# Patient Record
Sex: FEMALE | Race: OTHER | NOT HISPANIC OR LATINO
[De-identification: names, ages, dates, MRNs, and addresses within clinical notes are randomized per-mention and may not be internally consistent; named-entity substitution may affect disease eponyms.]

---

## 2018-10-16 ENCOUNTER — TRANSCRIPTION ENCOUNTER (OUTPATIENT)
Age: 33
End: 2018-10-16

## 2018-11-26 ENCOUNTER — EMERGENCY (EMERGENCY)
Facility: HOSPITAL | Age: 33
LOS: 0 days | Discharge: HOME | End: 2018-11-26

## 2018-11-26 VITALS
TEMPERATURE: 97 F | RESPIRATION RATE: 18 BRPM | HEART RATE: 98 BPM | DIASTOLIC BLOOD PRESSURE: 85 MMHG | OXYGEN SATURATION: 99 % | SYSTOLIC BLOOD PRESSURE: 160 MMHG

## 2018-11-26 DIAGNOSIS — G43.909 MIGRAINE, UNSPECIFIED, NOT INTRACTABLE, WITHOUT STATUS MIGRAINOSUS: ICD-10-CM

## 2018-11-26 DIAGNOSIS — R11.0 NAUSEA: ICD-10-CM

## 2020-08-05 ENCOUNTER — APPOINTMENT (OUTPATIENT)
Dept: BARIATRICS | Facility: CLINIC | Age: 35
End: 2020-08-05
Payer: MEDICARE

## 2020-08-05 VITALS
DIASTOLIC BLOOD PRESSURE: 84 MMHG | BODY MASS INDEX: 45.99 KG/M2 | TEMPERATURE: 97.2 F | HEART RATE: 81 BPM | OXYGEN SATURATION: 100 % | SYSTOLIC BLOOD PRESSURE: 132 MMHG | HEIGHT: 67 IN | WEIGHT: 293 LBS

## 2020-08-05 DIAGNOSIS — M54.5 LOW BACK PAIN: ICD-10-CM

## 2020-08-05 PROCEDURE — 99203 OFFICE O/P NEW LOW 30 MIN: CPT

## 2020-08-05 NOTE — HISTORY OF PRESENT ILLNESS
[de-identified] : 36 y/o F with super morbid obesity, HTN, insulin dependent type II diabetes with A1C running approximately 7. She is on permanent disability following surgeries of right lower extremity, but she is able to ambulate and ride stationary bike for 30 minutes. She works part time as  and has a bachelor's degree. She is generally upbeat, and states that she has the resources to purchase vitamins, which I explained she will require for the rest of her life. She is s/p laparoscopic cholecystectomy as well as foot surgery. She is interested in gastric bypass or MDS, and I explained the difference. With her super morbid obesity, I think this is a reasonable choice if she conforms to postoperative vitamins and diet. My one concern is her chronic anemia, but she does not know her levels. I ordered preoperative labs.

## 2020-08-05 NOTE — END OF VISIT
[FreeTextEntry3] : All medical record entries made by the Scribe were at my, Dr. Jay Woods, direction and personally dictated by me on 08/05/2020. I have reviewed the chart and agree that the record accurately reflects my personal performance of the history, physical exam, assessment and plan. I have also personally directed, reviewed, and agreed with the chart.

## 2020-08-05 NOTE — PHYSICAL EXAM
[Obese] : obese [Normal] : affect appropriate [de-identified] : central obesity, affect appropriate

## 2020-08-05 NOTE — ASSESSMENT
[FreeTextEntry1] : super morbidly obese\par probable MDS\par suggest hematology consult, because of anemia\par needs UGI series and lab work\par

## 2020-08-13 VITALS — BODY MASS INDEX: 45.99 KG/M2 | WEIGHT: 293 LBS | HEIGHT: 67 IN

## 2020-08-13 DIAGNOSIS — M79.89 OTHER SPECIFIED SOFT TISSUE DISORDERS: ICD-10-CM

## 2020-08-13 DIAGNOSIS — M25.473 EFFUSION, UNSPECIFIED ANKLE: ICD-10-CM

## 2020-09-03 ENCOUNTER — APPOINTMENT (OUTPATIENT)
Dept: BARIATRICS | Facility: CLINIC | Age: 35
End: 2020-09-03
Payer: MEDICARE

## 2020-09-03 VITALS — BODY MASS INDEX: 45.99 KG/M2 | WEIGHT: 293 LBS | HEIGHT: 67 IN

## 2020-09-03 PROCEDURE — 97802 MEDICAL NUTRITION INDIV IN: CPT

## 2020-09-25 ENCOUNTER — APPOINTMENT (OUTPATIENT)
Dept: RADIOLOGY | Facility: HOSPITAL | Age: 35
End: 2020-09-25
Payer: MEDICARE

## 2020-09-25 ENCOUNTER — RESULT REVIEW (OUTPATIENT)
Age: 35
End: 2020-09-25

## 2020-09-25 ENCOUNTER — OUTPATIENT (OUTPATIENT)
Dept: OUTPATIENT SERVICES | Facility: HOSPITAL | Age: 35
LOS: 1 days | End: 2020-09-25
Payer: MEDICARE

## 2020-09-25 PROCEDURE — 74240 X-RAY XM UPR GI TRC 1CNTRST: CPT | Mod: 26

## 2020-09-25 PROCEDURE — 74240 X-RAY XM UPR GI TRC 1CNTRST: CPT

## 2020-09-26 LAB
25(OH)D3 SERPL-MCNC: 24.3 NG/ML
ALBUMIN SERPL ELPH-MCNC: 4.2 G/DL
ALP BLD-CCNC: 90 U/L
ALT SERPL-CCNC: 22 U/L
ANION GAP SERPL CALC-SCNC: 16 MMOL/L
AST SERPL-CCNC: 15 U/L
BASOPHILS # BLD AUTO: 0.03 K/UL
BASOPHILS NFR BLD AUTO: 0.3 %
BILIRUB SERPL-MCNC: 0.2 MG/DL
BUN SERPL-MCNC: 18 MG/DL
CALCIUM SERPL-MCNC: 9.4 MG/DL
CHLORIDE SERPL-SCNC: 102 MMOL/L
CO2 SERPL-SCNC: 23 MMOL/L
CREAT SERPL-MCNC: 0.92 MG/DL
CRP SERPL HS-MCNC: 6.35 MG/L
EOSINOPHIL # BLD AUTO: 0.13 K/UL
EOSINOPHIL NFR BLD AUTO: 1.3 %
ESTIMATED AVERAGE GLUCOSE: 192 MG/DL
FOLATE SERPL-MCNC: 13.9 NG/ML
GLUCOSE BS SERPL-MCNC: 152 MG/DL
GLUCOSE SERPL-MCNC: 154 MG/DL
HBA1C MFR BLD HPLC: 8.3 %
HCT VFR BLD CALC: 33.8 %
HGB BLD-MCNC: 10.1 G/DL
IMM GRANULOCYTES NFR BLD AUTO: 0.2 %
INSULIN SERPL-MCNC: 5.3 UU/ML
IRON SERPL-MCNC: 32 UG/DL
LYMPHOCYTES # BLD AUTO: 2.34 K/UL
LYMPHOCYTES NFR BLD AUTO: 23.6 %
MAN DIFF?: NORMAL
MCHC RBC-ENTMCNC: 28.5 PG
MCHC RBC-ENTMCNC: 29.9 GM/DL
MCV RBC AUTO: 95.5 FL
MONOCYTES # BLD AUTO: 0.54 K/UL
MONOCYTES NFR BLD AUTO: 5.5 %
NEUTROPHILS # BLD AUTO: 6.84 K/UL
NEUTROPHILS NFR BLD AUTO: 69.1 %
PLATELET # BLD AUTO: 243 K/UL
POTASSIUM SERPL-SCNC: 4.6 MMOL/L
PROT SERPL-MCNC: 6.8 G/DL
RBC # BLD: 3.54 M/UL
RBC # FLD: 14.9 %
SODIUM SERPL-SCNC: 141 MMOL/L
TSH SERPL-ACNC: 1.31 UIU/ML
VIT B12 SERPL-MCNC: 635 PG/ML
WBC # FLD AUTO: 9.9 K/UL

## 2020-09-30 LAB
VIT A SERPL-MCNC: 42.3 UG/DL
VIT B1 SERPL-MCNC: 128.4 NMOL/L

## 2020-10-02 ENCOUNTER — APPOINTMENT (OUTPATIENT)
Dept: BARIATRICS | Facility: CLINIC | Age: 35
End: 2020-10-02
Payer: MEDICARE

## 2020-10-02 VITALS — HEIGHT: 67 IN

## 2020-10-02 DIAGNOSIS — J45.909 UNSPECIFIED ASTHMA, UNCOMPLICATED: ICD-10-CM

## 2020-10-02 DIAGNOSIS — M19.90 UNSPECIFIED OSTEOARTHRITIS, UNSPECIFIED SITE: ICD-10-CM

## 2020-10-02 PROCEDURE — 97803 MED NUTRITION INDIV SUBSEQ: CPT

## 2020-10-22 ENCOUNTER — APPOINTMENT (OUTPATIENT)
Dept: BARIATRICS | Facility: CLINIC | Age: 35
End: 2020-10-22

## 2020-10-26 ENCOUNTER — APPOINTMENT (OUTPATIENT)
Dept: HEMATOLOGY ONCOLOGY | Facility: CLINIC | Age: 35
End: 2020-10-26
Payer: MEDICARE

## 2020-10-26 VITALS
BODY MASS INDEX: 45.99 KG/M2 | WEIGHT: 293 LBS | HEIGHT: 67 IN | DIASTOLIC BLOOD PRESSURE: 95 MMHG | SYSTOLIC BLOOD PRESSURE: 163 MMHG | OXYGEN SATURATION: 99 % | HEART RATE: 85 BPM | TEMPERATURE: 97.3 F

## 2020-10-26 VITALS — SYSTOLIC BLOOD PRESSURE: 164 MMHG | DIASTOLIC BLOOD PRESSURE: 98 MMHG

## 2020-10-26 PROCEDURE — 99204 OFFICE O/P NEW MOD 45 MIN: CPT | Mod: 25

## 2020-10-26 PROCEDURE — 36415 COLL VENOUS BLD VENIPUNCTURE: CPT

## 2020-10-26 RX ORDER — ALBUTEROL 90 MCG
90 AEROSOL (GRAM) INHALATION
Refills: 0 | Status: ACTIVE | COMMUNITY

## 2020-10-26 RX ORDER — GLUC/MSM/COLGN2/HYAL/ANTIARTH3 375-375-20
TABLET ORAL
Refills: 0 | Status: ACTIVE | COMMUNITY

## 2020-10-26 RX ORDER — IBUPROFEN 800 MG
TABLET ORAL
Refills: 0 | Status: ACTIVE | COMMUNITY

## 2020-10-26 RX ORDER — INSULIN GLARGINE 100 [IU]/ML
INJECTION, SOLUTION SUBCUTANEOUS
Refills: 0 | Status: ACTIVE | COMMUNITY

## 2020-10-26 RX ORDER — INSULIN LISPRO 100 [IU]/ML
INJECTION, SOLUTION INTRAVENOUS; SUBCUTANEOUS
Refills: 0 | Status: ACTIVE | COMMUNITY

## 2020-10-27 LAB
BASOPHILS # BLD AUTO: 0.04 K/UL
BASOPHILS NFR BLD AUTO: 0.3 %
EOSINOPHIL # BLD AUTO: 0.02 K/UL
EOSINOPHIL NFR BLD AUTO: 0.2 %
ERYTHROCYTE [SEDIMENTATION RATE] IN BLOOD BY WESTERGREN METHOD: 46 MM/HR
FERRITIN SERPL-MCNC: 19 NG/ML
HAPTOGLOB SERPL-MCNC: 141 MG/DL
HCT VFR BLD CALC: 34.7 %
HGB BLD-MCNC: 10.8 G/DL
IMM GRANULOCYTES NFR BLD AUTO: 0.4 %
IRON SATN MFR SERPL: 12 %
IRON SERPL-MCNC: 30 UG/DL
LDH SERPL-CCNC: 287 U/L
LYMPHOCYTES # BLD AUTO: 2.64 K/UL
LYMPHOCYTES NFR BLD AUTO: 21.9 %
MAN DIFF?: NORMAL
MCHC RBC-ENTMCNC: 29.6 PG
MCHC RBC-ENTMCNC: 31.1 GM/DL
MCV RBC AUTO: 95.1 FL
MONOCYTES # BLD AUTO: 0.68 K/UL
MONOCYTES NFR BLD AUTO: 5.6 %
NEUTROPHILS # BLD AUTO: 8.62 K/UL
NEUTROPHILS NFR BLD AUTO: 71.6 %
PLATELET # BLD AUTO: 284 K/UL
RBC # BLD: 3.65 M/UL
RBC # FLD: 15.1 %
TIBC SERPL-MCNC: 254 UG/DL
UIBC SERPL-MCNC: 224 UG/DL
WBC # FLD AUTO: 12.05 K/UL

## 2020-10-30 ENCOUNTER — APPOINTMENT (OUTPATIENT)
Age: 35
End: 2020-10-30

## 2020-10-30 ENCOUNTER — OUTPATIENT (OUTPATIENT)
Dept: OUTPATIENT SERVICES | Facility: HOSPITAL | Age: 35
LOS: 1 days | End: 2020-10-30
Payer: MEDICARE

## 2020-10-30 VITALS
OXYGEN SATURATION: 98 % | SYSTOLIC BLOOD PRESSURE: 146 MMHG | DIASTOLIC BLOOD PRESSURE: 84 MMHG | HEART RATE: 84 BPM | TEMPERATURE: 98 F | RESPIRATION RATE: 17 BRPM

## 2020-10-30 VITALS
WEIGHT: 293 LBS | SYSTOLIC BLOOD PRESSURE: 144 MMHG | HEART RATE: 88 BPM | DIASTOLIC BLOOD PRESSURE: 86 MMHG | TEMPERATURE: 97 F | OXYGEN SATURATION: 99 % | RESPIRATION RATE: 18 BRPM | HEIGHT: 68 IN

## 2020-10-30 DIAGNOSIS — D50.9 IRON DEFICIENCY ANEMIA, UNSPECIFIED: ICD-10-CM

## 2020-10-30 PROCEDURE — 96365 THER/PROPH/DIAG IV INF INIT: CPT

## 2020-10-30 RX ORDER — FERUMOXYTOL 510 MG/17ML
510 INJECTION INTRAVENOUS ONCE
Refills: 0 | Status: COMPLETED | OUTPATIENT
Start: 2020-10-30 | End: 2020-10-30

## 2020-10-30 RX ADMIN — FERUMOXYTOL 117 MILLIGRAM(S): 510 INJECTION INTRAVENOUS at 11:55

## 2020-10-30 RX ADMIN — FERUMOXYTOL 510 MILLIGRAM(S): 510 INJECTION INTRAVENOUS at 12:55

## 2020-11-04 ENCOUNTER — OUTPATIENT (OUTPATIENT)
Dept: OUTPATIENT SERVICES | Facility: HOSPITAL | Age: 35
LOS: 1 days | End: 2020-11-04
Payer: MEDICARE

## 2020-11-04 ENCOUNTER — APPOINTMENT (OUTPATIENT)
Age: 35
End: 2020-11-04

## 2020-11-04 VITALS
OXYGEN SATURATION: 99 % | SYSTOLIC BLOOD PRESSURE: 141 MMHG | RESPIRATION RATE: 18 BRPM | HEART RATE: 76 BPM | DIASTOLIC BLOOD PRESSURE: 88 MMHG | TEMPERATURE: 99 F

## 2020-11-04 VITALS
OXYGEN SATURATION: 99 % | RESPIRATION RATE: 18 BRPM | TEMPERATURE: 99 F | SYSTOLIC BLOOD PRESSURE: 149 MMHG | DIASTOLIC BLOOD PRESSURE: 83 MMHG | HEART RATE: 81 BPM | HEIGHT: 68 IN | WEIGHT: 293 LBS

## 2020-11-04 DIAGNOSIS — D50.9 IRON DEFICIENCY ANEMIA, UNSPECIFIED: ICD-10-CM

## 2020-11-04 PROCEDURE — 96365 THER/PROPH/DIAG IV INF INIT: CPT

## 2020-11-04 RX ORDER — FERUMOXYTOL 510 MG/17ML
510 INJECTION INTRAVENOUS ONCE
Refills: 0 | Status: COMPLETED | OUTPATIENT
Start: 2020-11-04 | End: 2020-11-04

## 2020-11-04 RX ADMIN — FERUMOXYTOL 510 MILLIGRAM(S): 510 INJECTION INTRAVENOUS at 12:29

## 2020-11-04 RX ADMIN — FERUMOXYTOL 117 MILLIGRAM(S): 510 INJECTION INTRAVENOUS at 11:47

## 2020-12-23 ENCOUNTER — APPOINTMENT (OUTPATIENT)
Dept: BARIATRICS | Facility: CLINIC | Age: 35
End: 2020-12-23
Payer: MEDICARE

## 2020-12-23 PROCEDURE — 97803 MED NUTRITION INDIV SUBSEQ: CPT

## 2021-01-12 ENCOUNTER — APPOINTMENT (OUTPATIENT)
Dept: BARIATRICS | Facility: CLINIC | Age: 36
End: 2021-01-12
Payer: MEDICARE

## 2021-01-12 PROCEDURE — 97803 MED NUTRITION INDIV SUBSEQ: CPT

## 2021-01-14 ENCOUNTER — APPOINTMENT (OUTPATIENT)
Dept: HEMATOLOGY ONCOLOGY | Facility: CLINIC | Age: 36
End: 2021-01-14
Payer: MEDICARE

## 2021-01-14 VITALS
OXYGEN SATURATION: 95 % | HEIGHT: 68 IN | TEMPERATURE: 97.2 F | BODY MASS INDEX: 44.41 KG/M2 | DIASTOLIC BLOOD PRESSURE: 92 MMHG | WEIGHT: 293 LBS | HEART RATE: 85 BPM | SYSTOLIC BLOOD PRESSURE: 160 MMHG

## 2021-01-14 PROCEDURE — 36415 COLL VENOUS BLD VENIPUNCTURE: CPT

## 2021-01-14 PROCEDURE — 99214 OFFICE O/P EST MOD 30 MIN: CPT | Mod: 25

## 2021-01-14 NOTE — CONSULT LETTER
[Consult Letter:] : I had the pleasure of evaluating your patient, [unfilled]. [Please see my note below.] : Please see my note below. [Consult Closing:] : Thank you very much for allowing me to participate in the care of this patient.  If you have any questions, please do not hesitate to contact me. [Sincerely,] : Sincerely, [Dear  ___] : Dear  [unfilled], [DrJami  ___] : Dr. ABRAHAM [FreeTextEntry3] : Chaya Alanis MD\par

## 2021-01-14 NOTE — HISTORY OF PRESENT ILLNESS
[de-identified] : 35 years old, going for weight reduction surgery was found to have anemia and vitamin D deficiency...

## 2021-01-15 LAB
25(OH)D3 SERPL-MCNC: 26.8 NG/ML
APTT BLD: 35.2 SEC
BASOPHILS # BLD AUTO: 0.04 K/UL
BASOPHILS NFR BLD AUTO: 0.4 %
EOSINOPHIL # BLD AUTO: 0.16 K/UL
EOSINOPHIL NFR BLD AUTO: 1.5 %
ERYTHROCYTE [SEDIMENTATION RATE] IN BLOOD BY WESTERGREN METHOD: 25 MM/HR
ESTIMATED AVERAGE GLUCOSE: 157 MG/DL
FERRITIN SERPL-MCNC: 80 NG/ML
HAPTOGLOB SERPL-MCNC: 131 MG/DL
HBA1C MFR BLD HPLC: 7.1 %
HCT VFR BLD CALC: 36.1 %
HGB BLD-MCNC: 11.2 G/DL
IMM GRANULOCYTES NFR BLD AUTO: 0.3 %
INR PPP: 0.96 RATIO
IRON SATN MFR SERPL: 19 %
IRON SERPL-MCNC: 38 UG/DL
LDH SERPL-CCNC: 214 U/L
LYMPHOCYTES # BLD AUTO: 3.39 K/UL
LYMPHOCYTES NFR BLD AUTO: 32.5 %
MAN DIFF?: NORMAL
MCHC RBC-ENTMCNC: 30.4 PG
MCHC RBC-ENTMCNC: 31 GM/DL
MCV RBC AUTO: 98.1 FL
MONOCYTES # BLD AUTO: 0.61 K/UL
MONOCYTES NFR BLD AUTO: 5.8 %
NEUTROPHILS # BLD AUTO: 6.2 K/UL
NEUTROPHILS NFR BLD AUTO: 59.5 %
PLATELET # BLD AUTO: 253 K/UL
PT BLD: 11.4 SEC
RBC # BLD: 3.68 M/UL
RBC # FLD: 15.1 %
TIBC SERPL-MCNC: 198 UG/DL
TSH SERPL-ACNC: 1.31 UIU/ML
UIBC SERPL-MCNC: 161 UG/DL
VIT B12 SERPL-MCNC: 622 PG/ML
WBC # FLD AUTO: 10.43 K/UL

## 2021-01-15 NOTE — ASSESSMENT
[FreeTextEntry1] : Repeat blood work patient's hemoglobin improved, her diabetes is better controlled and she is optimized for her surgery.\par \par Patient is hematologically cleared for procedure indicated.

## 2021-01-15 NOTE — CONSULT LETTER
[Dear  ___] : Dear  [unfilled], [Consult Letter:] : I had the pleasure of evaluating your patient, [unfilled]. [Please see my note below.] : Please see my note below. [Consult Closing:] : Thank you very much for allowing me to participate in the care of this patient.  If you have any questions, please do not hesitate to contact me. [Sincerely,] : Sincerely, [DrJami  ___] : Dr. ABRAHAM [FreeTextEntry3] : Chaya Alanis MD\par

## 2021-01-15 NOTE — HISTORY OF PRESENT ILLNESS
[de-identified] : 35 years old, going for weight reduction surgery was found to have anemia and vitamin D deficiency... [de-identified] : January 14, 2021 patient is here for follow-up had IV iron x2

## 2021-01-21 ENCOUNTER — TRANSCRIPTION ENCOUNTER (OUTPATIENT)
Age: 36
End: 2021-01-21

## 2021-01-22 ENCOUNTER — OUTPATIENT (OUTPATIENT)
Dept: OUTPATIENT SERVICES | Facility: HOSPITAL | Age: 36
LOS: 1 days | End: 2021-01-22
Payer: MEDICARE

## 2021-01-22 DIAGNOSIS — Z01.818 ENCOUNTER FOR OTHER PREPROCEDURAL EXAMINATION: ICD-10-CM

## 2021-01-22 LAB
ALBUMIN SERPL ELPH-MCNC: 4.2 G/DL — SIGNIFICANT CHANGE UP (ref 3.3–5)
ALP SERPL-CCNC: 98 U/L — SIGNIFICANT CHANGE UP (ref 40–120)
ALT FLD-CCNC: 17 U/L — SIGNIFICANT CHANGE UP (ref 10–45)
ANION GAP SERPL CALC-SCNC: 9 MMOL/L — SIGNIFICANT CHANGE UP (ref 5–17)
APPEARANCE UR: CLEAR — SIGNIFICANT CHANGE UP
AST SERPL-CCNC: 14 U/L — SIGNIFICANT CHANGE UP (ref 10–40)
BACTERIA # UR AUTO: PRESENT /HPF
BILIRUB SERPL-MCNC: 0.2 MG/DL — SIGNIFICANT CHANGE UP (ref 0.2–1.2)
BILIRUB UR-MCNC: NEGATIVE — SIGNIFICANT CHANGE UP
BUN SERPL-MCNC: 15 MG/DL — SIGNIFICANT CHANGE UP (ref 7–23)
CALCIUM SERPL-MCNC: 9.9 MG/DL — SIGNIFICANT CHANGE UP (ref 8.4–10.5)
CHLORIDE SERPL-SCNC: 98 MMOL/L — SIGNIFICANT CHANGE UP (ref 96–108)
CO2 SERPL-SCNC: 30 MMOL/L — SIGNIFICANT CHANGE UP (ref 22–31)
COLOR SPEC: YELLOW — SIGNIFICANT CHANGE UP
CREAT SERPL-MCNC: 0.85 MG/DL — SIGNIFICANT CHANGE UP (ref 0.5–1.3)
DIFF PNL FLD: NEGATIVE — SIGNIFICANT CHANGE UP
EPI CELLS # UR: SIGNIFICANT CHANGE UP /HPF (ref 0–5)
GLUCOSE SERPL-MCNC: 310 MG/DL — HIGH (ref 70–99)
GLUCOSE UR QL: >=1000
KETONES UR-MCNC: NEGATIVE — SIGNIFICANT CHANGE UP
LEUKOCYTE ESTERASE UR-ACNC: NEGATIVE — SIGNIFICANT CHANGE UP
NITRITE UR-MCNC: NEGATIVE — SIGNIFICANT CHANGE UP
PH UR: 7 — SIGNIFICANT CHANGE UP (ref 5–8)
POTASSIUM SERPL-MCNC: 4.9 MMOL/L — SIGNIFICANT CHANGE UP (ref 3.5–5.3)
POTASSIUM SERPL-SCNC: 4.9 MMOL/L — SIGNIFICANT CHANGE UP (ref 3.5–5.3)
PROT SERPL-MCNC: 7 G/DL — SIGNIFICANT CHANGE UP (ref 6–8.3)
PROT UR-MCNC: ABNORMAL MG/DL
RBC CASTS # UR COMP ASSIST: < 5 /HPF — SIGNIFICANT CHANGE UP
SODIUM SERPL-SCNC: 137 MMOL/L — SIGNIFICANT CHANGE UP (ref 135–145)
SP GR SPEC: 1.01 — SIGNIFICANT CHANGE UP (ref 1–1.03)
UROBILINOGEN FLD QL: 0.2 E.U./DL — SIGNIFICANT CHANGE UP
WBC UR QL: < 5 /HPF — SIGNIFICANT CHANGE UP

## 2021-01-22 PROCEDURE — 81001 URINALYSIS AUTO W/SCOPE: CPT

## 2021-01-22 PROCEDURE — 87086 URINE CULTURE/COLONY COUNT: CPT

## 2021-01-22 PROCEDURE — 93005 ELECTROCARDIOGRAM TRACING: CPT

## 2021-01-22 PROCEDURE — 93010 ELECTROCARDIOGRAM REPORT: CPT

## 2021-01-22 PROCEDURE — 80053 COMPREHEN METABOLIC PANEL: CPT

## 2021-01-23 LAB
CULTURE RESULTS: NO GROWTH — SIGNIFICANT CHANGE UP
SPECIMEN SOURCE: SIGNIFICANT CHANGE UP

## 2021-02-01 ENCOUNTER — OUTPATIENT (OUTPATIENT)
Dept: OUTPATIENT SERVICES | Facility: HOSPITAL | Age: 36
LOS: 1 days | End: 2021-02-01
Payer: MEDICARE

## 2021-02-03 ENCOUNTER — APPOINTMENT (OUTPATIENT)
Dept: BARIATRICS | Facility: CLINIC | Age: 36
End: 2021-02-03
Payer: MEDICARE

## 2021-02-03 VITALS — WEIGHT: 293 LBS | HEIGHT: 68 IN | BODY MASS INDEX: 44.41 KG/M2

## 2021-02-03 PROCEDURE — 99215 OFFICE O/P EST HI 40 MIN: CPT

## 2021-02-03 NOTE — END OF VISIT
[FreeTextEntry3] : All medical record entries made by the Scribe were at my, Dr. Woods's, discretion and personally dictated by me on 02/03/2021. I have reviewed the chart and agree that the record accurately reflects my personal performance of the history, physical exam, assessment and plan. I have also personally directed, reviewed and agreed to the chart.

## 2021-02-03 NOTE — ASSESSMENT
[FreeTextEntry1] : Discussed that patient will be scheduled for next Monday as modified DS but first will run bowel. If has considerable adhesions in this area or gross endometriosis which complicates bowel mobility, would only do sleeve gastrectomy and then reassess, as certainly endometriosis is related to female sex hormones, which is altered by weight loss. Pt understands this. Gave all instructions for surgery. Answered all questions. Between review of her previous history and results, believe that this is complex decision making and thus, she has been scheduled for surgery on Monday.

## 2021-02-03 NOTE — ADDENDUM
[FreeTextEntry1] : This note was written by Bianca Almanzar on 02/03/2021 acting as scribe for Dr. Woods.

## 2021-02-03 NOTE — HISTORY OF PRESENT ILLNESS
[Home] : at home, [unfilled] , at the time of the visit. [Medical Office: ([facility])___] : at the medical office located in  [Verbal consent obtained from patient] : the patient, [unfilled] [de-identified] : Jerri Arredondo returns to see us for combination of morbid obesity and DM. We reviewed all of her blood work and history. Hemoglobin A1C has come down from 8.3 to 7.1. Her fasting insulin is very low, which makes me believe that she has components of Type III or Type IV DM, meaning that her production has gone down and she is insulin resistant to both endogenous and exogenous insulin. She is scheduled for modified DS. Discussed all instructions. Of note, in history is that pt had an appendectomy 3 months ago and was also found to have endometriosis.

## 2021-02-05 ENCOUNTER — LABORATORY RESULT (OUTPATIENT)
Age: 36
End: 2021-02-05

## 2021-02-05 VITALS
HEIGHT: 68 IN | RESPIRATION RATE: 18 BRPM | DIASTOLIC BLOOD PRESSURE: 89 MMHG | TEMPERATURE: 98 F | SYSTOLIC BLOOD PRESSURE: 152 MMHG | HEART RATE: 86 BPM | WEIGHT: 293 LBS | OXYGEN SATURATION: 97 %

## 2021-02-05 RX ORDER — ENOXAPARIN SODIUM 100 MG/ML
35 INJECTION SUBCUTANEOUS
Qty: 0 | Refills: 0 | DISCHARGE

## 2021-02-05 NOTE — PRE-OP CHECKLIST - SELECT TESTS ORDERED
ugi, heme clearance/CBC/CMP/PT/PTT/INR/Urinalysis/EKG/CXR ugi, heme clearance/CBC/CMP/PT/PTT/INR/Type and Screen/Urinalysis/EKG/CXR/POCT Blood Glucose/COVID

## 2021-02-07 ENCOUNTER — TRANSCRIPTION ENCOUNTER (OUTPATIENT)
Age: 36
End: 2021-02-07

## 2021-02-08 ENCOUNTER — RESULT REVIEW (OUTPATIENT)
Age: 36
End: 2021-02-08

## 2021-02-08 ENCOUNTER — INPATIENT (INPATIENT)
Facility: HOSPITAL | Age: 36
LOS: 3 days | Discharge: HOME | DRG: 621 | End: 2021-02-12
Attending: SURGERY | Admitting: SURGERY
Payer: MEDICARE

## 2021-02-08 ENCOUNTER — APPOINTMENT (OUTPATIENT)
Dept: BARIATRICS | Facility: HOSPITAL | Age: 36
End: 2021-02-08

## 2021-02-08 DIAGNOSIS — Z98.890 OTHER SPECIFIED POSTPROCEDURAL STATES: Chronic | ICD-10-CM

## 2021-02-08 DIAGNOSIS — Z90.49 ACQUIRED ABSENCE OF OTHER SPECIFIED PARTS OF DIGESTIVE TRACT: Chronic | ICD-10-CM

## 2021-02-08 LAB
BLD GP AB SCN SERPL QL: NEGATIVE — SIGNIFICANT CHANGE UP
GLUCOSE BLDC GLUCOMTR-MCNC: 194 MG/DL — HIGH (ref 70–99)
GLUCOSE BLDC GLUCOMTR-MCNC: 230 MG/DL — HIGH (ref 70–99)
GLUCOSE BLDC GLUCOMTR-MCNC: 253 MG/DL — HIGH (ref 70–99)
GLUCOSE BLDC GLUCOMTR-MCNC: 94 MG/DL — SIGNIFICANT CHANGE UP (ref 70–99)
HCT VFR BLD CALC: 35.1 % — SIGNIFICANT CHANGE UP (ref 34.5–45)
HGB BLD-MCNC: 11.3 G/DL — LOW (ref 11.5–15.5)
MCHC RBC-ENTMCNC: 29.7 PG — SIGNIFICANT CHANGE UP (ref 27–34)
MCHC RBC-ENTMCNC: 32.2 GM/DL — SIGNIFICANT CHANGE UP (ref 32–36)
MCV RBC AUTO: 92.1 FL — SIGNIFICANT CHANGE UP (ref 80–100)
NRBC # BLD: 0 /100 WBCS — SIGNIFICANT CHANGE UP (ref 0–0)
PLATELET # BLD AUTO: 245 K/UL — SIGNIFICANT CHANGE UP (ref 150–400)
RBC # BLD: 3.81 M/UL — SIGNIFICANT CHANGE UP (ref 3.8–5.2)
RBC # FLD: 14.4 % — SIGNIFICANT CHANGE UP (ref 10.3–14.5)
RH IG SCN BLD-IMP: POSITIVE — SIGNIFICANT CHANGE UP
WBC # BLD: 14.36 K/UL — HIGH (ref 3.8–10.5)
WBC # FLD AUTO: 14.36 K/UL — HIGH (ref 3.8–10.5)

## 2021-02-08 PROCEDURE — 43775 LAP SLEEVE GASTRECTOMY: CPT

## 2021-02-08 PROCEDURE — 88307 TISSUE EXAM BY PATHOLOGIST: CPT | Mod: 26

## 2021-02-08 PROCEDURE — 44238 UNLISTED LAPS PX INTESTINE: CPT

## 2021-02-08 PROCEDURE — 43644 LAP GASTRIC BYPASS/ROUX-EN-Y: CPT

## 2021-02-08 PROCEDURE — 71045 X-RAY EXAM CHEST 1 VIEW: CPT | Mod: 26

## 2021-02-08 RX ORDER — HYDRALAZINE HCL 50 MG
10 TABLET ORAL ONCE
Refills: 0 | Status: COMPLETED | OUTPATIENT
Start: 2021-02-08 | End: 2021-02-08

## 2021-02-08 RX ORDER — ENOXAPARIN SODIUM 100 MG/ML
30 INJECTION SUBCUTANEOUS ONCE
Refills: 0 | Status: COMPLETED | OUTPATIENT
Start: 2021-02-08 | End: 2021-02-08

## 2021-02-08 RX ORDER — DEXTROSE 50 % IN WATER 50 %
25 SYRINGE (ML) INTRAVENOUS ONCE
Refills: 0 | Status: DISCONTINUED | OUTPATIENT
Start: 2021-02-08 | End: 2021-02-12

## 2021-02-08 RX ORDER — PANTOPRAZOLE SODIUM 20 MG/1
40 TABLET, DELAYED RELEASE ORAL DAILY
Refills: 0 | Status: DISCONTINUED | OUTPATIENT
Start: 2021-02-08 | End: 2021-02-08

## 2021-02-08 RX ORDER — KETOROLAC TROMETHAMINE 30 MG/ML
15 SYRINGE (ML) INJECTION EVERY 6 HOURS
Refills: 0 | Status: DISCONTINUED | OUTPATIENT
Start: 2021-02-08 | End: 2021-02-10

## 2021-02-08 RX ORDER — METOCLOPRAMIDE HCL 10 MG
10 TABLET ORAL ONCE
Refills: 0 | Status: COMPLETED | OUTPATIENT
Start: 2021-02-08 | End: 2021-02-08

## 2021-02-08 RX ORDER — SODIUM CHLORIDE 9 MG/ML
1000 INJECTION, SOLUTION INTRAVENOUS
Refills: 0 | Status: DISCONTINUED | OUTPATIENT
Start: 2021-02-08 | End: 2021-02-12

## 2021-02-08 RX ORDER — ENOXAPARIN SODIUM 100 MG/ML
50 INJECTION SUBCUTANEOUS
Qty: 0 | Refills: 0 | DISCHARGE

## 2021-02-08 RX ORDER — PANTOPRAZOLE SODIUM 20 MG/1
40 TABLET, DELAYED RELEASE ORAL DAILY
Refills: 0 | Status: DISCONTINUED | OUTPATIENT
Start: 2021-02-08 | End: 2021-02-12

## 2021-02-08 RX ORDER — DEXTROSE 50 % IN WATER 50 %
12.5 SYRINGE (ML) INTRAVENOUS ONCE
Refills: 0 | Status: DISCONTINUED | OUTPATIENT
Start: 2021-02-08 | End: 2021-02-12

## 2021-02-08 RX ORDER — ALBUTEROL 90 UG/1
2.5 AEROSOL, METERED ORAL ONCE
Refills: 0 | Status: COMPLETED | OUTPATIENT
Start: 2021-02-08 | End: 2021-02-08

## 2021-02-08 RX ORDER — SCOPALAMINE 1 MG/3D
1 PATCH, EXTENDED RELEASE TRANSDERMAL ONCE
Refills: 0 | Status: COMPLETED | OUTPATIENT
Start: 2021-02-08 | End: 2021-02-08

## 2021-02-08 RX ORDER — ONDANSETRON 8 MG/1
4 TABLET, FILM COATED ORAL EVERY 6 HOURS
Refills: 0 | Status: DISCONTINUED | OUTPATIENT
Start: 2021-02-08 | End: 2021-02-12

## 2021-02-08 RX ORDER — DEXTROSE 50 % IN WATER 50 %
15 SYRINGE (ML) INTRAVENOUS ONCE
Refills: 0 | Status: DISCONTINUED | OUTPATIENT
Start: 2021-02-08 | End: 2021-02-12

## 2021-02-08 RX ORDER — FERROUS SULFATE 325(65) MG
1 TABLET ORAL
Qty: 0 | Refills: 0 | DISCHARGE

## 2021-02-08 RX ORDER — ACETAMINOPHEN 500 MG
1000 TABLET ORAL ONCE
Refills: 0 | Status: COMPLETED | OUTPATIENT
Start: 2021-02-08 | End: 2021-02-08

## 2021-02-08 RX ORDER — AMLODIPINE BESYLATE 2.5 MG/1
1 TABLET ORAL
Qty: 0 | Refills: 0 | DISCHARGE

## 2021-02-08 RX ORDER — SODIUM CHLORIDE 9 MG/ML
1000 INJECTION, SOLUTION INTRAVENOUS
Refills: 0 | Status: DISCONTINUED | OUTPATIENT
Start: 2021-02-08 | End: 2021-02-09

## 2021-02-08 RX ORDER — HYDROMORPHONE HYDROCHLORIDE 2 MG/ML
0.5 INJECTION INTRAMUSCULAR; INTRAVENOUS; SUBCUTANEOUS
Refills: 0 | Status: DISCONTINUED | OUTPATIENT
Start: 2021-02-08 | End: 2021-02-08

## 2021-02-08 RX ORDER — ALBUTEROL 90 UG/1
2 AEROSOL, METERED ORAL
Qty: 0 | Refills: 0 | DISCHARGE

## 2021-02-08 RX ORDER — GLUCAGON INJECTION, SOLUTION 0.5 MG/.1ML
1 INJECTION, SOLUTION SUBCUTANEOUS ONCE
Refills: 0 | Status: DISCONTINUED | OUTPATIENT
Start: 2021-02-08 | End: 2021-02-12

## 2021-02-08 RX ORDER — GABAPENTIN 400 MG/1
300 CAPSULE ORAL ONCE
Refills: 0 | Status: COMPLETED | OUTPATIENT
Start: 2021-02-08 | End: 2021-02-08

## 2021-02-08 RX ORDER — LABETALOL HCL 100 MG
10 TABLET ORAL ONCE
Refills: 0 | Status: COMPLETED | OUTPATIENT
Start: 2021-02-08 | End: 2021-02-08

## 2021-02-08 RX ORDER — INSULIN LISPRO 100/ML
VIAL (ML) SUBCUTANEOUS
Refills: 0 | Status: DISCONTINUED | OUTPATIENT
Start: 2021-02-08 | End: 2021-02-12

## 2021-02-08 RX ORDER — HYDROMORPHONE HYDROCHLORIDE 2 MG/ML
0.5 INJECTION INTRAMUSCULAR; INTRAVENOUS; SUBCUTANEOUS ONCE
Refills: 0 | Status: DISCONTINUED | OUTPATIENT
Start: 2021-02-08 | End: 2021-02-08

## 2021-02-08 RX ORDER — ACETAMINOPHEN 500 MG
650 TABLET ORAL EVERY 6 HOURS
Refills: 0 | Status: DISCONTINUED | OUTPATIENT
Start: 2021-02-08 | End: 2021-02-11

## 2021-02-08 RX ORDER — PANTOPRAZOLE SODIUM 20 MG/1
40 TABLET, DELAYED RELEASE ORAL
Refills: 0 | Status: DISCONTINUED | OUTPATIENT
Start: 2021-02-08 | End: 2021-02-08

## 2021-02-08 RX ORDER — INSULIN LISPRO 100/ML
15 VIAL (ML) SUBCUTANEOUS
Qty: 0 | Refills: 0 | DISCHARGE

## 2021-02-08 RX ADMIN — ONDANSETRON 4 MILLIGRAM(S): 8 TABLET, FILM COATED ORAL at 17:15

## 2021-02-08 RX ADMIN — HYDROMORPHONE HYDROCHLORIDE 0.5 MILLIGRAM(S): 2 INJECTION INTRAMUSCULAR; INTRAVENOUS; SUBCUTANEOUS at 12:28

## 2021-02-08 RX ADMIN — SCOPALAMINE 1 PATCH: 1 PATCH, EXTENDED RELEASE TRANSDERMAL at 18:51

## 2021-02-08 RX ADMIN — Medication 15 MILLIGRAM(S): at 23:15

## 2021-02-08 RX ADMIN — SCOPALAMINE 1 PATCH: 1 PATCH, EXTENDED RELEASE TRANSDERMAL at 06:33

## 2021-02-08 RX ADMIN — Medication 400 MILLIGRAM(S): at 20:19

## 2021-02-08 RX ADMIN — Medication 2: at 11:50

## 2021-02-08 RX ADMIN — HYDROMORPHONE HYDROCHLORIDE 0.5 MILLIGRAM(S): 2 INJECTION INTRAMUSCULAR; INTRAVENOUS; SUBCUTANEOUS at 17:21

## 2021-02-08 RX ADMIN — SODIUM CHLORIDE 190 MILLILITER(S): 9 INJECTION, SOLUTION INTRAVENOUS at 17:20

## 2021-02-08 RX ADMIN — Medication 4: at 22:13

## 2021-02-08 RX ADMIN — Medication 10 MILLIGRAM(S): at 11:46

## 2021-02-08 RX ADMIN — PANTOPRAZOLE SODIUM 40 MILLIGRAM(S): 20 TABLET, DELAYED RELEASE ORAL at 12:08

## 2021-02-08 RX ADMIN — Medication 1000 MILLIGRAM(S): at 06:32

## 2021-02-08 RX ADMIN — ALBUTEROL 2.5 MILLIGRAM(S): 90 AEROSOL, METERED ORAL at 07:18

## 2021-02-08 RX ADMIN — Medication 10 MILLIGRAM(S): at 12:38

## 2021-02-08 RX ADMIN — ONDANSETRON 4 MILLIGRAM(S): 8 TABLET, FILM COATED ORAL at 23:18

## 2021-02-08 RX ADMIN — GABAPENTIN 300 MILLIGRAM(S): 400 CAPSULE ORAL at 06:32

## 2021-02-08 RX ADMIN — SODIUM CHLORIDE 190 MILLILITER(S): 9 INJECTION, SOLUTION INTRAVENOUS at 22:13

## 2021-02-08 RX ADMIN — Medication 10 MILLIGRAM(S): at 20:19

## 2021-02-08 RX ADMIN — ENOXAPARIN SODIUM 30 MILLIGRAM(S): 100 INJECTION SUBCUTANEOUS at 06:33

## 2021-02-08 RX ADMIN — Medication 6: at 17:15

## 2021-02-08 NOTE — BRIEF OPERATIVE NOTE - OPERATION/FINDINGS
Stomach divided along Bougie edge using Covidien stapler. Omentum sutured to greater curvature of sleeved stomach. Enterotomies made in duodenal stump & intestine using Harmonic device. Posterior half of anastomosis sutured laparoscopically. NGT passed through pylorus into small bowel then anterior half of anastomosis completed. Methylene blue leak test negative for extravasation. Hemostasis achieved. Fascia at 15mm post site closed. Port sites closed w/ 2-0 Vicryl & 4-0 Monocryl sutures & surgical glue.

## 2021-02-08 NOTE — H&P ADULT - NSICDXPASTSURGICALHX_GEN_ALL_CORE_FT
PAST SURGICAL HISTORY:  H/O foot surgery left    H/O ovarian cystectomy right    History of appendectomy     History of cholecystectomy

## 2021-02-08 NOTE — H&P ADULT - NSICDXPASTMEDICALHX_GEN_ALL_CORE_FT
PAST MEDICAL HISTORY:  Asthma     DM2 (diabetes mellitus, type 2)     Endometriosis     HLD (hyperlipidemia)     HTN (hypertension)     Morbid obesity

## 2021-02-08 NOTE — H&P ADULT - HISTORY OF PRESENT ILLNESS
35F hx MO (BMI 51.1), appendectomy, cholecystectomy, DM, iron deficiency anemia, HTN, presents for mDS.     35F hx MO (BMI 51.1), appendectomy and ovarian cystectomy (Nov 2020), cholecystectomy, DM, iron deficiency anemia, HTN, presents for mDS.

## 2021-02-08 NOTE — H&P ADULT - ASSESSMENT
35F hx MO (BMI 51.1), appendectomy, cholecystectomy, DM, iron deficiency anemia, HTN, presents for mDS.    - proceed to OR 35F hx MO (BMI 51.1), appendectomy and ovarian cystectomy (Nov 2020), cholecystectomy, DM, iron deficiency anemia, HTN, presents for mDS.    - proceed to OR

## 2021-02-08 NOTE — H&P ADULT - NSHPPHYSICALEXAM_GEN_ALL_CORE
Gen: NAD  Pulm: normal resp effort and excursion  Abd: soft, NT/ND  Ext: WWP Gen: NAD  Pulm: normal resp effort and excursion  Abd: soft, NT/ND, previous port sites healing well  Ext: WWP

## 2021-02-09 ENCOUNTER — TRANSCRIPTION ENCOUNTER (OUTPATIENT)
Age: 36
End: 2021-02-09

## 2021-02-09 LAB
A1C WITH ESTIMATED AVERAGE GLUCOSE RESULT: 7 % — HIGH (ref 4–5.6)
ANION GAP SERPL CALC-SCNC: 9 MMOL/L — SIGNIFICANT CHANGE UP (ref 5–17)
BUN SERPL-MCNC: 15 MG/DL — SIGNIFICANT CHANGE UP (ref 7–23)
CALCIUM SERPL-MCNC: 8.9 MG/DL — SIGNIFICANT CHANGE UP (ref 8.4–10.5)
CHLORIDE SERPL-SCNC: 102 MMOL/L — SIGNIFICANT CHANGE UP (ref 96–108)
CO2 SERPL-SCNC: 26 MMOL/L — SIGNIFICANT CHANGE UP (ref 22–31)
CREAT SERPL-MCNC: 0.67 MG/DL — SIGNIFICANT CHANGE UP (ref 0.5–1.3)
ESTIMATED AVERAGE GLUCOSE: 154 MG/DL — HIGH (ref 68–114)
GLUCOSE BLDC GLUCOMTR-MCNC: 198 MG/DL — HIGH (ref 70–99)
GLUCOSE BLDC GLUCOMTR-MCNC: 209 MG/DL — HIGH (ref 70–99)
GLUCOSE BLDC GLUCOMTR-MCNC: 211 MG/DL — HIGH (ref 70–99)
GLUCOSE BLDC GLUCOMTR-MCNC: 239 MG/DL — HIGH (ref 70–99)
GLUCOSE SERPL-MCNC: 207 MG/DL — HIGH (ref 70–99)
HCT VFR BLD CALC: 31 % — LOW (ref 34.5–45)
HGB BLD-MCNC: 10.2 G/DL — LOW (ref 11.5–15.5)
MAGNESIUM SERPL-MCNC: 1.8 MG/DL — SIGNIFICANT CHANGE UP (ref 1.6–2.6)
MCHC RBC-ENTMCNC: 30.6 PG — SIGNIFICANT CHANGE UP (ref 27–34)
MCHC RBC-ENTMCNC: 32.9 GM/DL — SIGNIFICANT CHANGE UP (ref 32–36)
MCV RBC AUTO: 93.1 FL — SIGNIFICANT CHANGE UP (ref 80–100)
NRBC # BLD: 0 /100 WBCS — SIGNIFICANT CHANGE UP (ref 0–0)
PHOSPHATE SERPL-MCNC: 2.6 MG/DL — SIGNIFICANT CHANGE UP (ref 2.5–4.5)
PLATELET # BLD AUTO: 223 K/UL — SIGNIFICANT CHANGE UP (ref 150–400)
POTASSIUM SERPL-MCNC: 3.5 MMOL/L — SIGNIFICANT CHANGE UP (ref 3.5–5.3)
POTASSIUM SERPL-SCNC: 3.5 MMOL/L — SIGNIFICANT CHANGE UP (ref 3.5–5.3)
RBC # BLD: 3.33 M/UL — LOW (ref 3.8–5.2)
RBC # FLD: 14.4 % — SIGNIFICANT CHANGE UP (ref 10.3–14.5)
SODIUM SERPL-SCNC: 137 MMOL/L — SIGNIFICANT CHANGE UP (ref 135–145)
SURGICAL PATHOLOGY STUDY: SIGNIFICANT CHANGE UP
WBC # BLD: 14.08 K/UL — HIGH (ref 3.8–10.5)
WBC # FLD AUTO: 14.08 K/UL — HIGH (ref 3.8–10.5)

## 2021-02-09 RX ORDER — CHLORHEXIDINE GLUCONATE 213 G/1000ML
1 SOLUTION TOPICAL DAILY
Refills: 0 | Status: DISCONTINUED | OUTPATIENT
Start: 2021-02-09 | End: 2021-02-12

## 2021-02-09 RX ORDER — DEXAMETHASONE 0.5 MG/5ML
6 ELIXIR ORAL ONCE
Refills: 0 | Status: COMPLETED | OUTPATIENT
Start: 2021-02-09 | End: 2021-02-09

## 2021-02-09 RX ORDER — MAGNESIUM SULFATE 500 MG/ML
2 VIAL (ML) INJECTION ONCE
Refills: 0 | Status: COMPLETED | OUTPATIENT
Start: 2021-02-09 | End: 2021-02-09

## 2021-02-09 RX ORDER — ACETAMINOPHEN 500 MG
1000 TABLET ORAL ONCE
Refills: 0 | Status: COMPLETED | OUTPATIENT
Start: 2021-02-09 | End: 2021-02-09

## 2021-02-09 RX ORDER — POTASSIUM CHLORIDE 20 MEQ
10 PACKET (EA) ORAL
Refills: 0 | Status: COMPLETED | OUTPATIENT
Start: 2021-02-09 | End: 2021-02-09

## 2021-02-09 RX ORDER — SODIUM CHLORIDE 9 MG/ML
1000 INJECTION, SOLUTION INTRAVENOUS
Refills: 0 | Status: DISCONTINUED | OUTPATIENT
Start: 2021-02-09 | End: 2021-02-11

## 2021-02-09 RX ADMIN — SCOPALAMINE 1 PATCH: 1 PATCH, EXTENDED RELEASE TRANSDERMAL at 06:53

## 2021-02-09 RX ADMIN — Medication 15 MILLIGRAM(S): at 23:07

## 2021-02-09 RX ADMIN — Medication 4: at 07:49

## 2021-02-09 RX ADMIN — Medication 15 MILLIGRAM(S): at 05:23

## 2021-02-09 RX ADMIN — Medication 100 MILLIEQUIVALENT(S): at 11:06

## 2021-02-09 RX ADMIN — Medication 100 MILLIEQUIVALENT(S): at 09:20

## 2021-02-09 RX ADMIN — CHLORHEXIDINE GLUCONATE 1 APPLICATION(S): 213 SOLUTION TOPICAL at 11:19

## 2021-02-09 RX ADMIN — Medication 400 MILLIGRAM(S): at 09:18

## 2021-02-09 RX ADMIN — PANTOPRAZOLE SODIUM 40 MILLIGRAM(S): 20 TABLET, DELAYED RELEASE ORAL at 11:06

## 2021-02-09 RX ADMIN — Medication 50 GRAM(S): at 12:02

## 2021-02-09 RX ADMIN — ONDANSETRON 4 MILLIGRAM(S): 8 TABLET, FILM COATED ORAL at 11:06

## 2021-02-09 RX ADMIN — Medication 15 MILLIGRAM(S): at 11:06

## 2021-02-09 RX ADMIN — Medication 6 MILLIGRAM(S): at 09:57

## 2021-02-09 RX ADMIN — Medication 15 MILLIGRAM(S): at 16:07

## 2021-02-09 RX ADMIN — ONDANSETRON 4 MILLIGRAM(S): 8 TABLET, FILM COATED ORAL at 05:23

## 2021-02-09 RX ADMIN — ONDANSETRON 4 MILLIGRAM(S): 8 TABLET, FILM COATED ORAL at 19:48

## 2021-02-09 RX ADMIN — Medication 400 MILLIGRAM(S): at 21:36

## 2021-02-09 RX ADMIN — Medication 100 MILLIEQUIVALENT(S): at 07:59

## 2021-02-09 RX ADMIN — Medication 2: at 21:36

## 2021-02-09 RX ADMIN — Medication 4: at 17:23

## 2021-02-09 RX ADMIN — SODIUM CHLORIDE 80 MILLILITER(S): 9 INJECTION, SOLUTION INTRAVENOUS at 19:48

## 2021-02-09 RX ADMIN — Medication 4: at 12:02

## 2021-02-09 RX ADMIN — SCOPALAMINE 1 PATCH: 1 PATCH, EXTENDED RELEASE TRANSDERMAL at 18:47

## 2021-02-09 RX ADMIN — Medication 400 MILLIGRAM(S): at 02:01

## 2021-02-09 NOTE — DISCHARGE NOTE PROVIDER - NSDCFUADDAPPT_GEN_ALL_CORE_FT
Please make a follow up appt with your endocrinologist 1 week from discharge to adjust diabetes medication.  Please make a follow up appt with your primary care physician 1 week from discharge.

## 2021-02-09 NOTE — DIETITIAN INITIAL EVALUATION ADULT. - OTHER INFO
35F hx MO (BMI 51.1), appendectomy and ovarian cystectomy (Nov 2020), cholecystectomy, DM, iron deficiency anemia, HTN, admitting for elective lap sleeve gastrectomy (2/8), now POD #1.     On assessment, pt OOB in chair verbalizing "intense" stomach pain- RN made aware. Currently on BARICLLIQ diet, tolerating PO. Pt consumed 1/2 oz water prior to 9AM stating that she was worried she would vomit if she drank more. Reviewed importance of maintaining adequate hydration to prevent complications from dehydration. Pt understanding of importance. Pt endorses nausea- RN made aware ( not ordered for Zofran). Discussed volumes of various cup sizes on tray table and encouraged aiming for 4 oz/hr as tolerated. Prepared with protein shakes and vitamins at home. RD provided in-depth edu on diet advancement and specific nutrient needs s/p DS. Pt allergic to Shellfish- already noted in EMR. No dietary restrictions at home. Pt endorses starting to lose weights prior to surgery (states she lost 18 lbs x 3 months however she is unable to state her UBW prior to wt loss). Reviewed importance of adequate protein intake to maintain lean muscle mass with expected weight loss s/p bariatric surgery. Pt verbalized understanding. Labs: noted elevated POCT- pt w/ hx of DM and ordered for Decadron- see recs below for bs control per team. Skin: surgical incisions. GI: WDL per flowsheet. RD to follow up per protocol. 35F hx MO (BMI 51.1), appendectomy and ovarian cystectomy (Nov 2020), cholecystectomy, DM, iron deficiency anemia, HTN, admitting for elective Duodenal Switch  (2/8), now POD #1.     On assessment, pt OOB in chair verbalizing "intense" stomach pain- RN made aware. Currently on BARICLLIQ diet, tolerating PO. Pt consumed 1/2 oz water prior to 9AM stating that she was worried she would vomit if she drank more. Reviewed importance of maintaining adequate hydration to prevent complications from dehydration. Pt understanding of importance. Pt endorses nausea- RN made aware ( not ordered for Zofran). Discussed volumes of various cup sizes on tray table and encouraged aiming for 4 oz/hr as tolerated. Prepared with protein shakes and vitamins at home. RD provided in-depth edu on diet advancement and specific nutrient needs s/p DS. Pt allergic to Shellfish- already noted in EMR. No dietary restrictions at home. Pt endorses starting to lose weights prior to surgery (states she lost 18 lbs x 3 months however she is unable to state her UBW prior to wt loss). Reviewed importance of adequate protein intake to maintain lean muscle mass with expected weight loss s/p bariatric surgery. Pt verbalized understanding. Labs: noted elevated POCT- pt w/ hx of DM and ordered for Decadron- see recs below for bs control per team. Skin: surgical incisions. GI: WDL per flowsheet. RD to follow up per protocol.

## 2021-02-09 NOTE — DISCHARGE NOTE PROVIDER - NSDCCPCAREPLAN_GEN_ALL_CORE_FT
PRINCIPAL DISCHARGE DIAGNOSIS  Diagnosis: Morbid obesity  Assessment and Plan of Treatment: 35 year old with history of  MO (BMI 51), DM, HTN, iron deficiency anemia, pshx; appendectomy, cholecystectomy and ovarian cystectomy now s/p modified Duodenal Switch. Pt tolerated the procedure well. At time of discharge, pt was tolerating a bariatric clear liquid diet, and pt's pain was controlled. Plan is to follow up with Dr. Woods in the office.  1) Please take Tylenol 650 mg every 4 to 6 hours by mouth for moderate pain control. Please do not exceed over 4,000 mg of Tylenol a day.  2) Please start taking Eliquis 2.5 mg by mouth twice a day starting Thursday February 11, 2021 3 days after surgery .  3) Please take Omeprazole 40 mg once a day by mouth.         PRINCIPAL DISCHARGE DIAGNOSIS  Diagnosis: Morbid obesity  Assessment and Plan of Treatment: 35 year old with history of  MO (BMI 51), DM, HTN, iron deficiency anemia, pshx; appendectomy, cholecystectomy and ovarian cystectomy now s/p modified Duodenal Switch. Pt tolerated the procedure well. At time of discharge, pt was tolerating a bariatric clear liquid diet, and pt's pain was controlled. Plan is to follow up with Dr. Woods in the office.  1) Please take Tylenol 650 mg every 4 to 6 hours by mouth for moderate pain control. Please do not exceed over 4,000 mg of Tylenol a day.  2) Please start taking Eliquis 2.5 mg by mouth twice a day starting Thursday February 12, 2021   3) Please take Omeprazole 40 mg once a day by mouth.

## 2021-02-09 NOTE — DIETITIAN INITIAL EVALUATION ADULT. - OTHER CALCULATIONS
IBW used as pt exceeds 120% IBW (240%). Above energy needs calculated for wt maintenance (20-25kcal/kg).   Weeks 1-2 estimated needs: kcal/day (10-12kcal/kg), g pro/day (1.5-2.1g/kg), >/=64oz clear fluids.

## 2021-02-09 NOTE — DISCHARGE NOTE PROVIDER - CARE PROVIDER_API CALL
Jay Woods (MD)  Surgery  [address] [place] [address], [place] [address]  Phone: [phone]  Fax: [phone]  Follow Up Time: 2 weeks

## 2021-02-09 NOTE — DIETITIAN INITIAL EVALUATION ADULT. - NS FNS REASON FOR WEIGHT CHANG
reported healthy life-style modifications; reported wt loss of 18 lbs x 3 months (pt unable to state UBW)

## 2021-02-09 NOTE — DISCHARGE NOTE PROVIDER - NSDCMRMEDTOKEN_GEN_ALL_CORE_FT
Albuterol (Eqv-Proventil HFA) 90 mcg/inh inhalation aerosol: 2 puff(s) inhaled every 6 hours, As Needed  amLODIPine 10 mg oral tablet: 1 tab(s) orally once a day  ferrous sulfate 325 mg (65 mg elemental iron) oral tablet: 1 tab(s) orally 3 times a day  HumaLOG KwikPen 100 units/mL injectable solution: 15 unit(s) injectable 3 times a day  Lantus Solostar Pen 100 units/mL subcutaneous solution: 50 unit(s) subcutaneous once a day (at bedtime)   Albuterol (Eqv-Proventil HFA) 90 mcg/inh inhalation aerosol: 2 puff(s) inhaled every 6 hours, As Needed  amLODIPine 10 mg oral tablet: 1 tab(s) orally once a day  Eliquis 2.5 mg oral tablet: 1 tab(s) orally every 12 hours   ferrous sulfate 325 mg (65 mg elemental iron) oral tablet: 1 tab(s) orally 3 times a day  HumaLOG KwikPen 100 units/mL injectable solution: 15 unit(s) injectable 3 times a day  Lantus Solostar Pen 100 units/mL subcutaneous solution: 50 unit(s) subcutaneous once a day (at bedtime)  omeprazole 40 mg oral delayed release capsule: 1 cap(s) orally once a day    Albuterol (Eqv-Proventil HFA) 90 mcg/inh inhalation aerosol: 2 puff(s) inhaled every 6 hours, As Needed  amLODIPine 10 mg oral tablet: 1 tab(s) orally once a day  Eliquis 2.5 mg oral tablet: 1 tab(s) orally every 12 hours   ferrous sulfate 325 mg (65 mg elemental iron) oral tablet: 1 tab(s) orally 3 times a day  HumaLOG KwikPen 100 units/mL injectable solution: 15 unit(s) injectable 3 times a day  Lantus Solostar Pen 100 units/mL subcutaneous solution: 50 unit(s) subcutaneous once a day (at bedtime)  omeprazole 40 mg oral delayed release capsule: 1 cap(s) orally once a day   ondansetron 4 mg oral tablet, disintegratin tab(s) orally every 6 hours, As Needed -for nausea

## 2021-02-09 NOTE — DISCHARGE NOTE PROVIDER - HOSPITAL COURSE
35 year old with history of  MO (BMI 51), DM, HTN, iron deficiency anemia, pshx; appendectomy, cholecystectomy and ovarian cystectomy now s/p modified Duodenal Switch.  Pt tolerated the procedure well. At time of discharge, pt was tolerating a bariatric clear liquid diet, and pt's pain was controlled. Plan is to follow up with Dr. Woods in the office.

## 2021-02-09 NOTE — DISCHARGE NOTE PROVIDER - NSDCCPGOAL_GEN_ALL_CORE_FT
To get better and follow your care plan as instructed.  1) Please take Tylenol 650 mg every 4 to 6 hours by mouth for moderate pain control. Please do not exceed over 4,000 mg of Tylenol a day.  2) Please start taking Eliquis 2.5 mg by mouth twice a day starting Thursday February 11, 2021 3 days after surgery .  3) Please take Omeprazole 40 mg once a day by mouth. To get better and follow your care plan as instructed.  1) Please take Tylenol 650 mg every 4 to 6 hours by mouth for moderate pain control. Please do not exceed over 4,000 mg of Tylenol a day.  2) Please start taking Eliquis 2.5 mg by mouth twice a day starting Thursday February 11, 2021 3 days after surgery .  3) Please take Omeprazole 40 mg once a day by mouth.  4) PLEASE FOLLOW UP WITH ENDOCRINOLOGIST TO ADJUST DIABETIC MEDICATION.

## 2021-02-09 NOTE — DIETITIAN INITIAL EVALUATION ADULT. - ADD RECOMMEND
1. BARICLLIQ diet 2. Recommend advance to phase 1 bariatric full liquid diet when medically feasible 3. Encourage adequate hydration with goal of 4oz/hr and/or 64 oz/day 4. Pain regimen and bs control per team

## 2021-02-09 NOTE — DISCHARGE NOTE PROVIDER - NSDCFUADDINST_GEN_ALL_CORE_FT
Follow up with Dr. Woods in 1 week. Call the office at  to schedule your appointment. You may shower; soap and water over incision sites. Do not scrub. Pat dry when done. No tub bathing or swimming until cleared. Keep incision sites out of the sun as scars will darken. No heavy lifting (>10lbs) or strenuous exercise. Diet: Bariatric Full Fluids. 60 grams protein daily.  64 fluid ounces water daily. Drink small sips throughout the day. Continue diet as outlined by paperwork received as a pre-operative patient. You should be urinating at least 3-4x per day. Call the office if you experience increasing abdominal pain, nausea, vomiting, or temperature >100.4F.  NO ASPIRIN OR NSAIDs until approved by Dr. Woods. Avoid alcoholic beverages until cleared by Dr. Woods.    1) Please take Tylenol 650 mg every 4 to 6 hours by mouth for moderate pain control. Please do not exceed over 4,000 mg of Tylenol a day.  2) Please start taking Eliquis 2.5 mg by mouth twice a day starting Thursday February 11, 2021 3 days after surgery .  3) Please take Omeprazole 40 mg once a day by mouth.   Follow up with Dr. Woods in 1 week. Call the office at  to schedule your appointment. You may shower; soap and water over incision sites. Do not scrub. Pat dry when done. No tub bathing or swimming until cleared. Keep incision sites out of the sun as scars will darken. No heavy lifting (>10lbs) or strenuous exercise. Diet: Bariatric Full Fluids. 60 grams protein daily.  64 fluid ounces water daily. Drink small sips throughout the day. Continue diet as outlined by paperwork received as a pre-operative patient. You should be urinating at least 3-4x per day. Call the office if you experience increasing abdominal pain, nausea, vomiting, or temperature >100.4F.  NO ASPIRIN OR NSAIDs until approved by Dr. Woods. Avoid alcoholic beverages until cleared by Dr. Woods.    1) Please take Tylenol 650 mg every 4 to 6 hours by mouth for moderate pain control. Please do not exceed over 4,000 mg of Tylenol a day.  2) Please start taking Eliquis 2.5 mg by mouth twice a day starting Thursday February 11, 2021 3 days after surgery .  3) Please take Omeprazole 40 mg once a day by mouth.  4) PLEASE FOLLOW UP WITH ENDOCRINOLOGIST TO ADJUST DIABETIC MEDICATION.   Follow up with Dr. Woods in 1 week. Call the office at  to schedule your appointment. You may shower; soap and water over incision sites. Do not scrub. Pat dry when done. No tub bathing or swimming until cleared. Keep incision sites out of the sun as scars will darken. No heavy lifting (>10lbs) or strenuous exercise. Diet: Bariatric Full Fluids. 60 grams protein daily.  64 fluid ounces water daily. Drink small sips throughout the day. Continue diet as outlined by paperwork received as a pre-operative patient. You should be urinating at least 3-4x per day. Call the office if you experience increasing abdominal pain, nausea, vomiting, or temperature >100.4F.  NO ASPIRIN OR NSAIDs until approved by Dr. Woods. Avoid alcoholic beverages until cleared by Dr. Woods.    1) Please take Tylenol 650 mg every 4 to 6 hours by mouth for moderate pain control. Please do not exceed over 4,000 mg of Tylenol a day.  2) Please start taking Eliquis 2.5 mg by mouth twice a day starting Friday February 12, 2021.  3) Please take Omeprazole 40 mg once a day by mouth.  4) PLEASE FOLLOW UP WITH ENDOCRINOLOGIST TO ADJUST DIABETIC MEDICATION.

## 2021-02-09 NOTE — DISCHARGE NOTE PROVIDER - NSDCCPTREATMENT_GEN_ALL_CORE_FT
PRINCIPAL PROCEDURE  Procedure: Laparoscopic sleeve gastrectomy with duodenal switch  Findings and Treatment:       SECONDARY PROCEDURE  Procedure: Laparoscopic sleeve gastrectomy with duodenal switch  Findings and Treatment:

## 2021-02-10 LAB
ANION GAP SERPL CALC-SCNC: 12 MMOL/L — SIGNIFICANT CHANGE UP (ref 5–17)
BUN SERPL-MCNC: 17 MG/DL — SIGNIFICANT CHANGE UP (ref 7–23)
CALCIUM SERPL-MCNC: 9 MG/DL — SIGNIFICANT CHANGE UP (ref 8.4–10.5)
CHLORIDE SERPL-SCNC: 101 MMOL/L — SIGNIFICANT CHANGE UP (ref 96–108)
CO2 SERPL-SCNC: 26 MMOL/L — SIGNIFICANT CHANGE UP (ref 22–31)
CREAT SERPL-MCNC: 0.83 MG/DL — SIGNIFICANT CHANGE UP (ref 0.5–1.3)
GLUCOSE BLDC GLUCOMTR-MCNC: 165 MG/DL — HIGH (ref 70–99)
GLUCOSE BLDC GLUCOMTR-MCNC: 188 MG/DL — HIGH (ref 70–99)
GLUCOSE BLDC GLUCOMTR-MCNC: 245 MG/DL — HIGH (ref 70–99)
GLUCOSE SERPL-MCNC: 166 MG/DL — HIGH (ref 70–99)
HCT VFR BLD CALC: 32.9 % — LOW (ref 34.5–45)
HGB BLD-MCNC: 10.5 G/DL — LOW (ref 11.5–15.5)
MAGNESIUM SERPL-MCNC: 1.9 MG/DL — SIGNIFICANT CHANGE UP (ref 1.6–2.6)
MCHC RBC-ENTMCNC: 30.5 PG — SIGNIFICANT CHANGE UP (ref 27–34)
MCHC RBC-ENTMCNC: 31.9 GM/DL — LOW (ref 32–36)
MCV RBC AUTO: 95.6 FL — SIGNIFICANT CHANGE UP (ref 80–100)
NRBC # BLD: 0 /100 WBCS — SIGNIFICANT CHANGE UP (ref 0–0)
PHOSPHATE SERPL-MCNC: 1.8 MG/DL — LOW (ref 2.5–4.5)
PLATELET # BLD AUTO: 246 K/UL — SIGNIFICANT CHANGE UP (ref 150–400)
POTASSIUM SERPL-MCNC: 3.6 MMOL/L — SIGNIFICANT CHANGE UP (ref 3.5–5.3)
POTASSIUM SERPL-SCNC: 3.6 MMOL/L — SIGNIFICANT CHANGE UP (ref 3.5–5.3)
RBC # BLD: 3.44 M/UL — LOW (ref 3.8–5.2)
RBC # FLD: 14.6 % — HIGH (ref 10.3–14.5)
SODIUM SERPL-SCNC: 139 MMOL/L — SIGNIFICANT CHANGE UP (ref 135–145)
WBC # BLD: 13.88 K/UL — HIGH (ref 3.8–10.5)
WBC # FLD AUTO: 13.88 K/UL — HIGH (ref 3.8–10.5)

## 2021-02-10 RX ORDER — MAGNESIUM SULFATE 500 MG/ML
1 VIAL (ML) INJECTION ONCE
Refills: 0 | Status: COMPLETED | OUTPATIENT
Start: 2021-02-10 | End: 2021-02-10

## 2021-02-10 RX ORDER — ACETAMINOPHEN 500 MG
1000 TABLET ORAL ONCE
Refills: 0 | Status: COMPLETED | OUTPATIENT
Start: 2021-02-10 | End: 2021-02-10

## 2021-02-10 RX ORDER — LANOLIN ALCOHOL/MO/W.PET/CERES
5 CREAM (GRAM) TOPICAL AT BEDTIME
Refills: 0 | Status: DISCONTINUED | OUTPATIENT
Start: 2021-02-10 | End: 2021-02-12

## 2021-02-10 RX ORDER — HYDRALAZINE HCL 50 MG
5 TABLET ORAL ONCE
Refills: 0 | Status: COMPLETED | OUTPATIENT
Start: 2021-02-10 | End: 2021-02-10

## 2021-02-10 RX ORDER — FAMOTIDINE 10 MG/ML
20 INJECTION INTRAVENOUS ONCE
Refills: 0 | Status: COMPLETED | OUTPATIENT
Start: 2021-02-10 | End: 2021-02-10

## 2021-02-10 RX ORDER — SODIUM,POTASSIUM PHOSPHATES 278-250MG
1 POWDER IN PACKET (EA) ORAL ONCE
Refills: 0 | Status: COMPLETED | OUTPATIENT
Start: 2021-02-10 | End: 2021-02-10

## 2021-02-10 RX ORDER — POTASSIUM CHLORIDE 20 MEQ
40 PACKET (EA) ORAL ONCE
Refills: 0 | Status: COMPLETED | OUTPATIENT
Start: 2021-02-10 | End: 2021-02-10

## 2021-02-10 RX ORDER — DEXAMETHASONE 0.5 MG/5ML
6 ELIXIR ORAL ONCE
Refills: 0 | Status: COMPLETED | OUTPATIENT
Start: 2021-02-10 | End: 2021-02-10

## 2021-02-10 RX ADMIN — Medication 5 MILLIGRAM(S): at 21:19

## 2021-02-10 RX ADMIN — Medication 5 MILLIGRAM(S): at 21:46

## 2021-02-10 RX ADMIN — Medication 15 MILLIGRAM(S): at 11:32

## 2021-02-10 RX ADMIN — Medication 2: at 07:20

## 2021-02-10 RX ADMIN — Medication 2: at 21:46

## 2021-02-10 RX ADMIN — Medication 100 GRAM(S): at 11:31

## 2021-02-10 RX ADMIN — SCOPALAMINE 1 PATCH: 1 PATCH, EXTENDED RELEASE TRANSDERMAL at 06:01

## 2021-02-10 RX ADMIN — FAMOTIDINE 20 MILLIGRAM(S): 10 INJECTION INTRAVENOUS at 16:53

## 2021-02-10 RX ADMIN — PANTOPRAZOLE SODIUM 40 MILLIGRAM(S): 20 TABLET, DELAYED RELEASE ORAL at 11:32

## 2021-02-10 RX ADMIN — SODIUM CHLORIDE 80 MILLILITER(S): 9 INJECTION, SOLUTION INTRAVENOUS at 20:29

## 2021-02-10 RX ADMIN — ONDANSETRON 4 MILLIGRAM(S): 8 TABLET, FILM COATED ORAL at 06:11

## 2021-02-10 RX ADMIN — Medication 4: at 16:53

## 2021-02-10 RX ADMIN — Medication 1 PACKET(S): at 11:32

## 2021-02-10 RX ADMIN — Medication 40 MILLIEQUIVALENT(S): at 11:32

## 2021-02-10 RX ADMIN — ONDANSETRON 4 MILLIGRAM(S): 8 TABLET, FILM COATED ORAL at 20:29

## 2021-02-10 RX ADMIN — Medication 15 MILLIGRAM(S): at 16:52

## 2021-02-10 RX ADMIN — Medication 400 MILLIGRAM(S): at 08:16

## 2021-02-10 RX ADMIN — Medication 6 MILLIGRAM(S): at 07:21

## 2021-02-10 RX ADMIN — CHLORHEXIDINE GLUCONATE 1 APPLICATION(S): 213 SOLUTION TOPICAL at 11:33

## 2021-02-10 RX ADMIN — Medication 15 MILLIGRAM(S): at 05:32

## 2021-02-10 RX ADMIN — Medication 400 MILLIGRAM(S): at 20:58

## 2021-02-11 LAB
ANION GAP SERPL CALC-SCNC: 9 MMOL/L — SIGNIFICANT CHANGE UP (ref 5–17)
BUN SERPL-MCNC: 18 MG/DL — SIGNIFICANT CHANGE UP (ref 7–23)
CALCIUM SERPL-MCNC: 9.4 MG/DL — SIGNIFICANT CHANGE UP (ref 8.4–10.5)
CHLORIDE SERPL-SCNC: 107 MMOL/L — SIGNIFICANT CHANGE UP (ref 96–108)
CO2 SERPL-SCNC: 27 MMOL/L — SIGNIFICANT CHANGE UP (ref 22–31)
CREAT SERPL-MCNC: 0.76 MG/DL — SIGNIFICANT CHANGE UP (ref 0.5–1.3)
GLUCOSE BLDC GLUCOMTR-MCNC: 153 MG/DL — HIGH (ref 70–99)
GLUCOSE BLDC GLUCOMTR-MCNC: 156 MG/DL — HIGH (ref 70–99)
GLUCOSE BLDC GLUCOMTR-MCNC: 166 MG/DL — HIGH (ref 70–99)
GLUCOSE BLDC GLUCOMTR-MCNC: 168 MG/DL — HIGH (ref 70–99)
GLUCOSE SERPL-MCNC: 156 MG/DL — HIGH (ref 70–99)
HCT VFR BLD CALC: 29.6 % — LOW (ref 34.5–45)
HGB BLD-MCNC: 9.6 G/DL — LOW (ref 11.5–15.5)
MAGNESIUM SERPL-MCNC: 2 MG/DL — SIGNIFICANT CHANGE UP (ref 1.6–2.6)
MCHC RBC-ENTMCNC: 31.1 PG — SIGNIFICANT CHANGE UP (ref 27–34)
MCHC RBC-ENTMCNC: 32.4 GM/DL — SIGNIFICANT CHANGE UP (ref 32–36)
MCV RBC AUTO: 95.8 FL — SIGNIFICANT CHANGE UP (ref 80–100)
NRBC # BLD: 0 /100 WBCS — SIGNIFICANT CHANGE UP (ref 0–0)
PHOSPHATE SERPL-MCNC: 2.3 MG/DL — LOW (ref 2.5–4.5)
PLATELET # BLD AUTO: 221 K/UL — SIGNIFICANT CHANGE UP (ref 150–400)
POTASSIUM SERPL-MCNC: 4 MMOL/L — SIGNIFICANT CHANGE UP (ref 3.5–5.3)
POTASSIUM SERPL-SCNC: 4 MMOL/L — SIGNIFICANT CHANGE UP (ref 3.5–5.3)
RBC # BLD: 3.09 M/UL — LOW (ref 3.8–5.2)
RBC # FLD: 14.6 % — HIGH (ref 10.3–14.5)
SODIUM SERPL-SCNC: 143 MMOL/L — SIGNIFICANT CHANGE UP (ref 135–145)
WBC # BLD: 12.07 K/UL — HIGH (ref 3.8–10.5)
WBC # FLD AUTO: 12.07 K/UL — HIGH (ref 3.8–10.5)

## 2021-02-11 RX ORDER — ACETAMINOPHEN 500 MG
1000 TABLET ORAL ONCE
Refills: 0 | Status: COMPLETED | OUTPATIENT
Start: 2021-02-11 | End: 2021-02-11

## 2021-02-11 RX ORDER — ACETAMINOPHEN 500 MG
650 TABLET ORAL EVERY 6 HOURS
Refills: 0 | Status: DISCONTINUED | OUTPATIENT
Start: 2021-02-11 | End: 2021-02-12

## 2021-02-11 RX ORDER — HYDROMORPHONE HYDROCHLORIDE 2 MG/ML
0.5 INJECTION INTRAMUSCULAR; INTRAVENOUS; SUBCUTANEOUS ONCE
Refills: 0 | Status: DISCONTINUED | OUTPATIENT
Start: 2021-02-11 | End: 2021-02-11

## 2021-02-11 RX ORDER — POTASSIUM PHOSPHATE, MONOBASIC POTASSIUM PHOSPHATE, DIBASIC 236; 224 MG/ML; MG/ML
15 INJECTION, SOLUTION INTRAVENOUS ONCE
Refills: 0 | Status: COMPLETED | OUTPATIENT
Start: 2021-02-11 | End: 2021-02-11

## 2021-02-11 RX ADMIN — HYDROMORPHONE HYDROCHLORIDE 0.5 MILLIGRAM(S): 2 INJECTION INTRAMUSCULAR; INTRAVENOUS; SUBCUTANEOUS at 22:01

## 2021-02-11 RX ADMIN — Medication 5 MILLIGRAM(S): at 21:18

## 2021-02-11 RX ADMIN — POTASSIUM PHOSPHATE, MONOBASIC POTASSIUM PHOSPHATE, DIBASIC 62.5 MILLIMOLE(S): 236; 224 INJECTION, SOLUTION INTRAVENOUS at 10:04

## 2021-02-11 RX ADMIN — ONDANSETRON 4 MILLIGRAM(S): 8 TABLET, FILM COATED ORAL at 11:17

## 2021-02-11 RX ADMIN — Medication 650 MILLIGRAM(S): at 11:49

## 2021-02-11 RX ADMIN — SCOPALAMINE 1 PATCH: 1 PATCH, EXTENDED RELEASE TRANSDERMAL at 07:57

## 2021-02-11 RX ADMIN — CHLORHEXIDINE GLUCONATE 1 APPLICATION(S): 213 SOLUTION TOPICAL at 11:18

## 2021-02-11 RX ADMIN — ONDANSETRON 4 MILLIGRAM(S): 8 TABLET, FILM COATED ORAL at 17:40

## 2021-02-11 RX ADMIN — SODIUM CHLORIDE 80 MILLILITER(S): 9 INJECTION, SOLUTION INTRAVENOUS at 04:52

## 2021-02-11 RX ADMIN — PANTOPRAZOLE SODIUM 40 MILLIGRAM(S): 20 TABLET, DELAYED RELEASE ORAL at 11:17

## 2021-02-11 RX ADMIN — Medication 400 MILLIGRAM(S): at 04:52

## 2021-02-11 RX ADMIN — Medication 2: at 17:38

## 2021-02-11 RX ADMIN — Medication 2: at 11:17

## 2021-02-11 RX ADMIN — Medication 2: at 08:23

## 2021-02-11 RX ADMIN — ONDANSETRON 4 MILLIGRAM(S): 8 TABLET, FILM COATED ORAL at 04:54

## 2021-02-11 RX ADMIN — Medication 2: at 21:18

## 2021-02-11 RX ADMIN — Medication 400 MILLIGRAM(S): at 18:10

## 2021-02-12 ENCOUNTER — TRANSCRIPTION ENCOUNTER (OUTPATIENT)
Age: 36
End: 2021-02-12

## 2021-02-12 ENCOUNTER — NON-APPOINTMENT (OUTPATIENT)
Age: 36
End: 2021-02-12

## 2021-02-12 VITALS
TEMPERATURE: 98 F | OXYGEN SATURATION: 100 % | RESPIRATION RATE: 17 BRPM | DIASTOLIC BLOOD PRESSURE: 95 MMHG | HEART RATE: 72 BPM | SYSTOLIC BLOOD PRESSURE: 157 MMHG

## 2021-02-12 DIAGNOSIS — Z71.89 OTHER SPECIFIED COUNSELING: ICD-10-CM

## 2021-02-12 LAB
GLUCOSE BLDC GLUCOMTR-MCNC: 146 MG/DL — HIGH (ref 70–99)
GLUCOSE BLDC GLUCOMTR-MCNC: 158 MG/DL — HIGH (ref 70–99)

## 2021-02-12 PROCEDURE — 86850 RBC ANTIBODY SCREEN: CPT

## 2021-02-12 PROCEDURE — 80048 BASIC METABOLIC PNL TOTAL CA: CPT

## 2021-02-12 PROCEDURE — 83735 ASSAY OF MAGNESIUM: CPT

## 2021-02-12 PROCEDURE — 86901 BLOOD TYPING SEROLOGIC RH(D): CPT

## 2021-02-12 PROCEDURE — C1889: CPT

## 2021-02-12 PROCEDURE — 82962 GLUCOSE BLOOD TEST: CPT

## 2021-02-12 PROCEDURE — 71045 X-RAY EXAM CHEST 1 VIEW: CPT

## 2021-02-12 PROCEDURE — 94640 AIRWAY INHALATION TREATMENT: CPT

## 2021-02-12 PROCEDURE — 85027 COMPLETE CBC AUTOMATED: CPT

## 2021-02-12 PROCEDURE — 83036 HEMOGLOBIN GLYCOSYLATED A1C: CPT

## 2021-02-12 PROCEDURE — 88307 TISSUE EXAM BY PATHOLOGIST: CPT

## 2021-02-12 PROCEDURE — 84100 ASSAY OF PHOSPHORUS: CPT

## 2021-02-12 PROCEDURE — C9399: CPT

## 2021-02-12 PROCEDURE — 36415 COLL VENOUS BLD VENIPUNCTURE: CPT

## 2021-02-12 PROCEDURE — 86900 BLOOD TYPING SEROLOGIC ABO: CPT

## 2021-02-12 RX ORDER — APIXABAN 2.5 MG/1
1 TABLET, FILM COATED ORAL
Qty: 56 | Refills: 0
Start: 2021-02-12 | End: 2021-03-11

## 2021-02-12 RX ORDER — ONDANSETRON 8 MG/1
1 TABLET, FILM COATED ORAL
Qty: 10 | Refills: 0
Start: 2021-02-12

## 2021-02-12 RX ORDER — OMEPRAZOLE 10 MG/1
1 CAPSULE, DELAYED RELEASE ORAL
Qty: 30 | Refills: 0
Start: 2021-02-12 | End: 2021-03-13

## 2021-02-12 RX ADMIN — CHLORHEXIDINE GLUCONATE 1 APPLICATION(S): 213 SOLUTION TOPICAL at 05:37

## 2021-02-12 RX ADMIN — Medication 650 MILLIGRAM(S): at 12:19

## 2021-02-12 NOTE — PROGRESS NOTE ADULT - SUBJECTIVE AND OBJECTIVE BOX
INTERVAL HPI/OVERNIGHT EVENTS: Nausea improved with decadron, sandra bCLD, OOB/IS use, SBP to 170s, hydralazine 5mg given, SBP 150s, VSS   2/10: c/o nausea/+ emesis , given decadron nausea resolved , pain controlled , VSS     STATUS POST:  Laparoscopic modified duodenal switch     POST OPERATIVE DAY #: 1    SUBJECTIVE: Patient examined bedside with chief resident. Patient reports that her nausea resolved and she is tolerating bariatric clears 4oz/hr . Patient reports she is ambulating and using incentive spirometer. Patient denies SOB, chest pain, nausea and emesis. Patient making adequate urine output.    Vital Signs Last 24 Hrs  T(C): 36.6 (11 Feb 2021 05:30), Max: 37.1 (11 Feb 2021 04:46)  T(F): 97.9 (11 Feb 2021 05:30), Max: 98.8 (11 Feb 2021 04:46)  HR: 67 (11 Feb 2021 05:30) (67 - 81)  BP: 149/81 (11 Feb 2021 05:30) (149/81 - 176/93)  BP(mean): --  RR: 18 (11 Feb 2021 05:30) (16 - 18)  SpO2: 99% (11 Feb 2021 05:30) (97% - 100%)  I&O's Detail    09 Feb 2021 07:01  -  10 Feb 2021 07:00  --------------------------------------------------------  IN:    IV PiggyBack: 100 mL    Lactated Ringers: 1480 mL    Lactated Ringers: 570 mL  Total IN: 2150 mL    OUT:    Oral Fluid: 0 mL    Voided (mL): 1950 mL  Total OUT: 1950 mL    Total NET: 200 mL      10 Feb 2021 07:01  -  11 Feb 2021 06:49  --------------------------------------------------------  IN:    Lactated Ringers: 1680 mL    Oral Fluid: 420 mL  Total IN: 2100 mL    OUT:    Voided (mL): 850 mL  Total OUT: 850 mL    Total NET: 1250 mL          General: NAD, resting comfortably in bed  C/V: NSR  Pulm: Nonlabored breathing, no respiratory distress  Abd: Soft, non-distended, TTP around incision site, incision clean dry and intact  Extrem: WWP, no edema, SCDs in place      LABS:                        10.5   13.88 )-----------( 246      ( 10 Feb 2021 08:07 )             32.9     02-10    139  |  101  |  17  ----------------------------<  166<H>  3.6   |  26  |  0.83    Ca    9.0      10 Feb 2021 08:07  Phos  1.8     02-10  Mg     1.9     02-10              
INTERVAL HPI/OVERNIGHT EVENTS: pt sandra BCLD better this evening, encouraged pt to ambulate and use IS, appropriate urine o/p, VSS     STATUS POST:  modified duodenal switch    POST OPERATIVE DAY #: 2    SUBJECTIVE:  pt seen at bedside, feels okay. tolerating liquids. some nausea with some spit up    MEDICATIONS  (STANDING):  chlorhexidine 2% Cloths 1 Application(s) Topical daily  dextrose 40% Gel 15 Gram(s) Oral once  dextrose 5%. 1000 milliLiter(s) (50 mL/Hr) IV Continuous <Continuous>  dextrose 5%. 1000 milliLiter(s) (100 mL/Hr) IV Continuous <Continuous>  dextrose 50% Injectable 25 Gram(s) IV Push once  dextrose 50% Injectable 12.5 Gram(s) IV Push once  dextrose 50% Injectable 25 Gram(s) IV Push once  glucagon  Injectable 1 milliGRAM(s) IntraMuscular once  insulin lispro (ADMELOG) corrective regimen sliding scale   SubCutaneous Before meals and at bedtime  ketorolac   Injectable 15 milliGRAM(s) IV Push every 6 hours  lactated ringers. 1000 milliLiter(s) (80 mL/Hr) IV Continuous <Continuous>  pantoprazole  Injectable 40 milliGRAM(s) IV Push daily    MEDICATIONS  (PRN):  acetaminophen   Tablet .. 650 milliGRAM(s) Oral every 6 hours PRN Moderate Pain (4 - 6)  ondansetron Injectable 4 milliGRAM(s) IV Push every 6 hours PRN Nausea      Vital Signs Last 24 Hrs  T(C): 36.8 (10 Feb 2021 05:03), Max: 37.7 (09 Feb 2021 13:26)  T(F): 98.3 (10 Feb 2021 05:03), Max: 99.9 (09 Feb 2021 13:26)  HR: 81 (10 Feb 2021 05:03) (77 - 99)  BP: 160/99 (10 Feb 2021 05:03) (156/81 - 164/89)  BP(mean): --  RR: 18 (10 Feb 2021 05:03) (17 - 18)  SpO2: 96% (10 Feb 2021 05:03) (95% - 97%)    PHYSICAL EXAM:      Constitutional: A&Ox3    Respiratory: non labored breathing, no respiratory distress    Cardiovascular: NSR, RRR    Gastrointestinal: soft, nondistended, mild incisional tenderness, incisions c/d/i    Extremities: (-) edema                  I&O's Detail    09 Feb 2021 07:01  -  10 Feb 2021 07:00  --------------------------------------------------------  IN:    IV PiggyBack: 100 mL    Lactated Ringers: 1180 mL    Lactated Ringers: 570 mL  Total IN: 1850 mL    OUT:    Oral Fluid: 0 mL    Voided (mL): 1650 mL  Total OUT: 1650 mL    Total NET: 200 mL          LABS:                        10.2   14.08 )-----------( 223      ( 09 Feb 2021 06:40 )             31.0     02-09    137  |  102  |  15  ----------------------------<  207<H>  3.5   |  26  |  0.67    Ca    8.9      09 Feb 2021 06:40  Phos  2.6     02-09  Mg     1.8     02-09            RADIOLOGY & ADDITIONAL STUDIES:
INTERVAL HPI/OVERNIGHT EVENTS: still exp min to mod nausea, drinking as tolerated, VSS     STATUS POST: 2/8: mDS    POST OPERATIVE DAY #: 4    SUBJECTIVE: Pt seen and examined at bedside this am by surgery team. Tolerating diet, feels better today. pain well controlled. Wants to go home today. Denies f/n/v/cp/sob.    MEDICATIONS  (STANDING):  chlorhexidine 2% Cloths 1 Application(s) Topical daily  dextrose 40% Gel 15 Gram(s) Oral once  dextrose 5%. 1000 milliLiter(s) (50 mL/Hr) IV Continuous <Continuous>  dextrose 5%. 1000 milliLiter(s) (100 mL/Hr) IV Continuous <Continuous>  dextrose 50% Injectable 25 Gram(s) IV Push once  dextrose 50% Injectable 12.5 Gram(s) IV Push once  dextrose 50% Injectable 25 Gram(s) IV Push once  glucagon  Injectable 1 milliGRAM(s) IntraMuscular once  insulin lispro (ADMELOG) corrective regimen sliding scale   SubCutaneous Before meals and at bedtime  melatonin 5 milliGRAM(s) Oral at bedtime  pantoprazole  Injectable 40 milliGRAM(s) IV Push daily    MEDICATIONS  (PRN):  acetaminophen    Suspension .. 650 milliGRAM(s) Oral every 6 hours PRN Severe Pain (7 - 10)  ondansetron Injectable 4 milliGRAM(s) IV Push every 6 hours PRN Nausea    Vital Signs Last 24 Hrs  T(C): 36.9 (12 Feb 2021 09:44), Max: 37.3 (12 Feb 2021 00:21)  T(F): 98.4 (12 Feb 2021 09:44), Max: 99.1 (12 Feb 2021 00:21)  HR: 72 (12 Feb 2021 09:44) (64 - 76)  BP: 157/95 (12 Feb 2021 09:44) (151/81 - 169/98)  BP(mean): 93 (12 Feb 2021 05:34) (93 - 107)  RR: 17 (12 Feb 2021 09:44) (17 - 18)  SpO2: 100% (12 Feb 2021 09:44) (96% - 100%)    PHYSICAL EXAM:    Constitutional: A&Ox3, NAD    Respiratory: non labored breathing, no respiratory distress    Cardiovascular: NSR, RRR    Gastrointestinal: abdomen soft and obese, nd, appropriately ttp to incisions. Dressings c/d/i.     Extremities: wwp, no calf tenderness or edema. SCDs in place       I&O's Detail    11 Feb 2021 07:01  -  12 Feb 2021 07:00  --------------------------------------------------------  IN:    IV PiggyBack: 250 mL    IV PiggyBack: 100 mL    Lactated Ringers: 640 mL    Oral Fluid: 170 mL  Total IN: 1160 mL    OUT:    Voided (mL): 1475 mL  Total OUT: 1475 mL    Total NET: -315 mL      12 Feb 2021 07:01  -  12 Feb 2021 10:01  --------------------------------------------------------  IN:  Total IN: 0 mL    OUT:    Voided (mL): 200 mL  Total OUT: 200 mL    Total NET: -200 mL          LABS:                        9.6    12.07 )-----------( 221      ( 11 Feb 2021 06:52 )             29.6     02-11    143  |  107  |  18  ----------------------------<  156<H>  4.0   |  27  |  0.76    Ca    9.4      11 Feb 2021 06:52  Phos  2.3     02-11  Mg     2.0     02-11            RADIOLOGY & ADDITIONAL STUDIES:
INTERVAL HPI/OVERNIGHT EVENTS: c/o of pain on rounds, encouraged pt to ambulate and use IS, good urine o/p, 3 episodes of spit up/emesis, reglan 1x, sbp in 160s    STATUS POST:  modified duodenal switch    POST OPERATIVE DAY #: 1    SUBJECTIVE:  pt seen at bedside, not feeling well. emesis overnight, having nausea. some abdominal pain. ambulating    MEDICATIONS  (STANDING):  chlorhexidine 2% Cloths 1 Application(s) Topical daily  dextrose 40% Gel 15 Gram(s) Oral once  dextrose 5%. 1000 milliLiter(s) (50 mL/Hr) IV Continuous <Continuous>  dextrose 5%. 1000 milliLiter(s) (100 mL/Hr) IV Continuous <Continuous>  dextrose 50% Injectable 25 Gram(s) IV Push once  dextrose 50% Injectable 12.5 Gram(s) IV Push once  dextrose 50% Injectable 25 Gram(s) IV Push once  glucagon  Injectable 1 milliGRAM(s) IntraMuscular once  insulin lispro (ADMELOG) corrective regimen sliding scale   SubCutaneous Before meals and at bedtime  ketorolac   Injectable 15 milliGRAM(s) IV Push every 6 hours  lactated ringers. 1000 milliLiter(s) (190 mL/Hr) IV Continuous <Continuous>  pantoprazole  Injectable 40 milliGRAM(s) IV Push daily    MEDICATIONS  (PRN):  acetaminophen   Tablet .. 650 milliGRAM(s) Oral every 6 hours PRN Moderate Pain (4 - 6)  ondansetron Injectable 4 milliGRAM(s) IV Push every 6 hours PRN Nausea      Vital Signs Last 24 Hrs  T(C): 37.3 (09 Feb 2021 04:52), Max: 37.3 (09 Feb 2021 04:52)  T(F): 99.2 (09 Feb 2021 04:52), Max: 99.2 (09 Feb 2021 04:52)  HR: 85 (09 Feb 2021 04:52) (68 - 90)  BP: 161/82 (09 Feb 2021 04:52) (154/83 - 177/79)  BP(mean): 111 (08 Feb 2021 13:18) (107 - 121)  RR: 18 (09 Feb 2021 04:52) (13 - 18)  SpO2: 98% (09 Feb 2021 04:52) (96% - 100%)    PHYSICAL EXAM:      Constitutional: A&Ox3    Respiratory: non labored breathing, no respiratory distress    Cardiovascular: NSR, RRR    Gastrointestinal: soft, nondistended, appropriate incisional tenderness, incisions c/d/i    Extremities: (-) edema                  I&O's Detail    08 Feb 2021 07:01  -  09 Feb 2021 06:54  --------------------------------------------------------  IN:    IV PiggyBack: 200 mL    Lactated Ringers: 3515 mL  Total IN: 3715 mL    OUT:    Oral Fluid: 0 mL    Voided (mL): 3100 mL  Total OUT: 3100 mL    Total NET: 615 mL          LABS:                        10.2   14.08 )-----------( 223      ( 09 Feb 2021 06:40 )             31.0                 RADIOLOGY & ADDITIONAL STUDIES:
POST-OPERATIVE NOTE    Procedure: laparoscopic modified duodenal switch    Diagnosis/Indication: morbid obesity    Surgeon: Dr. Woods    S: Pt has no complaints. Denies CP, SOB, PETTY, calf tenderness. Pain controlled with medication. Denies nausea, vomiting.    O:  T(C): 36.3 (02-08-21 @ 11:18), Max: 36.3 (02-08-21 @ 11:18)  T(F): 97.4 (02-08-21 @ 11:18), Max: 97.4 (02-08-21 @ 11:18)  HR: 80 (02-08-21 @ 13:47) (68 - 80)  BP: 158/79 (02-08-21 @ 13:47) (158/79 - 177/79)  RR: 17 (02-08-21 @ 13:47) (13 - 17)  SpO2: 100% (02-08-21 @ 13:47) (100% - 100%)  Wt(kg): --            Gen: NAD, resting comfortably in bed  C/V: NSR  Pulm: Nonlabored breathing, no respiratory distress  Abd: obese, soft, nontender, nondistended, surgical incisions clean, dry, and intact.  Extrem: WWP, no calf edema or tenderness, SCDs in place

## 2021-02-12 NOTE — DISCHARGE NOTE NURSING/CASE MANAGEMENT/SOCIAL WORK - NSDCPNINST_GEN_ALL_CORE
Call Dr. Woods if you have any questions or concerns. Due to pandemic corona virus- please continue proper hand washing/ hand hygiene, wearing mask, social distancing. Educational material given: Surgical Wound Discharge Instructions.

## 2021-02-12 NOTE — DISCHARGE NOTE NURSING/CASE MANAGEMENT/SOCIAL WORK - PATIENT PORTAL LINK FT
You can access the FollowMyHealth Patient Portal offered by [facility] by registering at the following website: [email or web address]. By joining [facility]’s FollowMyHealth portal, you will also be able to view your health information using other applications (apps) compatible with our system.

## 2021-02-12 NOTE — PROGRESS NOTE ADULT - ASSESSMENT
35 year old with history of  MO (BMI 51), DM, HTN, iron deficiency anemia, PSH; appendectomy, cholecystectomy and ovarian cystectomy now s/p modified Duodenal Switch    bCLD/IVF  pain nausea control  ppi  SCDs/IS  AM labs  encourage PO intake  encourage ambulation  poss dc this afternoon
36 yo PMH: MO, DM, HTN, iron deficiency anemia, PSH; appendectomy, cholecystectomy and ovarian cystectomy now s/p modified Duodenal Switch    bCLD/IVF  pain nausea control  ppi  SCDs/IS  AM labs  encourage ambulation  encourage PO intake
A/P: 35y Female s/p above procedure  Diet:bCLD  IVF:  cc/hr  Pain/nausea control  SQH/SCDs/OOBA/IS  Dispo pending pain control, PO tolerance, clinical improvement
35 year old with history of  MO (BMI 51), DM, HTN, iron deficiency anemia, PSH; appendectomy, cholecystectomy and ovarian cystectomy now s/p modified Duodenal Switch    -Pain/nausea control  -BCLD, IVF  -Protonix  -Eliquis POD3 x30d  -SCDs, OOB/A, IS  -AM labs  - Discharge home today 
35 year old with history of  MO (BMI 51), DM, HTN, iron deficiency anemia, PSH; appendectomy, cholecystectomy and ovarian cystectomy now s/p modified Duodenal Switch on 2/8.    BCLD/IVF  Pain/nausea control prn  PPI  SCDs/IS  AM labs  Dc home today w/ Eliquis

## 2021-02-16 PROBLEM — E11.9 TYPE 2 DIABETES MELLITUS WITHOUT COMPLICATIONS: Chronic | Status: ACTIVE | Noted: 2021-02-05

## 2021-02-16 PROBLEM — E78.5 HYPERLIPIDEMIA, UNSPECIFIED: Chronic | Status: ACTIVE | Noted: 2021-02-05

## 2021-02-16 PROBLEM — I10 ESSENTIAL (PRIMARY) HYPERTENSION: Chronic | Status: ACTIVE | Noted: 2021-02-05

## 2021-02-16 PROBLEM — J45.909 UNSPECIFIED ASTHMA, UNCOMPLICATED: Chronic | Status: ACTIVE | Noted: 2021-02-05

## 2021-02-16 PROBLEM — N80.9 ENDOMETRIOSIS, UNSPECIFIED: Chronic | Status: ACTIVE | Noted: 2021-02-05

## 2021-02-16 PROBLEM — E66.01 MORBID (SEVERE) OBESITY DUE TO EXCESS CALORIES: Chronic | Status: ACTIVE | Noted: 2021-02-05

## 2021-02-17 DIAGNOSIS — D50.9 IRON DEFICIENCY ANEMIA, UNSPECIFIED: ICD-10-CM

## 2021-02-17 DIAGNOSIS — I10 ESSENTIAL (PRIMARY) HYPERTENSION: ICD-10-CM

## 2021-02-17 DIAGNOSIS — Z91.013 ALLERGY TO SEAFOOD: ICD-10-CM

## 2021-02-17 DIAGNOSIS — K66.0 PERITONEAL ADHESIONS (POSTPROCEDURAL) (POSTINFECTION): ICD-10-CM

## 2021-02-17 DIAGNOSIS — E66.9 OBESITY, UNSPECIFIED: ICD-10-CM

## 2021-02-17 DIAGNOSIS — J45.909 UNSPECIFIED ASTHMA, UNCOMPLICATED: ICD-10-CM

## 2021-02-17 DIAGNOSIS — E66.01 MORBID (SEVERE) OBESITY DUE TO EXCESS CALORIES: ICD-10-CM

## 2021-02-17 DIAGNOSIS — E11.9 TYPE 2 DIABETES MELLITUS WITHOUT COMPLICATIONS: ICD-10-CM

## 2021-03-03 ENCOUNTER — APPOINTMENT (OUTPATIENT)
Dept: BARIATRICS | Facility: CLINIC | Age: 36
End: 2021-03-03
Payer: MEDICARE

## 2021-03-03 VITALS
HEIGHT: 68 IN | BODY MASS INDEX: 44.41 KG/M2 | WEIGHT: 293 LBS | TEMPERATURE: 97.1 F | OXYGEN SATURATION: 98 % | DIASTOLIC BLOOD PRESSURE: 83 MMHG | SYSTOLIC BLOOD PRESSURE: 138 MMHG | HEART RATE: 87 BPM

## 2021-03-03 PROCEDURE — 99024 POSTOP FOLLOW-UP VISIT: CPT

## 2021-03-04 NOTE — ASSESSMENT
[FreeTextEntry1] : 35yr old female 3 weeks s/p MDS. Having a normal postoperative course, down approximately 30lbs. Denies pain, n/v, fevers or heartburn. Tolerating stage III diet well but not having enough protein intake, discussed recommendations to increase protein intake.

## 2021-03-04 NOTE — PLAN
[FreeTextEntry1] : Educated on  calcium and iron vitamins for absorption\par Follow up with dietician for different protein shakes options and food sources to increase intake\par Begin exercising, no heavy lifting until 6 weeks postop\par Flu in 4-6 weeks

## 2021-03-04 NOTE — HISTORY OF PRESENT ILLNESS
[de-identified] : Ms. Arredondo is a 35yr old female presenting for a postoperative visit s/p MDS on 2/8/21. Pt is having a normal postoperative course, denies pain, n/v, fevers, heartburn or PO intolerances. Decreased humalog dose to 5 units and continues taking her antihypertensives. Her only complain is what seems to be some trauma from intubation but it is improving. Pt is only drinking one shake and a half + eggs, cheese and sometimes chicken. Discussed the importance of increasing protein intake to 80-100g daily. She is taking her daily vitamins and having 1-2 formed bms daily. Spent over 10 minutes discussing postoperative course with patient.

## 2021-03-09 ENCOUNTER — APPOINTMENT (OUTPATIENT)
Dept: OBGYN | Facility: CLINIC | Age: 36
End: 2021-03-09
Payer: MEDICARE

## 2021-03-09 VITALS
BODY MASS INDEX: 44.41 KG/M2 | WEIGHT: 293 LBS | SYSTOLIC BLOOD PRESSURE: 130 MMHG | DIASTOLIC BLOOD PRESSURE: 80 MMHG | HEIGHT: 68 IN

## 2021-03-09 DIAGNOSIS — N80.1 ENDOMETRIOSIS OF OVARY: ICD-10-CM

## 2021-03-09 DIAGNOSIS — M25.559 PAIN IN UNSPECIFIED HIP: ICD-10-CM

## 2021-03-09 PROCEDURE — 99204 OFFICE O/P NEW MOD 45 MIN: CPT

## 2021-03-09 NOTE — PHYSICAL EXAM
[Appropriately responsive] : appropriately responsive [Alert] : alert [No Acute Distress] : no acute distress [Soft] : soft [Non-tender] : non-tender [Labia Majora] : normal [Labia Minora] : normal [Normal] : normal [Uterine Adnexae] : normal [Adnexa Tenderness On The Right] : tender  [FreeTextEntry6] : limited exam, pelvic organs were mobile

## 2021-03-09 NOTE — HISTORY OF PRESENT ILLNESS
[Patient reported PAP Smear was normal] : Patient reported PAP Smear was normal [N] : Patient does not use contraception [FreeTextEntry1] : 36 yo presents for initial consult due to endometriosis and chronic pain. Jerri reports history of pain has not had a regular gynecologist in years due to insurance issues.  Had a pap smear 9/2020 and told she had a small cyst.  11/2020 had emergency surgery at [facility] for endometrioma and had appendix removed.  She stopped OCP in January/december due to planned bariatric surgery.  She was only one it for 2 weeks unsure what pill. She brought no medical records with her. \par  [PapSmeardate] : 09/2020 [LMPDate] : 02/28/2021 [MensesFreq] : 28 [MensesLength] : 5 [PGxTotal] : 1 [PGHxABSpont] : 1

## 2021-03-09 NOTE — DISCUSSION/SUMMARY
[FreeTextEntry1] : 34 yo with history of endometriosis did not take OCP due to recent surgery has pills at home. Fully ambulating and recovered from her surgery. \par -obtain copy of pathology and operative note patient to bring records during her next visit\par -restart OCP with menses, we discussed common symptoms such as nausea, irregular bleeding first 3 months, breast tenderness, breast enlargement, vaginal dryness and other symptoms that need to be discussed with provider such as hypertension, headache, depression, anxiety, swollen painful leg, shortness of breath. All questions and concerns addressed, patient expressed understanding. She will check her blood pressure in few weeks and contact the office if greater than 140/90. \par -f/u 2 weeks

## 2021-03-19 ENCOUNTER — APPOINTMENT (OUTPATIENT)
Dept: ENDOCRINOLOGY | Facility: CLINIC | Age: 36
End: 2021-03-19
Payer: MEDICARE

## 2021-03-19 VITALS
HEART RATE: 70 BPM | SYSTOLIC BLOOD PRESSURE: 147 MMHG | WEIGHT: 293 LBS | RESPIRATION RATE: 16 BRPM | DIASTOLIC BLOOD PRESSURE: 91 MMHG | HEIGHT: 68 IN | BODY MASS INDEX: 44.41 KG/M2

## 2021-03-19 DIAGNOSIS — Z87.09 PERSONAL HISTORY OF OTHER DISEASES OF THE RESPIRATORY SYSTEM: ICD-10-CM

## 2021-03-19 DIAGNOSIS — Z86.79 PERSONAL HISTORY OF OTHER DISEASES OF THE CIRCULATORY SYSTEM: ICD-10-CM

## 2021-03-19 DIAGNOSIS — Z78.9 OTHER SPECIFIED HEALTH STATUS: ICD-10-CM

## 2021-03-19 LAB
GLUCOSE BLDC GLUCOMTR-MCNC: 77
HBA1C MFR BLD HPLC: 6.7

## 2021-03-19 PROCEDURE — 99204 OFFICE O/P NEW MOD 45 MIN: CPT | Mod: 25,GC

## 2021-03-19 PROCEDURE — 82962 GLUCOSE BLOOD TEST: CPT

## 2021-03-19 PROCEDURE — 83036 HEMOGLOBIN GLYCOSYLATED A1C: CPT | Mod: QW

## 2021-03-19 RX ORDER — FLASH GLUCOSE SENSOR
KIT MISCELLANEOUS
Qty: 1 | Refills: 0 | Status: ACTIVE | COMMUNITY
Start: 2021-03-19 | End: 1900-01-01

## 2021-03-19 RX ORDER — FLASH GLUCOSE SENSOR
KIT MISCELLANEOUS
Qty: 6 | Refills: 3 | Status: ACTIVE | COMMUNITY
Start: 2021-03-19 | End: 1900-01-01

## 2021-03-25 ENCOUNTER — RESULT REVIEW (OUTPATIENT)
Age: 36
End: 2021-03-25

## 2021-03-25 ENCOUNTER — OUTPATIENT (OUTPATIENT)
Dept: OUTPATIENT SERVICES | Facility: HOSPITAL | Age: 36
LOS: 1 days | End: 2021-03-25

## 2021-03-25 ENCOUNTER — APPOINTMENT (OUTPATIENT)
Dept: ULTRASOUND IMAGING | Facility: CLINIC | Age: 36
End: 2021-03-25
Payer: MEDICARE

## 2021-03-25 DIAGNOSIS — Z98.890 OTHER SPECIFIED POSTPROCEDURAL STATES: Chronic | ICD-10-CM

## 2021-03-25 DIAGNOSIS — Z90.49 ACQUIRED ABSENCE OF OTHER SPECIFIED PARTS OF DIGESTIVE TRACT: Chronic | ICD-10-CM

## 2021-03-25 PROCEDURE — 76830 TRANSVAGINAL US NON-OB: CPT | Mod: 26

## 2021-03-25 PROCEDURE — 76856 US EXAM PELVIC COMPLETE: CPT | Mod: 26

## 2021-03-29 ENCOUNTER — NON-APPOINTMENT (OUTPATIENT)
Age: 36
End: 2021-03-29

## 2021-04-07 ENCOUNTER — APPOINTMENT (OUTPATIENT)
Dept: ENDOCRINOLOGY | Facility: CLINIC | Age: 36
End: 2021-04-07

## 2021-04-08 ENCOUNTER — APPOINTMENT (OUTPATIENT)
Dept: OBGYN | Facility: CLINIC | Age: 36
End: 2021-04-08
Payer: MEDICARE

## 2021-04-08 VITALS — DIASTOLIC BLOOD PRESSURE: 80 MMHG | SYSTOLIC BLOOD PRESSURE: 130 MMHG

## 2021-04-08 DIAGNOSIS — N94.89 OTHER SPECIFIED CONDITIONS ASSOCIATED WITH FEMALE GENITAL ORGANS AND MENSTRUAL CYCLE: ICD-10-CM

## 2021-04-08 PROCEDURE — 99214 OFFICE O/P EST MOD 30 MIN: CPT

## 2021-04-16 NOTE — ASU PATIENT PROFILE, ADULT - AS SC BRADEN SENSORY
"    Preventive Care at the 1 Year Visit  Growth Measurements & Percentiles  Head Circumference: 19.25\" (48.9 cm) (83 %, Source: CDC 0-36 Months) 83 %ile based on CDC 0-36 Months head circumference-for-age data using vitals from 6/19/2017.   Weight: 28 lbs 13 oz / 13.1 kg (actual weight) / 73 %ile based on CDC 2-20 Years weight-for-age data using vitals from 6/19/2017.   Length: 2' 11.75\" / 90.8 cm 93 %ile based on CDC 2-20 Years stature-for-age data using vitals from 6/19/2017.   Weight for length: 46 %ile based on [facility] 2-20 Years weight-for-recumbent length data using vitals from 6/19/2017.    Your child s next Preventive Check-up will be at 1 years of age    [email or web address]- recommended web site with reliable health and parenting information    Development  At this age, your child may:    climb and go down steps alone, one step at a time, holding the railing or holding someone s hand    open doors and climb on furniture    use a cup and spoon well    kick a ball    throw a ball overhand    take off clothing    stack five or six blocks    have a vocabulary of at least 20 to 50 words, make two-word phrases and call herself by name    respond to two-part verbal commands    show interest in toilet training    enjoy imitating adults    show interest in helping get dressed, and washing and drying her hands    use toys well    Feeding Tips    Let your child feed herself.  It will be messy, but this is another step toward independence.    Give your child healthy snacks like fruits and vegetables.    Do not to let your child eat non-food things such as dirt, rocks or paper.  Call the clinic if your child will not stop this behavior.    Sleep    You may move your child from a crib to a regular bed, however, do not rush this until your child is ready.  This is important if your child climbs out of the crib.    Your child may or may not take naps.  If your toddler does not nap, you may want to start a  quiet " time.     He or she may  fight  sleep as a way of controlling his or her surroundings. Continue your regular nighttime routine: bath, brushing teeth and reading. This will help your child take charge of the nighttime process.    Praise your child for positive behavior.    Let your child talk about nightmares.  Provide comfort and reassurance.    If your toddler has night terrors, she may cry, look terrified, be confused and look glassy-eyed.  This typically occurs during the first half of the night and can last up to 15 minutes.  Your toddler should fall asleep after the episode.  It s common if your toddler doesn t remember what happened in the morning.  Night terrors are not a problem.  Try to not let your toddler get too tired before bed.      Safety    Use an approved toddler car seat every time your child rides in the car.   At two years of age, you may turn the car seat to face forward.  The seat must still be in the back seat.  Every child needs to be in the back seat through age 12.    Keep all medicines, cleaning supplies and poisons out of your child s reach.  Call the poison control center or your health care provider for directions in case your child swallows poison.    Put the poison control number on all phones:  1-871.521.6501.    Use sunscreen with a SPF of more than 15 when your toddler is outside.    Do not let your child play with plastic bags or latex balloons.    Always watch your child when playing outside near a street.    Make a safe play area, if possible.    Always watch your child near water.    Do not let your child run around while eating.  This will prevent choking.    Give your child safe toys.  Do not let him or her play with toys that have small or sharp parts.    Never leave your child alone in the bathtub or near water.    Do not leave your child alone in the car, even if he or she is asleep.    What Your Toddler Needs    Make sure your child is getting consistent discipline at  home and at day care.  Talk with your  provider if this isn t the case.    If you choose to use  time-out,  calmly but firmly tell your child why they are in time-out.  Time-out should be immediate.  The time-out spot should be non-threatening (for example - sit on a step).  You can use a timer that beeps at one minute, or ask your child to  come back when you are ready to say sorry.   Treat your child normally when the time-out is over.    Limit screen time (TV, computer, video games) to less than 2 hours per day.    Dental Care    Brush your child s teeth one to two times each day with a soft-bristled toothbrush.    Use a small amount (no more than pea size) of fluoridated toothpaste two times daily.    Let your child play with the toothbrush after brushing.    Your pediatric provider will speak with you regarding the need to make regular dental appointments for cleanings and check-ups starting when your child s first tooth appears.  (Your child may need fluoride supplements if you have well water.)           (4) no impairment

## 2021-04-16 NOTE — ASU PATIENT PROFILE, ADULT - PMH
Asthma    DM2 (diabetes mellitus, type 2)    Endometriosis    HLD (hyperlipidemia)    HTN (hypertension)    Morbid obesity

## 2021-04-18 ENCOUNTER — TRANSCRIPTION ENCOUNTER (OUTPATIENT)
Age: 36
End: 2021-04-18

## 2021-04-19 ENCOUNTER — RESULT REVIEW (OUTPATIENT)
Age: 36
End: 2021-04-19

## 2021-04-19 ENCOUNTER — APPOINTMENT (OUTPATIENT)
Dept: OBGYN | Facility: HOSPITAL | Age: 36
End: 2021-04-19

## 2021-04-19 ENCOUNTER — OUTPATIENT (OUTPATIENT)
Dept: OUTPATIENT SERVICES | Facility: HOSPITAL | Age: 36
LOS: 1 days | Discharge: HOME | End: 2021-04-19
Payer: MEDICARE

## 2021-04-19 VITALS
OXYGEN SATURATION: 98 % | HEART RATE: 68 BPM | SYSTOLIC BLOOD PRESSURE: 120 MMHG | RESPIRATION RATE: 16 BRPM | TEMPERATURE: 98 F | DIASTOLIC BLOOD PRESSURE: 74 MMHG

## 2021-04-19 VITALS
HEART RATE: 76 BPM | SYSTOLIC BLOOD PRESSURE: 121 MMHG | RESPIRATION RATE: 14 BRPM | OXYGEN SATURATION: 100 % | DIASTOLIC BLOOD PRESSURE: 74 MMHG

## 2021-04-19 DIAGNOSIS — Z98.890 OTHER SPECIFIED POSTPROCEDURAL STATES: Chronic | ICD-10-CM

## 2021-04-19 DIAGNOSIS — Z90.49 ACQUIRED ABSENCE OF OTHER SPECIFIED PARTS OF DIGESTIVE TRACT: Chronic | ICD-10-CM

## 2021-04-19 PROBLEM — N94.89 ENDOMETRIAL MASS: Status: ACTIVE | Noted: 2021-04-19

## 2021-04-19 LAB — GLUCOSE BLDC GLUCOMTR-MCNC: 120 MG/DL — HIGH (ref 70–99)

## 2021-04-19 PROCEDURE — 58558 HYSTEROSCOPY BIOPSY: CPT

## 2021-04-19 PROCEDURE — 88305 TISSUE EXAM BY PATHOLOGIST: CPT | Mod: 26

## 2021-04-19 RX ORDER — KETOROLAC TROMETHAMINE 30 MG/ML
30 SYRINGE (ML) INJECTION EVERY 8 HOURS
Refills: 0 | Status: DISCONTINUED | OUTPATIENT
Start: 2021-04-19 | End: 2021-04-20

## 2021-04-19 RX ORDER — ONDANSETRON 8 MG/1
8 TABLET, FILM COATED ORAL EVERY 8 HOURS
Refills: 0 | Status: DISCONTINUED | OUTPATIENT
Start: 2021-04-19 | End: 2021-04-20

## 2021-04-19 RX ORDER — ACETAMINOPHEN 500 MG
1000 TABLET ORAL EVERY 6 HOURS
Refills: 0 | Status: DISCONTINUED | OUTPATIENT
Start: 2021-04-19 | End: 2021-04-20

## 2021-04-19 RX ORDER — SIMETHICONE 80 MG/1
80 TABLET, CHEWABLE ORAL EVERY 6 HOURS
Refills: 0 | Status: DISCONTINUED | OUTPATIENT
Start: 2021-04-19 | End: 2021-04-20

## 2021-04-19 NOTE — HISTORY OF PRESENT ILLNESS
[FreeTextEntry1] : 37 yo presents for follow-up after pelvic ultrasound. Ultrasound showed enlarged uterus with 3.5 cm IM fundal myoma and 0.8cm endometrial polyp. Normal ovaries.  She has abnormal uterine bleeding.  [LMPDate] : 04/08/2021

## 2021-04-19 NOTE — PACU DISCHARGE NOTE - COMMENTS
Pt axo x4.Ambulated to the bathroom and room, voided. Tolerated PO diet. Discharge instruction and medication education provided, pt verbalized understanding. IV catheter removed. denies pain. Pt left the recovery room ambulatory accompanied by the PCA, being escorted home by her mother.

## 2021-04-19 NOTE — BRIEF OPERATIVE NOTE - OPERATION/FINDINGS
Normal external genitalia. Hysteroscopy performed and 3 endometrial masses were visualized. Myosure was used for resection of the masses. Sharp curettage was then performed. Excellent hemostasis, EBL minimal.

## 2021-04-20 PROCEDURE — 82962 GLUCOSE BLOOD TEST: CPT

## 2021-04-20 PROCEDURE — 58558 HYSTEROSCOPY BIOPSY: CPT

## 2021-04-20 PROCEDURE — 88305 TISSUE EXAM BY PATHOLOGIST: CPT

## 2021-04-22 LAB — SURGICAL PATHOLOGY STUDY: SIGNIFICANT CHANGE UP

## 2021-04-26 ENCOUNTER — APPOINTMENT (OUTPATIENT)
Dept: BARIATRICS | Facility: CLINIC | Age: 36
End: 2021-04-26
Payer: MEDICARE

## 2021-04-26 PROCEDURE — 97803 MED NUTRITION INDIV SUBSEQ: CPT | Mod: 95

## 2021-04-28 ENCOUNTER — APPOINTMENT (OUTPATIENT)
Dept: ENDOCRINOLOGY | Facility: CLINIC | Age: 36
End: 2021-04-28

## 2021-05-04 ENCOUNTER — APPOINTMENT (OUTPATIENT)
Dept: OBGYN | Facility: CLINIC | Age: 36
End: 2021-05-04

## 2021-05-11 ENCOUNTER — APPOINTMENT (OUTPATIENT)
Dept: OBGYN | Facility: CLINIC | Age: 36
End: 2021-05-11
Payer: MEDICARE

## 2021-05-11 VITALS
SYSTOLIC BLOOD PRESSURE: 140 MMHG | DIASTOLIC BLOOD PRESSURE: 88 MMHG | TEMPERATURE: 96.8 F | WEIGHT: 268 LBS | HEIGHT: 68 IN | BODY MASS INDEX: 40.62 KG/M2

## 2021-05-11 DIAGNOSIS — G89.29 PELVIC AND PERINEAL PAIN: ICD-10-CM

## 2021-05-11 DIAGNOSIS — B96.89 ACUTE VAGINITIS: ICD-10-CM

## 2021-05-11 DIAGNOSIS — N76.0 ACUTE VAGINITIS: ICD-10-CM

## 2021-05-11 DIAGNOSIS — R10.2 PELVIC AND PERINEAL PAIN: ICD-10-CM

## 2021-05-11 PROCEDURE — 99213 OFFICE O/P EST LOW 20 MIN: CPT

## 2021-05-19 ENCOUNTER — APPOINTMENT (OUTPATIENT)
Dept: BARIATRICS | Facility: CLINIC | Age: 36
End: 2021-05-19
Payer: MEDICARE

## 2021-05-19 VITALS
TEMPERATURE: 97.6 F | SYSTOLIC BLOOD PRESSURE: 127 MMHG | BODY MASS INDEX: 39.86 KG/M2 | WEIGHT: 263 LBS | HEART RATE: 77 BPM | HEIGHT: 68 IN | DIASTOLIC BLOOD PRESSURE: 69 MMHG | OXYGEN SATURATION: 99 %

## 2021-05-19 PROCEDURE — 99214 OFFICE O/P EST MOD 30 MIN: CPT

## 2021-05-19 RX ORDER — FAMOTIDINE 20 MG/1
20 TABLET, FILM COATED ORAL
Qty: 30 | Refills: 3 | Status: ACTIVE | COMMUNITY
Start: 2021-05-19 | End: 1900-01-01

## 2021-05-19 NOTE — PHYSICAL EXAM
[Obese] : obese [Normal] : affect appropriate [de-identified] : +incisions clean, well healed, no signs of infection +soft, benign [de-identified] : normal respiration

## 2021-05-19 NOTE — ADDENDUM
[FreeTextEntry1] : This note was written by Bianca Almanzar on 05/19/2021 acting as scribe for Dr. Woods.

## 2021-05-19 NOTE — END OF VISIT
[FreeTextEntry3] : All medical record entries made by the Scribe were at my, Dr. Woods's, discretion and personally dictated by me on 05/19/2021. I have reviewed the chart and agree that the record accurately reflects my personal performance of the history, physical exam, assessment and plan. I have also personally directed, reviewed and agreed to the chart.

## 2021-05-19 NOTE — HISTORY OF PRESENT ILLNESS
UDS / Med Count Appointment     UDS: Positive for amph & buprenorphine     Medication Count Correct: No      Medication Count Details     Total # of medication remaining in bottle: 2 (8-2 mg) Has no 12's left  Fill date on bottle: 2/12,2/17  Medication dispensed on fill date: 2/12 3(12-3) 10(8-2)  and on  2/17 28(8-2)  28(12-3)   Number of wrappers in bottle: 36-8-2mg, 31-12-3mg  Summary:     Etelvina calls the office today to report that she forgot to schedule appointment and is out of medication.  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  During wrapper count two 8-2mg strips where found and given back to patient.  Patient reports she had some confusion with how she was suppose to take her medication but now understands.  She is requesting two 12-3mg films to get her to her Thursday appointment.      [de-identified] : Jerri Arredondo returns to see us today over 3 months s/p lap MDS. Yesterday she presented to the [facility] with c/o stomach pain, nausea and not being able to keep food down. She did a CT with IV contrast which she states was unremarkable. Pt also c/o constipation. States she was diagnosed with Type I DM and is on Humalog after eating and Lantis at night. I believe patient's nausea and symptoms could be explained by hypoglycemia and reflux. I had a long discussion with the patient and outlined a daily meal plan for her that includes making her own protein shakes. Advised pt to take Pepcid at night and to decrease Lantis dose to 10 units. She will see our dieticians.\par

## 2021-06-02 NOTE — HISTORY OF PRESENT ILLNESS
[FreeTextEntry1] : 37 yo presents for follow-up visit s/p hysteroscopy with resection of endoemtrial masses on 4/19/21.  She reports regular voiding and bowel movements, denies fevers/chills/dysuria.  Not taking pain medication. Bleeding had stopped shortly after procedure. Has some vaginal discharge and pain that started few days ago.

## 2021-06-02 NOTE — DISCUSSION/SUMMARY
[FreeTextEntry1] : 35 yo s/p hysteroscopy, resection of masses and dilation and curettage presents for follow-up visit\par -given copy of pathology and operative report\par -resume all activity without limitation\par -Rx given for treatment of BV and increased pain, unable to take NSAIDs\par -follow up 4 weeks

## 2021-06-02 NOTE — PHYSICAL EXAM
[Appropriately responsive] : appropriately responsive [Alert] : alert [No Acute Distress] : no acute distress [Soft] : soft [Oriented x3] : oriented x3 [Labia Majora] : normal [Labia Minora] : normal [Normal] : normal [Uterine Adnexae] : normal

## 2021-06-08 ENCOUNTER — NON-APPOINTMENT (OUTPATIENT)
Age: 36
End: 2021-06-08

## 2021-06-18 ENCOUNTER — APPOINTMENT (OUTPATIENT)
Dept: BARIATRICS | Facility: CLINIC | Age: 36
End: 2021-06-18
Payer: MEDICARE

## 2021-06-18 DIAGNOSIS — F50.9 EATING DISORDER, UNSPECIFIED: ICD-10-CM

## 2021-06-18 PROCEDURE — 97803 MED NUTRITION INDIV SUBSEQ: CPT | Mod: 95

## 2021-06-22 PROBLEM — F50.9 EATING DISORDER: Status: ACTIVE | Noted: 2020-08-05

## 2021-07-08 NOTE — ASU PATIENT PROFILE, ADULT - PSH
H/O foot surgery  left  H/O ovarian cystectomy  right  History of appendectomy    History of cholecystectomy     6/2021

## 2021-11-10 ENCOUNTER — APPOINTMENT (OUTPATIENT)
Dept: HEMATOLOGY ONCOLOGY | Facility: CLINIC | Age: 36
End: 2021-11-10
Payer: MEDICARE

## 2021-11-10 VITALS
SYSTOLIC BLOOD PRESSURE: 118 MMHG | DIASTOLIC BLOOD PRESSURE: 75 MMHG | WEIGHT: 218 LBS | HEIGHT: 68 IN | HEART RATE: 79 BPM | TEMPERATURE: 97.1 F | BODY MASS INDEX: 33.04 KG/M2 | OXYGEN SATURATION: 98 %

## 2021-11-10 PROCEDURE — 99214 OFFICE O/P EST MOD 30 MIN: CPT | Mod: 25

## 2021-11-10 PROCEDURE — 36415 COLL VENOUS BLD VENIPUNCTURE: CPT

## 2021-11-10 RX ORDER — NORETHINDRONE 0.35 MG/1
0.35 TABLET ORAL DAILY
Qty: 3 | Refills: 3 | Status: COMPLETED | COMMUNITY
Start: 2021-04-08 | End: 2021-11-10

## 2021-11-10 RX ORDER — ACETAMINOPHEN AND CODEINE 300; 30 MG/1; MG/1
300-30 TABLET ORAL
Qty: 5 | Refills: 0 | Status: COMPLETED | COMMUNITY
Start: 2021-05-11 | End: 2021-11-10

## 2021-11-10 RX ORDER — METRONIDAZOLE 500 MG/1
500 TABLET ORAL TWICE DAILY
Qty: 10 | Refills: 0 | Status: COMPLETED | COMMUNITY
Start: 2021-05-11 | End: 2021-11-10

## 2021-11-10 NOTE — ASSESSMENT
[FreeTextEntry1] : Repeat blood work done...\par \par \par We will communicate results to patient and Dr. Woods once available

## 2021-11-10 NOTE — CONSULT LETTER
[Dear  ___] : Dear  [unfilled], [Consult Letter:] : I had the pleasure of evaluating your patient, [unfilled]. [Please see my note below.] : Please see my note below. [Consult Closing:] : Thank you very much for allowing me to participate in the care of this patient.  If you have any questions, please do not hesitate to contact me. [Sincerely,] : Sincerely, [FreeTextEntry3] : Chaya Alanis MD\par  [DrJami  ___] : Dr. ABRAHAM

## 2021-11-10 NOTE — HISTORY OF PRESENT ILLNESS
[de-identified] : 35 years old, going for weight reduction surgery was found to have anemia and vitamin D deficiency... [de-identified] : January 14, 2021 patient is here for follow-up had IV iron x2\par \par 11/10/2021 patient states she is feeling tired... Had blood work with PCP was found to have low iron... Patient does not have blood work available.

## 2021-11-13 LAB
25(OH)D3 SERPL-MCNC: 21.4 NG/ML
BASOPHILS # BLD AUTO: 0.02 K/UL
BASOPHILS NFR BLD AUTO: 0.2 %
EOSINOPHIL # BLD AUTO: 0.05 K/UL
EOSINOPHIL NFR BLD AUTO: 0.6 %
ERYTHROCYTE [SEDIMENTATION RATE] IN BLOOD BY WESTERGREN METHOD: 12 MM/HR
FERRITIN SERPL-MCNC: 84 NG/ML
HAPTOGLOB SERPL-MCNC: 67 MG/DL
HCT VFR BLD CALC: 30.1 %
HGB BLD-MCNC: 10 G/DL
IMM GRANULOCYTES NFR BLD AUTO: 0.1 %
IRON SATN MFR SERPL: 17 %
IRON SERPL-MCNC: 35 UG/DL
LDH SERPL-CCNC: 187 U/L
LYMPHOCYTES # BLD AUTO: 3.26 K/UL
LYMPHOCYTES NFR BLD AUTO: 37.5 %
MAN DIFF?: NORMAL
MCHC RBC-ENTMCNC: 32.3 PG
MCHC RBC-ENTMCNC: 33.2 GM/DL
MCV RBC AUTO: 97.1 FL
MONOCYTES # BLD AUTO: 0.48 K/UL
MONOCYTES NFR BLD AUTO: 5.5 %
NEUTROPHILS # BLD AUTO: 4.88 K/UL
NEUTROPHILS NFR BLD AUTO: 56.1 %
PLATELET # BLD AUTO: 223 K/UL
RBC # BLD: 3.1 M/UL
RBC # FLD: 14 %
TIBC SERPL-MCNC: 210 UG/DL
UIBC SERPL-MCNC: 175 UG/DL
VIT B12 SERPL-MCNC: 683 PG/ML
WBC # FLD AUTO: 8.7 K/UL

## 2021-12-01 ENCOUNTER — APPOINTMENT (OUTPATIENT)
Dept: ULTRASOUND IMAGING | Facility: HOSPITAL | Age: 36
End: 2021-12-01

## 2021-12-01 ENCOUNTER — APPOINTMENT (OUTPATIENT)
Dept: MAMMOGRAPHY | Facility: HOSPITAL | Age: 36
End: 2021-12-01

## 2021-12-01 PROCEDURE — G9005: CPT

## 2021-12-06 ENCOUNTER — APPOINTMENT (OUTPATIENT)
Dept: OBGYN | Facility: CLINIC | Age: 36
End: 2021-12-06
Payer: MEDICARE

## 2021-12-06 VITALS
HEIGHT: 68 IN | SYSTOLIC BLOOD PRESSURE: 110 MMHG | DIASTOLIC BLOOD PRESSURE: 70 MMHG | BODY MASS INDEX: 32.58 KG/M2 | WEIGHT: 215 LBS

## 2021-12-06 DIAGNOSIS — E11.9 TYPE 2 DIABETES MELLITUS W/OUT COMPLICATIONS: ICD-10-CM

## 2021-12-06 DIAGNOSIS — E66.01 MORBID (SEVERE) OBESITY DUE TO EXCESS CALORIES: ICD-10-CM

## 2021-12-06 DIAGNOSIS — N80.9 ENDOMETRIOSIS, UNSPECIFIED: ICD-10-CM

## 2021-12-06 DIAGNOSIS — Z83.3 FAMILY HISTORY OF DIABETES MELLITUS: ICD-10-CM

## 2021-12-06 DIAGNOSIS — N93.9 ABNORMAL UTERINE AND VAGINAL BLEEDING, UNSPECIFIED: ICD-10-CM

## 2021-12-06 PROCEDURE — 99214 OFFICE O/P EST MOD 30 MIN: CPT

## 2021-12-06 NOTE — COUNSELING
[Nutrition/ Exercise/ Weight Management] : nutrition, exercise, weight management [Contraception/ Emergency Contraception/ Safe Sexual Practices] : contraception, emergency contraception, safe sexual practices [Pregnancy Options] : pregnancy options [Medication Management] : medication management

## 2021-12-07 LAB — TSH SERPL-ACNC: 0.86 UIU/ML

## 2021-12-08 ENCOUNTER — APPOINTMENT (OUTPATIENT)
Dept: BARIATRICS | Facility: CLINIC | Age: 36
End: 2021-12-08
Payer: MEDICARE

## 2021-12-08 VITALS
BODY MASS INDEX: 32.96 KG/M2 | TEMPERATURE: 97.3 F | WEIGHT: 217.5 LBS | SYSTOLIC BLOOD PRESSURE: 153 MMHG | DIASTOLIC BLOOD PRESSURE: 79 MMHG | OXYGEN SATURATION: 99 % | HEIGHT: 68 IN | HEART RATE: 76 BPM

## 2021-12-08 DIAGNOSIS — E66.01 MORBID (SEVERE) OBESITY DUE TO EXCESS CALORIES: ICD-10-CM

## 2021-12-08 PROCEDURE — 99213 OFFICE O/P EST LOW 20 MIN: CPT

## 2021-12-08 NOTE — ASSESSMENT
[FreeTextEntry1] : 36 year old female presenting for a follow up 9 months s/p MDS. Patient reports having a combination of both type 1 and type II DM and that’s why she is still on insulin, managed by her pcp, unsure of her A1C; advised patient to follow up with endocrinology to evaluate further as she has been experiencing episodes of hypoglycemia. Lab work ordered to check protein and vitamin levels.

## 2021-12-08 NOTE — PHYSICAL EXAM
[Obese, well nourished, in no acute distress] : obese, well nourished, in no acute distress [Normal] : normoactive bowel sounds, soft and nontender, no hepatosplenomegaly or masses appreciated. Incisions healing appropriately without erythema or drainage [de-identified] : No incision. Hanging pubis [de-identified] : excess skin in upper arms, thighs and abdomen

## 2021-12-08 NOTE — HISTORY OF PRESENT ILLNESS
[de-identified] : Ms. Arredondo presents today for a follow up visit 9 months s/p MDS on 4/19/21. Patient reports she is feeling better, down about 120lbs so far. Denies pain, n/v, heartburn or PO intolerance. Occasionally experiences some weakness and fatigue, she has a history of iron deficiency anemia for which she is following up with hematology for iron infusions/ Otherwise she is tolerating a regular diet well but estimates having about 60g of protein daily. Compliant with multivitamin and PO iron. She is exercising regularly doing both cardio and strength training. She continues to be on Lantus 20 units qhs, but sometimes holds it if her intake is low for the day, reports glucose levels decrease to the 50s sometimes. Due to excess skin, patient complains of rashes in between abdominal folds and what seems to be hydradenitis suppurativa in between her thighs and armpits. Not relieved by medicated powders and ointments.

## 2021-12-08 NOTE — REVIEW OF SYSTEMS
[Fatigue] : fatigue [Skin Rash] : skin rash [Negative] : Allergic/Immunologic [de-identified] : rashes and abscess due to excess skin

## 2021-12-10 NOTE — PLAN
[FreeTextEntry1] : 37 yo  presents for AUB\par \par -Pt counseled on options for AUB\par -Pt offered Provera for 10 days and then start OCP, discussed pros and cons of treatment\par -Pt offered to start OCP today and discussed risks and benefits of starting OCP including: being overweight and above 36 yo. Pt denied h/o migraines, smoking, h/o DVT, PE, stroke\par -Pt offered progestin IUD\par -Pt offered expectant management\par -Pt decided to start OCP today since she does not currently have any bleeding. prescription sent pharmacy\par -Pt to f/u in 4-6 months or earlier if an AUB occurs. \par

## 2021-12-10 NOTE — HISTORY OF PRESENT ILLNESS
[Excessive Bleeding] : bleeding has been excessive [Irregular Cycle Intervals] : are  irregular [Regular Duration] : has been regular [Dysmenorrhea] : dysmenorrhea [N] : Patient is not sexually active [Y] : Positive pregnancy history [Normal Amount/Duration] :  normal amount and duration [Previously active] : previously active [Diffuse] : diffuse [Irregular Menstrual Interval] : irregular menstrual interval [Abnormal Quantity] : abnormal quantity [Heavy Bleeding] : heavy bleeding [Pain] : pain [Frequency: Q ___ days] : menstrual periods occur approximately every [unfilled] days [Women] : women [LMPDate] : 11/30/21 [MensesFreq] : 14 [MensesLength] : 4 [PGxTotal] : 1 [PGHxABSpont] : 1 [TextBox_10] : adair [FreeTextEntry1] : 11/30/21

## 2021-12-15 ENCOUNTER — APPOINTMENT (OUTPATIENT)
Dept: HEMATOLOGY ONCOLOGY | Facility: CLINIC | Age: 36
End: 2021-12-15
Payer: MEDICARE

## 2021-12-15 VITALS
TEMPERATURE: 97.4 F | SYSTOLIC BLOOD PRESSURE: 113 MMHG | WEIGHT: 215.5 LBS | DIASTOLIC BLOOD PRESSURE: 75 MMHG | HEIGHT: 68 IN | HEART RATE: 84 BPM | BODY MASS INDEX: 32.66 KG/M2 | OXYGEN SATURATION: 98 %

## 2021-12-15 PROCEDURE — 36415 COLL VENOUS BLD VENIPUNCTURE: CPT

## 2021-12-15 PROCEDURE — 99213 OFFICE O/P EST LOW 20 MIN: CPT | Mod: 25

## 2021-12-15 RX ORDER — NORETHINDRONE ACETATE AND ETHINYL ESTRADIOL 1; 20 MG/1; UG/1
1-20 TABLET ORAL
Qty: 1 | Refills: 6 | Status: COMPLETED | COMMUNITY
Start: 2021-12-06 | End: 2021-12-15

## 2021-12-22 ENCOUNTER — APPOINTMENT (OUTPATIENT)
Age: 36
End: 2021-12-22

## 2021-12-22 ENCOUNTER — OUTPATIENT (OUTPATIENT)
Dept: OUTPATIENT SERVICES | Facility: HOSPITAL | Age: 36
LOS: 1 days | End: 2021-12-22
Payer: MEDICARE

## 2021-12-22 VITALS
DIASTOLIC BLOOD PRESSURE: 84 MMHG | TEMPERATURE: 98 F | HEART RATE: 69 BPM | OXYGEN SATURATION: 99 % | SYSTOLIC BLOOD PRESSURE: 130 MMHG | RESPIRATION RATE: 18 BRPM

## 2021-12-22 DIAGNOSIS — Z90.49 ACQUIRED ABSENCE OF OTHER SPECIFIED PARTS OF DIGESTIVE TRACT: Chronic | ICD-10-CM

## 2021-12-22 DIAGNOSIS — Z98.890 OTHER SPECIFIED POSTPROCEDURAL STATES: Chronic | ICD-10-CM

## 2021-12-22 DIAGNOSIS — D50.9 IRON DEFICIENCY ANEMIA, UNSPECIFIED: ICD-10-CM

## 2021-12-22 PROCEDURE — 96365 THER/PROPH/DIAG IV INF INIT: CPT

## 2021-12-22 RX ORDER — FERUMOXYTOL 510 MG/17ML
510 INJECTION INTRAVENOUS ONCE
Refills: 0 | Status: COMPLETED | OUTPATIENT
Start: 2021-12-22 | End: 2021-12-22

## 2021-12-22 RX ADMIN — FERUMOXYTOL 510 MILLIGRAM(S): 510 INJECTION INTRAVENOUS at 14:04

## 2021-12-22 RX ADMIN — FERUMOXYTOL 117 MILLIGRAM(S): 510 INJECTION INTRAVENOUS at 13:02

## 2021-12-27 ENCOUNTER — OUTPATIENT (OUTPATIENT)
Dept: OUTPATIENT SERVICES | Facility: HOSPITAL | Age: 36
LOS: 1 days | End: 2021-12-27
Payer: MEDICARE

## 2021-12-27 ENCOUNTER — APPOINTMENT (OUTPATIENT)
Age: 36
End: 2021-12-27

## 2021-12-27 VITALS
HEIGHT: 68 IN | SYSTOLIC BLOOD PRESSURE: 144 MMHG | OXYGEN SATURATION: 98 % | TEMPERATURE: 98 F | HEART RATE: 69 BPM | DIASTOLIC BLOOD PRESSURE: 83 MMHG | RESPIRATION RATE: 18 BRPM | WEIGHT: 214.95 LBS

## 2021-12-27 DIAGNOSIS — Z98.890 OTHER SPECIFIED POSTPROCEDURAL STATES: Chronic | ICD-10-CM

## 2021-12-27 DIAGNOSIS — D50.9 IRON DEFICIENCY ANEMIA, UNSPECIFIED: ICD-10-CM

## 2021-12-27 DIAGNOSIS — Z90.49 ACQUIRED ABSENCE OF OTHER SPECIFIED PARTS OF DIGESTIVE TRACT: Chronic | ICD-10-CM

## 2021-12-27 PROCEDURE — 96365 THER/PROPH/DIAG IV INF INIT: CPT

## 2021-12-27 RX ORDER — FERUMOXYTOL 510 MG/17ML
510 INJECTION INTRAVENOUS ONCE
Refills: 0 | Status: COMPLETED | OUTPATIENT
Start: 2021-12-27 | End: 2021-12-27

## 2021-12-27 RX ADMIN — FERUMOXYTOL 510 MILLIGRAM(S): 510 INJECTION INTRAVENOUS at 09:53

## 2021-12-27 RX ADMIN — FERUMOXYTOL 117 MILLIGRAM(S): 510 INJECTION INTRAVENOUS at 08:53

## 2022-01-11 LAB
ANION GAP SERPL CALC-SCNC: 10 MMOL/L
BUN SERPL-MCNC: 16 MG/DL
CALCIUM SERPL-MCNC: 9.3 MG/DL
CHLORIDE SERPL-SCNC: 110 MMOL/L
CO2 SERPL-SCNC: 22 MMOL/L
CREAT SERPL-MCNC: 1.1 MG/DL
GLUCOSE SERPL-MCNC: 80 MG/DL
POTASSIUM SERPL-SCNC: 4.9 MMOL/L
SODIUM SERPL-SCNC: 143 MMOL/L

## 2022-02-04 LAB
25(OH)D3 SERPL-MCNC: 21.9 NG/ML
BASOPHILS # BLD AUTO: 0.03 K/UL
BASOPHILS NFR BLD AUTO: 0.3 %
EOSINOPHIL # BLD AUTO: 0.03 K/UL
EOSINOPHIL NFR BLD AUTO: 0.3 %
ERYTHROCYTE [SEDIMENTATION RATE] IN BLOOD BY WESTERGREN METHOD: 16 MM/HR
FERRITIN SERPL-MCNC: 47 NG/ML
HAPTOGLOB SERPL-MCNC: 56 MG/DL
HCT VFR BLD CALC: 30.5 %
HGB BLD-MCNC: 9.8 G/DL
IMM GRANULOCYTES NFR BLD AUTO: 0.3 %
IRON SATN MFR SERPL: 18 %
IRON SERPL-MCNC: 41 UG/DL
LDH SERPL-CCNC: 167 U/L
LYMPHOCYTES # BLD AUTO: 3.26 K/UL
LYMPHOCYTES NFR BLD AUTO: 35.6 %
MAN DIFF?: NORMAL
MCHC RBC-ENTMCNC: 31.9 PG
MCHC RBC-ENTMCNC: 32.1 GM/DL
MCV RBC AUTO: 99.3 FL
MONOCYTES # BLD AUTO: 0.47 K/UL
MONOCYTES NFR BLD AUTO: 5.1 %
NEUTROPHILS # BLD AUTO: 5.35 K/UL
NEUTROPHILS NFR BLD AUTO: 58.4 %
PLATELET # BLD AUTO: 221 K/UL
RBC # BLD: 3.07 M/UL
RBC # FLD: 14.5 %
TIBC SERPL-MCNC: 221 UG/DL
UIBC SERPL-MCNC: 181 UG/DL
VIT B12 SERPL-MCNC: 444 PG/ML
WBC # FLD AUTO: 9.17 K/UL

## 2022-02-04 NOTE — HISTORY OF PRESENT ILLNESS
[de-identified] : 35 years old, going for weight reduction surgery was found to have anemia and vitamin D deficiency... [de-identified] : January 14, 2021 patient is here for follow-up had IV iron x2\par \par 11/10/2021 patient states she is feeling tired... Had blood work with PCP was found to have low iron... Patient does not have blood work available.\par \par 12/15/2021 feels more tired and dizzy... Here for repeat blood work...

## 2022-02-04 NOTE — ASSESSMENT
[FreeTextEntry1] : Repeat blood work pt continues to have anemia..IV Iron ordered...\par \par \par Pt to see us in FU after receiving IV Iron...

## 2022-03-09 ENCOUNTER — APPOINTMENT (OUTPATIENT)
Dept: HEMATOLOGY ONCOLOGY | Facility: CLINIC | Age: 37
End: 2022-03-09

## 2022-04-11 ENCOUNTER — APPOINTMENT (OUTPATIENT)
Dept: OBGYN | Facility: CLINIC | Age: 37
End: 2022-04-11
Payer: MEDICARE

## 2022-04-11 ENCOUNTER — NON-APPOINTMENT (OUTPATIENT)
Age: 37
End: 2022-04-11

## 2022-04-11 VITALS — BODY MASS INDEX: 30.71 KG/M2 | WEIGHT: 202 LBS | DIASTOLIC BLOOD PRESSURE: 70 MMHG | SYSTOLIC BLOOD PRESSURE: 120 MMHG

## 2022-04-11 DIAGNOSIS — Z00.00 ENCOUNTER FOR GENERAL ADULT MEDICAL EXAMINATION W/OUT ABNORMAL FINDINGS: ICD-10-CM

## 2022-04-11 PROCEDURE — 99214 OFFICE O/P EST MOD 30 MIN: CPT | Mod: 25

## 2022-04-11 NOTE — PHYSICAL EXAM
[Appropriately responsive] : appropriately responsive [Alert] : alert [No Acute Distress] : no acute distress [Soft] : soft [Non-tender] : non-tender [Non-distended] : non-distended [No HSM] : No HSM [No Lesions] : no lesions [No Mass] : no mass [Oriented x3] : oriented x3 [Examination Of The Breasts] : a normal appearance [No Masses] : no breast masses were palpable [Labia Majora] : normal [Labia Minora] : normal [Normal] : normal [Enlarged ___ wks] : enlarged [unfilled] ~Uweeks [Uterine Adnexae] : normal [Adnexa Tenderness On The Right] : tender

## 2022-04-12 LAB
C TRACH RRNA SPEC QL NAA+PROBE: NOT DETECTED
CANDIDA VAG CYTO: NOT DETECTED
G VAGINALIS+PREV SP MTYP VAG QL MICRO: NOT DETECTED
N GONORRHOEA RRNA SPEC QL NAA+PROBE: NOT DETECTED
SOURCE AMPLIFICATION: NORMAL
T VAGINALIS VAG QL WET PREP: NOT DETECTED

## 2022-04-12 NOTE — REVIEW OF SYSTEMS
[Pelvic pain] : pelvic pain [Negative] : Cardiovascular [Dysuria] : no dysuria [Abn Vaginal bleeding] : no abnormal vaginal bleeding [Genital Rash/Irritation] : no genital rash/irritation

## 2022-04-12 NOTE — PLAN
[FreeTextEntry1] : 38yo  presents with RLQ pelvic pain\par -Bimanual exam done, ~ 10wk size uterus on exam. R adnexal fullness compared to L, unable to determine if mass present. Tenderness on bimanual examination\par -PaP smear, G/C, and affirm done--> f/u results\par -Pt counseled on pain management \par -Pt given referral for complete pelvic u/s\par -pt told to f/u with general surgeon for possible adhesive disease due to h/o 4 prior abd surgeries\par - Patient also advised to follow up with orthopedist given arthritis and recent significant weight loss. \par  \par

## 2022-04-12 NOTE — HISTORY OF PRESENT ILLNESS
[N] : Patient is not sexually active [Normal Amount/Duration] :  normal amount and duration [Regular Cycle Intervals] : periods have been regular [No] : Patient does not have concerns regarding sex [Previously active] : previously active [Localized] : localized [LMPDate] : 4/08/2022 [MensesFreq] : 28 [MensesLength] : 5-6 [PGxTotal] : 1 [PGHxABSpont] : 1 [Bowel Movement] : not bowel movement [TextBox_7] : RLW , lower back [TextBox_10] : cramping/sharp [FreeTextEntry1] : 4/8/2022

## 2022-04-13 ENCOUNTER — APPOINTMENT (OUTPATIENT)
Dept: ULTRASOUND IMAGING | Facility: HOSPITAL | Age: 37
End: 2022-04-13
Payer: MEDICARE

## 2022-04-13 ENCOUNTER — OUTPATIENT (OUTPATIENT)
Dept: OUTPATIENT SERVICES | Facility: HOSPITAL | Age: 37
LOS: 1 days | End: 2022-04-13
Payer: MEDICARE

## 2022-04-13 DIAGNOSIS — Z98.890 OTHER SPECIFIED POSTPROCEDURAL STATES: Chronic | ICD-10-CM

## 2022-04-13 DIAGNOSIS — Z90.49 ACQUIRED ABSENCE OF OTHER SPECIFIED PARTS OF DIGESTIVE TRACT: Chronic | ICD-10-CM

## 2022-04-13 PROCEDURE — 76856 US EXAM PELVIC COMPLETE: CPT | Mod: 26

## 2022-04-13 PROCEDURE — 76857 US EXAM PELVIC LIMITED: CPT

## 2022-04-13 PROCEDURE — 76830 TRANSVAGINAL US NON-OB: CPT | Mod: 26

## 2022-04-13 PROCEDURE — 76856 US EXAM PELVIC COMPLETE: CPT

## 2022-04-13 PROCEDURE — 76830 TRANSVAGINAL US NON-OB: CPT

## 2022-04-19 ENCOUNTER — NON-APPOINTMENT (OUTPATIENT)
Age: 37
End: 2022-04-19

## 2022-04-19 LAB — CYTOLOGY CVX/VAG DOC THIN PREP: NORMAL

## 2022-04-20 ENCOUNTER — NON-APPOINTMENT (OUTPATIENT)
Age: 37
End: 2022-04-20

## 2022-05-04 ENCOUNTER — APPOINTMENT (OUTPATIENT)
Dept: BARIATRICS | Facility: CLINIC | Age: 37
End: 2022-05-04
Payer: MEDICARE

## 2022-05-04 VITALS
TEMPERATURE: 97.2 F | BODY MASS INDEX: 30.48 KG/M2 | OXYGEN SATURATION: 98 % | HEIGHT: 68 IN | WEIGHT: 201.13 LBS | DIASTOLIC BLOOD PRESSURE: 69 MMHG | SYSTOLIC BLOOD PRESSURE: 120 MMHG | HEART RATE: 78 BPM

## 2022-05-04 PROCEDURE — 99214 OFFICE O/P EST MOD 30 MIN: CPT

## 2022-05-04 NOTE — PLAN
[FreeTextEntry1] : \par Follow up for blood work and imaging results\par Consult with dietitian for further nutritional recommendations \par

## 2022-05-04 NOTE — ASSESSMENT
[FreeTextEntry1] : 37 year old female following up 15 months s/p MDS. Upon diet recall, patient does not seem to be meeting protein requirements and following recommended diet likely causing her symptoms. Will order blood work to check nutritional status and instructed patient to follow up with nutrition to get back on tack with our dietary recommendations. Advised to follow up with hematology for DELVIN. Patient seen with Dr. Woods who recommended wearing shape wear such as girdles to help relieve pain caused by excess skin. Will order CT scan to rule out pathology and refer to plastic surgery for excess skin removal evaluation.

## 2022-05-04 NOTE — REASON FOR VISIT
VF is normal OS. [Follow-Up Visit] : a follow-up visit for [S/P Bariatric Surgery] : s/p bariatric surgery

## 2022-05-04 NOTE — HISTORY OF PRESENT ILLNESS
[de-identified] : 37 year old female following up 15 months s/p MDS on 2/2021. Patient has had over 156lbs weight loss since surgery. However, upon diet recall, seems like patient is not meeting protein and fluid requirements, likely causing fatigue and weakness. Blood work not done after last visit, will reorder to check protein levels and rule out vitamin deficiencies. Patient reports being compliant with vitamins and receiving vitamin B12 injections monthly with pcp. She reports being off of antihypertensives and diabetic medications. She also reports a 6 month history of abdominal pain in umbilical region, unrelated to food intake. Gynecological evaluation reportedly normal. Also complains of excess skin in upper arms, thighs and lower abdomen causing lower back pain, skin irritation, rashes, moisture and odor. She has had little relief with medicated powders and ointments.

## 2022-05-04 NOTE — REVIEW OF SYSTEMS
[Fatigue] : fatigue [Abdominal Pain] : abdominal pain [Negative] : Allergic/Immunologic [de-identified] : skin irritation and rashes due to excess skin in upper arms, thighs and abdomen

## 2022-05-04 NOTE — PHYSICAL EXAM
[Overweight] : overweight  [Normal] : affect appropriate [de-identified] : normoactive bowel sounds, soft but tenderness on palpation of umbilical region, no hepatosplenomegaly or masses appreciated  [de-identified] : large amount of excess skin in upper arms, thigh areas and lower abdominal area

## 2022-05-10 ENCOUNTER — NON-APPOINTMENT (OUTPATIENT)
Age: 37
End: 2022-05-10

## 2022-05-10 ENCOUNTER — APPOINTMENT (OUTPATIENT)
Dept: BARIATRICS | Facility: CLINIC | Age: 37
End: 2022-05-10

## 2022-05-11 LAB
25(OH)D3 SERPL-MCNC: 19.8 NG/ML
ALBUMIN SERPL ELPH-MCNC: 4.2 G/DL
ALP BLD-CCNC: 74 U/L
ALT SERPL-CCNC: 24 U/L
ANION GAP SERPL CALC-SCNC: 10 MMOL/L
AST SERPL-CCNC: 20 U/L
BASOPHILS # BLD AUTO: 0.02 K/UL
BASOPHILS NFR BLD AUTO: 0.2 %
BILIRUB SERPL-MCNC: 0.2 MG/DL
BUN SERPL-MCNC: 13 MG/DL
CALCIUM SERPL-MCNC: 9 MG/DL
CALCIUM SERPL-MCNC: 9 MG/DL
CHLORIDE SERPL-SCNC: 108 MMOL/L
CHOLEST SERPL-MCNC: 159 MG/DL
CO2 SERPL-SCNC: 23 MMOL/L
CREAT SERPL-MCNC: 0.83 MG/DL
EGFR: 92 ML/MIN/1.73M2
EOSINOPHIL # BLD AUTO: 0.11 K/UL
EOSINOPHIL NFR BLD AUTO: 1.3 %
ESTIMATED AVERAGE GLUCOSE: 108 MG/DL
FOLATE SERPL-MCNC: 8.2 NG/ML
GLUCOSE SERPL-MCNC: 94 MG/DL
HBA1C MFR BLD HPLC: 5.4 %
HCT VFR BLD CALC: 31.3 %
HDLC SERPL-MCNC: 74 MG/DL
HGB BLD-MCNC: 10.1 G/DL
IMM GRANULOCYTES NFR BLD AUTO: 0.2 %
IRON SERPL-MCNC: 35 UG/DL
LDLC SERPL CALC-MCNC: 66 MG/DL
LYMPHOCYTES # BLD AUTO: 3.86 K/UL
LYMPHOCYTES NFR BLD AUTO: 46.7 %
MAN DIFF?: NORMAL
MCHC RBC-ENTMCNC: 32.1 PG
MCHC RBC-ENTMCNC: 32.3 GM/DL
MCV RBC AUTO: 99.4 FL
MONOCYTES # BLD AUTO: 0.45 K/UL
MONOCYTES NFR BLD AUTO: 5.4 %
NEUTROPHILS # BLD AUTO: 3.8 K/UL
NEUTROPHILS NFR BLD AUTO: 46.2 %
NONHDLC SERPL-MCNC: 85 MG/DL
PARATHYROID HORMONE INTACT: 72 PG/ML
PLATELET # BLD AUTO: 237 K/UL
POTASSIUM SERPL-SCNC: 4.8 MMOL/L
PREALB SERPL NEPH-MCNC: 16 MG/DL
PROT SERPL-MCNC: 6.2 G/DL
RBC # BLD: 3.15 M/UL
RBC # FLD: 14.3 %
SODIUM SERPL-SCNC: 142 MMOL/L
TRIGL SERPL-MCNC: 92 MG/DL
TSH SERPL-ACNC: 2.21 UIU/ML
VIT B12 SERPL-MCNC: 1159 PG/ML
WBC # FLD AUTO: 8.26 K/UL
ZINC SERPL-MCNC: 44 UG/DL

## 2022-05-12 ENCOUNTER — EMERGENCY (EMERGENCY)
Facility: HOSPITAL | Age: 37
LOS: 1 days | Discharge: HOME | End: 2022-05-12
Attending: STUDENT IN AN ORGANIZED HEALTH CARE EDUCATION/TRAINING PROGRAM | Admitting: EMERGENCY MEDICINE
Payer: MEDICARE

## 2022-05-12 VITALS
TEMPERATURE: 98 F | OXYGEN SATURATION: 99 % | HEIGHT: 68 IN | WEIGHT: 199.96 LBS | RESPIRATION RATE: 18 BRPM | SYSTOLIC BLOOD PRESSURE: 138 MMHG | DIASTOLIC BLOOD PRESSURE: 78 MMHG | HEART RATE: 64 BPM

## 2022-05-12 DIAGNOSIS — Z90.49 ACQUIRED ABSENCE OF OTHER SPECIFIED PARTS OF DIGESTIVE TRACT: Chronic | ICD-10-CM

## 2022-05-12 DIAGNOSIS — Z98.890 OTHER SPECIFIED POSTPROCEDURAL STATES: Chronic | ICD-10-CM

## 2022-05-12 LAB
RAPID RVP RESULT: DETECTED
SARS-COV-2 RNA SPEC QL NAA+PROBE: DETECTED

## 2022-05-12 PROCEDURE — 96374 THER/PROPH/DIAG INJ IV PUSH: CPT

## 2022-05-12 PROCEDURE — 0225U NFCT DS DNA&RNA 21 SARSCOV2: CPT

## 2022-05-12 PROCEDURE — 99284 EMERGENCY DEPT VISIT MOD MDM: CPT | Mod: 25

## 2022-05-12 PROCEDURE — 99284 EMERGENCY DEPT VISIT MOD MDM: CPT | Mod: CS

## 2022-05-12 RX ORDER — KETOROLAC TROMETHAMINE 30 MG/ML
30 SYRINGE (ML) INJECTION ONCE
Refills: 0 | Status: DISCONTINUED | OUTPATIENT
Start: 2022-05-12 | End: 2022-05-12

## 2022-05-12 RX ADMIN — Medication 30 MILLIGRAM(S): at 15:34

## 2022-05-12 NOTE — ED PROVIDER NOTE - PHYSICAL EXAMINATION
VITAL SIGNS: I have reviewed nursing notes and confirm.  CONSTITUTIONAL: Well appearing, in no acute distress.   SKIN:  warm and dry, no acute rash.   HEAD:  normocephalic, atraumatic.  EYES: EOM intact; conjunctiva and sclera clear.  ENT: No nasal discharge; airway clear.   NECK: Supple; non tender. No nuchal rigidity  CARD: S1, S2 normal; no murmurs, gallops, or rubs. Regular rate and rhythm.   RESP:  Clear to auscultation b/l, no wheezes, rales or rhonchi.  ABD: Normal bowel sounds; soft; non-distended; non-tender; no guarding/ rebound.  EXT: Normal ROM. No clubbing, cyanosis or edema. 2+ pulses to b/l ue/le.  NEURO: Alert, oriented, CNs intact, normal gait and coordination, strength 5/5 in all extremities, no sensory deficits  PSYCH: Cooperative, mood and affect appropriate.

## 2022-05-12 NOTE — ED ADULT TRIAGE NOTE - CHIEF COMPLAINT QUOTE
Pt c/o headache generalized fatigue, body aches, and chills. denies any cp or sob, pt unable to take Motrin (only Tylenol) and it's ineffective.  Denies neck stiffness, endorses +photophobia, no hx of migraines.

## 2022-05-12 NOTE — ED PROVIDER NOTE - PATIENT PORTAL LINK FT
You can access the FollowMyHealth Patient Portal offered by [facility] by registering at the following website: [email or web address]. By joining [facility]’s FollowMyHealth portal, you will also be able to view your health information using other applications (apps) compatible with our system.

## 2022-05-12 NOTE — ED PROVIDER NOTE - CLINICAL SUMMARY MEDICAL DECISION MAKING FREE TEXT BOX
38 yo female with a hx of HTN, DM, HLD, asthma p/w headache, body aches and chills x3 days. Clinical picture consistent with viral URI, possibly COVID. No neuro deficits- no concern for CVA. No external signs of trauma. No red flags with headache- likely due to viral illness.     - rvp  - toradol IM

## 2022-05-12 NOTE — ED PROVIDER NOTE - NSFOLLOWUPCLINICS_GEN_ALL_ED_FT
[facility] - Primary Care  Primary Care  [address] [place] [address]  Phone: [phone]  Fax:

## 2022-05-12 NOTE — ED PROVIDER NOTE - NSFOLLOWUPINSTRUCTIONS_ED_ALL_ED_FT
Log Out.      [employer]® CareNotes®     :  [facility]  	                     GENERAL HEADACHE - Ambulatory Care           General Headache    AMBULATORY CARE:    Headache pain may be mild or severe. Common causes include stress, medicines, and head injuries. Sleep problems, allergies, and hormone changes can also cause a headache. You may have frequent headaches that have no clear cause. Pain may start in another part of your body and move to your head. Headache pain can also move to other parts of your body. A headache can cause other symptoms, such as nausea and vomiting. A severe headache may be a sign of a stroke or other serious problem that needs immediate treatment.    Have someone call your local emergency number (911 in the [place]) for any of the following:   •You have any of the following signs of a stroke: ?Numbness or drooping on one side of your face       ?Weakness in an arm or leg      ?Confusion or difficulty speaking      ?Dizziness, a severe headache, or vision loss    BE FAST SIGNS OF A STROKE             Seek care immediately if:   •You have a headache with neck stiffness and a fever.      •You have a constant headache and are vomiting.      •You have severe pain that does not get better after you take pain medicine.      •You have a headache and the pain worsens when you look into light.      •You have a headache and vision changes, such as blurred vision.      •You have a headache and are forgetful or confused.      Call your doctor if:   •You have a headache each day that does not get better, even after treatment.      •You have changes in your headaches, or new symptoms that occur when you have a headache.      •Others you live or work with also have headaches.      •You have questions or concerns about your condition or care.      Treatment may include any of the following:   •Medicines may be given to prevent or treat headache pain. Do not wait until the pain is severe to take your medicine. Ask your healthcare provider how to take the medicine safely.       •NSAIDs, such as ibuprofen, help decrease swelling, pain, and fever. This medicine is available with or without a doctor's order. NSAIDs can cause stomach bleeding or kidney problems in certain people. If you take blood thinner medicine, always ask if NSAIDs are safe for you. Always read the medicine label and follow directions. Do not give these medicines to children under 6 months of age without direction from your child's healthcare provider.      •Acetaminophen decreases pain and fever. It is available without a doctor's order. Ask how much to take and how often to take it. Follow directions. Read the labels of all other medicines you are using to see if they also contain acetaminophen, or ask your doctor or pharmacist. Acetaminophen can cause liver damage if not taken correctly.       •Antinausea medicine may be given to calm your stomach and help prevent vomiting.      Manage your symptoms:   •Rest in a dark and quiet room. This may help decrease your pain.      •Apply heat or ice as directed. Heat or ice may help decrease pain or muscle spasms. Apply heat or ice on the area for 20 minutes every 2 hours for as many days as directed. Your healthcare provider may recommend that you alternate heat and ice.      •Relax your muscles to help relieve a headache. Lie down in a comfortable position and close your eyes. Relax your muscles slowly. Start at your toes and work your way up your body. A massage or warm bath may also help relax your muscles.      Keep a headache record: Record the dates and times that you get headaches, and what you were doing before the headache started. Also record what you ate and drank in the 24 hours before the headache started. This might help your healthcare provider find the cause of your headaches and make a treatment plan. The record can also help you avoid headache triggers or manage your symptoms.    Get enough sleep: You should get 8 to 10 hours of sleep each night. Create a sleep schedule. Go to bed and wake up at the same times each day. It may be helpful to do something relaxing before bed. Do not watch television right before bed.    Do not smoke: Nicotine and other chemicals in cigarettes and cigars can trigger a headache or make it worse. Ask your healthcare provider for information if you currently smoke and need help to quit. E-cigarettes or smokeless tobacco still contain nicotine. Talk to your healthcare provider before you use these products.     Drink liquids as directed: You may need to drink more liquid to prevent dehydration. Dehydration can cause a headache. Ask your healthcare provider how much liquid to drink each day and which liquids are best for you.     Limit caffeine and alcohol as directed: Your headaches may be triggered by caffeine or alcohol. You may also develop a headache if you drink caffeine regularly and suddenly stop.    Eat a variety of healthy foods: Do not skip meals. Too little food can trigger a headache. Include fruits, vegetables, whole-grain breads, low-fat dairy products, beans, lean meat, and fish. Do not have trigger foods, such as chocolate and red wine. Foods that contain gluten, nitrates, MSG, or artificial sweeteners may also trigger a headache.  Healthy Foods         Follow up with your doctor as directed: Write down your questions so you remember to ask them during your visits.        © Copyright [employer] 2022           back to top                          © Copyright [employer] 2022

## 2022-05-12 NOTE — ED ADULT NURSE NOTE - HIV OFFER
Impression:
 Go to ER if drooling or unable to swallow. She states she sees cardiologist Dr. Ponce.  Will need cardiac clearance with anticoagulation use and recent pacemaker placement.  She does not wish to try ppi or h2ra.   Opt out

## 2022-05-12 NOTE — ED PROVIDER NOTE - OBJECTIVE STATEMENT
38 yo female with a hx of HTN, DM, HLD, asthma p/w headache, body aches and chills x3 days. No fevers, SOB, chest pain, abdominal pain, n/v/d/c, neck pain/stiffness, weakness, gait problem, numbness, tingling, visual disturbance, rashes, syncope, dizziness, photo/phonophobia. COVID vaccinated. No sick contacts or recent travel. Headache is mild, diffuse, minimal alleviated by Tylenol. Similar to prior headaches. No trauma or falls.

## 2022-05-12 NOTE — ED ADULT NURSE NOTE - OBJECTIVE STATEMENT
pt. a&ox4 ambulatory comes to ED c/o worsening headache x 3 days, denies visual disturbances, n/v/d, cp, sob. Pt. reports worsened on position change, denies hx of migraines. Pt. reports mild photosensitivity. Pt. denies numbness / tingling in extremities, pain radiating down back, denies point tenderness on exam. pt. a&ox4 ambulatory comes to ED c/o worsening headache x 3 days, denies visual disturbances, n/v/d, cp, sob. Pt. reports worsened on position change, denies hx of migraines. Pt. reports mild photosensitivity. Pt. denies numbness / tingling in extremities, pain radiating down back, denies point tenderness on exam. Pt. denies infectious s/s of fever, sore throat.

## 2022-05-15 ENCOUNTER — APPOINTMENT (OUTPATIENT)
Dept: CT IMAGING | Facility: HOSPITAL | Age: 37
End: 2022-05-15

## 2022-05-16 DIAGNOSIS — Z90.49 ACQUIRED ABSENCE OF OTHER SPECIFIED PARTS OF DIGESTIVE TRACT: ICD-10-CM

## 2022-05-16 DIAGNOSIS — U07.1 COVID-19: ICD-10-CM

## 2022-05-16 DIAGNOSIS — E78.5 HYPERLIPIDEMIA, UNSPECIFIED: ICD-10-CM

## 2022-05-16 DIAGNOSIS — Z79.01 LONG TERM (CURRENT) USE OF ANTICOAGULANTS: ICD-10-CM

## 2022-05-16 DIAGNOSIS — R51.9 HEADACHE, UNSPECIFIED: ICD-10-CM

## 2022-05-16 DIAGNOSIS — J45.909 UNSPECIFIED ASTHMA, UNCOMPLICATED: ICD-10-CM

## 2022-05-16 DIAGNOSIS — Z79.4 LONG TERM (CURRENT) USE OF INSULIN: ICD-10-CM

## 2022-05-16 DIAGNOSIS — E11.9 TYPE 2 DIABETES MELLITUS WITHOUT COMPLICATIONS: ICD-10-CM

## 2022-05-16 DIAGNOSIS — I10 ESSENTIAL (PRIMARY) HYPERTENSION: ICD-10-CM

## 2022-05-16 DIAGNOSIS — Z90.89 ACQUIRED ABSENCE OF OTHER ORGANS: ICD-10-CM

## 2022-05-16 DIAGNOSIS — E66.01 MORBID (SEVERE) OBESITY DUE TO EXCESS CALORIES: ICD-10-CM

## 2022-05-16 DIAGNOSIS — N80.9 ENDOMETRIOSIS, UNSPECIFIED: ICD-10-CM

## 2022-05-16 DIAGNOSIS — Z91.013 ALLERGY TO SEAFOOD: ICD-10-CM

## 2022-05-26 ENCOUNTER — APPOINTMENT (OUTPATIENT)
Dept: CT IMAGING | Facility: CLINIC | Age: 37
End: 2022-05-26

## 2022-05-26 LAB
A-TOCOPHEROL VIT E SERPL-MCNC: 9.6 MG/L
BETA+GAMMA TOCOPHEROL SERPL-MCNC: 0.4 MG/L
VIT A SERPL-MCNC: 35.6 UG/DL
VIT B1 SERPL-MCNC: 102.9 NMOL/L

## 2022-10-12 ENCOUNTER — APPOINTMENT (OUTPATIENT)
Dept: BARIATRICS | Facility: CLINIC | Age: 37
End: 2022-10-12

## 2022-10-12 ENCOUNTER — RESULT REVIEW (OUTPATIENT)
Age: 37
End: 2022-10-12

## 2022-10-12 VITALS
HEART RATE: 82 BPM | DIASTOLIC BLOOD PRESSURE: 74 MMHG | WEIGHT: 190.13 LBS | SYSTOLIC BLOOD PRESSURE: 128 MMHG | TEMPERATURE: 97.3 F | OXYGEN SATURATION: 99 % | BODY MASS INDEX: 28.81 KG/M2 | HEIGHT: 68 IN

## 2022-10-12 DIAGNOSIS — R10.9 UNSPECIFIED ABDOMINAL PAIN: ICD-10-CM

## 2022-10-12 PROCEDURE — 99213 OFFICE O/P EST LOW 20 MIN: CPT

## 2022-10-21 ENCOUNTER — APPOINTMENT (OUTPATIENT)
Dept: CT IMAGING | Facility: CLINIC | Age: 37
End: 2022-10-21

## 2022-10-21 ENCOUNTER — OUTPATIENT (OUTPATIENT)
Dept: OUTPATIENT SERVICES | Facility: HOSPITAL | Age: 37
LOS: 1 days | End: 2022-10-21

## 2022-10-21 DIAGNOSIS — Z90.49 ACQUIRED ABSENCE OF OTHER SPECIFIED PARTS OF DIGESTIVE TRACT: Chronic | ICD-10-CM

## 2022-10-21 DIAGNOSIS — Z98.890 OTHER SPECIFIED POSTPROCEDURAL STATES: Chronic | ICD-10-CM

## 2022-10-21 PROCEDURE — 74177 CT ABD & PELVIS W/CONTRAST: CPT | Mod: 26,MH

## 2022-10-24 ENCOUNTER — NON-APPOINTMENT (OUTPATIENT)
Age: 37
End: 2022-10-24

## 2022-10-26 ENCOUNTER — APPOINTMENT (OUTPATIENT)
Dept: OBGYN | Facility: CLINIC | Age: 37
End: 2022-10-26

## 2022-10-26 ENCOUNTER — NON-APPOINTMENT (OUTPATIENT)
Age: 37
End: 2022-10-26

## 2022-10-26 VITALS — SYSTOLIC BLOOD PRESSURE: 120 MMHG | DIASTOLIC BLOOD PRESSURE: 80 MMHG

## 2022-10-26 DIAGNOSIS — Z31.9 ENCOUNTER FOR PROCREATIVE MANAGEMENT, UNSPECIFIED: ICD-10-CM

## 2022-10-26 PROCEDURE — 36415 COLL VENOUS BLD VENIPUNCTURE: CPT

## 2022-10-26 PROCEDURE — 99213 OFFICE O/P EST LOW 20 MIN: CPT

## 2022-10-26 NOTE — HISTORY OF PRESENT ILLNESS
[FreeTextEntry1] : Ms. Jerri Arredondo is a 37 year old female presenting to office for consultation regarding desire for pregnancy. \par \par Patient reports she and her partner purchased sperm from a bank and have been attempting at home IUI. They have been TTC this way for six months. \par Patient would like to have hormonal labs drawn as well as fertility referral. \par \par Ms. Arredondo has known fibroids and reports she does not wish to have them removed at this time unless they are interfering with conception. She will wait to discuss with KEYANNA. [Patient reported PAP Smear was normal] : Patient reported PAP Smear was normal [N] : Patient does not use contraception [Y] : Patient is sexually active [PapSmeardate] : 04/22 [No] : Patient does not have concerns regarding sex [Currently Active] : currently active [Women] : women

## 2022-10-27 LAB
ESTRADIOL SERPL-MCNC: 330 PG/ML
FSH SERPL-MCNC: 4.5 IU/L
HCG SERPL-MCNC: <1 MIU/ML
LH SERPL-ACNC: 5.1 IU/L
PROGEST SERPL-MCNC: 0.1 NG/ML
PROLACTIN SERPL-MCNC: 17 NG/ML
T3 SERPL-MCNC: 55 NG/DL
T3FREE SERPL-MCNC: 1.51 PG/ML
T4 FREE SERPL-MCNC: 0.9 NG/DL
T4 SERPL-MCNC: 4.9 UG/DL
TSH SERPL-ACNC: 1.79 UIU/ML

## 2022-11-01 ENCOUNTER — APPOINTMENT (OUTPATIENT)
Dept: BARIATRICS | Facility: CLINIC | Age: 37
End: 2022-11-01

## 2022-11-02 ENCOUNTER — APPOINTMENT (OUTPATIENT)
Dept: HEMATOLOGY ONCOLOGY | Facility: CLINIC | Age: 37
End: 2022-11-02

## 2022-11-02 VITALS
HEIGHT: 68 IN | DIASTOLIC BLOOD PRESSURE: 68 MMHG | WEIGHT: 187 LBS | SYSTOLIC BLOOD PRESSURE: 116 MMHG | RESPIRATION RATE: 16 BRPM | BODY MASS INDEX: 28.34 KG/M2 | TEMPERATURE: 98 F | OXYGEN SATURATION: 100 % | HEART RATE: 77 BPM

## 2022-11-02 DIAGNOSIS — D50.9 IRON DEFICIENCY ANEMIA, UNSPECIFIED: ICD-10-CM

## 2022-11-02 DIAGNOSIS — Z98.84 BARIATRIC SURGERY STATUS: ICD-10-CM

## 2022-11-02 DIAGNOSIS — E55.9 VITAMIN D DEFICIENCY, UNSPECIFIED: ICD-10-CM

## 2022-11-02 PROCEDURE — 99214 OFFICE O/P EST MOD 30 MIN: CPT | Mod: 25

## 2022-11-02 PROCEDURE — 36415 COLL VENOUS BLD VENIPUNCTURE: CPT

## 2022-11-04 LAB — ANTI-MUELLERIAN HORMONE: 0.12 NG/ML

## 2022-11-07 ENCOUNTER — APPOINTMENT (OUTPATIENT)
Dept: BARIATRICS | Facility: CLINIC | Age: 37
End: 2022-11-07

## 2022-11-07 ENCOUNTER — NON-APPOINTMENT (OUTPATIENT)
Age: 37
End: 2022-11-07

## 2022-11-14 PROBLEM — E55.9 VITAMIN D INSUFFICIENCY: Status: ACTIVE | Noted: 2020-10-26

## 2022-11-14 LAB
25(OH)D3 SERPL-MCNC: 19.1 NG/ML
BASOPHILS # BLD AUTO: 0.03 K/UL
BASOPHILS NFR BLD AUTO: 0.4 %
EOSINOPHIL # BLD AUTO: 0.09 K/UL
EOSINOPHIL NFR BLD AUTO: 1.3 %
ERYTHROCYTE [SEDIMENTATION RATE] IN BLOOD BY WESTERGREN METHOD: 9 MM/HR
FERRITIN SERPL-MCNC: 86 NG/ML
HAPTOGLOB SERPL-MCNC: 60 MG/DL
HCT VFR BLD CALC: 29.9 %
HGB BLD-MCNC: 9.8 G/DL
IMM GRANULOCYTES NFR BLD AUTO: 0.3 %
IRON SATN MFR SERPL: 19 %
IRON SERPL-MCNC: 48 UG/DL
LDH SERPL-CCNC: 191 U/L
LYMPHOCYTES # BLD AUTO: 3.18 K/UL
LYMPHOCYTES NFR BLD AUTO: 45.4 %
MAN DIFF?: NORMAL
MCHC RBC-ENTMCNC: 32.5 PG
MCHC RBC-ENTMCNC: 32.8 GM/DL
MCV RBC AUTO: 99 FL
MONOCYTES # BLD AUTO: 0.49 K/UL
MONOCYTES NFR BLD AUTO: 7 %
NEUTROPHILS # BLD AUTO: 3.19 K/UL
NEUTROPHILS NFR BLD AUTO: 45.6 %
PLATELET # BLD AUTO: 173 K/UL
RBC # BLD: 3.02 M/UL
RBC # FLD: 13.7 %
TIBC SERPL-MCNC: 249 UG/DL
UIBC SERPL-MCNC: 201 UG/DL
VIT B12 SERPL-MCNC: 483 PG/ML
WBC # FLD AUTO: 7 K/UL

## 2022-11-14 NOTE — ASSESSMENT
[FreeTextEntry1] : Repeat blood work done... Patient continues to have iron deficiency anemia and I therefore ordered intravenous iron... Of note patient also has low vitamin D so please encourage her to take her oral vitamin D.\par \par \par Follow-up here as needed

## 2022-11-14 NOTE — HISTORY OF PRESENT ILLNESS
[de-identified] : 35 years old, going for weight reduction surgery was found to have anemia and vitamin D deficiency... [de-identified] : January 14, 2021 patient is here for follow-up had IV iron x2\par \par 11/10/2021 patient states she is feeling tired... Had blood work with PCP was found to have low iron... Patient does not have blood work available.\par \par 12/15/2021 feels more tired and dizzy... Here for repeat blood work...\par \par November 2, 2022 returns for follow-up... States she is taking her vitamins "until they come out of my pores"... Worried that her serum iron is still low

## 2022-11-28 ENCOUNTER — OUTPATIENT (OUTPATIENT)
Dept: OUTPATIENT SERVICES | Facility: HOSPITAL | Age: 37
LOS: 1 days | End: 2022-11-28
Payer: MEDICARE

## 2022-11-28 ENCOUNTER — APPOINTMENT (OUTPATIENT)
Dept: INFUSION THERAPY | Facility: CLINIC | Age: 37
End: 2022-11-28

## 2022-11-28 VITALS
RESPIRATION RATE: 17 BRPM | OXYGEN SATURATION: 99 % | WEIGHT: 179.9 LBS | TEMPERATURE: 98 F | HEART RATE: 70 BPM | SYSTOLIC BLOOD PRESSURE: 112 MMHG | HEIGHT: 68 IN | DIASTOLIC BLOOD PRESSURE: 76 MMHG

## 2022-11-28 DIAGNOSIS — Z98.890 OTHER SPECIFIED POSTPROCEDURAL STATES: Chronic | ICD-10-CM

## 2022-11-28 DIAGNOSIS — Z90.49 ACQUIRED ABSENCE OF OTHER SPECIFIED PARTS OF DIGESTIVE TRACT: Chronic | ICD-10-CM

## 2022-11-28 DIAGNOSIS — D50.9 IRON DEFICIENCY ANEMIA, UNSPECIFIED: ICD-10-CM

## 2022-11-28 PROCEDURE — 96365 THER/PROPH/DIAG IV INF INIT: CPT

## 2022-11-28 RX ORDER — FERUMOXYTOL 510 MG/17ML
510 INJECTION INTRAVENOUS ONCE
Refills: 0 | Status: COMPLETED | OUTPATIENT
Start: 2022-11-28 | End: 2022-11-28

## 2022-11-28 RX ADMIN — FERUMOXYTOL 510 MILLIGRAM(S): 510 INJECTION INTRAVENOUS at 10:23

## 2022-11-28 RX ADMIN — FERUMOXYTOL 117 MILLIGRAM(S): 510 INJECTION INTRAVENOUS at 09:23

## 2022-12-02 ENCOUNTER — APPOINTMENT (OUTPATIENT)
Dept: INFUSION THERAPY | Facility: CLINIC | Age: 37
End: 2022-12-02

## 2022-12-02 ENCOUNTER — OUTPATIENT (OUTPATIENT)
Dept: OUTPATIENT SERVICES | Facility: HOSPITAL | Age: 37
LOS: 1 days | End: 2022-12-02
Payer: MEDICARE

## 2022-12-02 VITALS
OXYGEN SATURATION: 98 % | WEIGHT: 179.9 LBS | TEMPERATURE: 99 F | RESPIRATION RATE: 16 BRPM | DIASTOLIC BLOOD PRESSURE: 77 MMHG | HEIGHT: 68 IN | HEART RATE: 82 BPM | SYSTOLIC BLOOD PRESSURE: 120 MMHG

## 2022-12-02 DIAGNOSIS — D50.9 IRON DEFICIENCY ANEMIA, UNSPECIFIED: ICD-10-CM

## 2022-12-02 DIAGNOSIS — Z98.890 OTHER SPECIFIED POSTPROCEDURAL STATES: Chronic | ICD-10-CM

## 2022-12-02 DIAGNOSIS — Z90.49 ACQUIRED ABSENCE OF OTHER SPECIFIED PARTS OF DIGESTIVE TRACT: Chronic | ICD-10-CM

## 2022-12-02 PROCEDURE — 96365 THER/PROPH/DIAG IV INF INIT: CPT

## 2022-12-02 RX ORDER — FERUMOXYTOL 510 MG/17ML
510 INJECTION INTRAVENOUS ONCE
Refills: 0 | Status: COMPLETED | OUTPATIENT
Start: 2022-12-02 | End: 2022-12-02

## 2022-12-02 RX ADMIN — FERUMOXYTOL 117 MILLIGRAM(S): 510 INJECTION INTRAVENOUS at 09:35

## 2022-12-02 RX ADMIN — FERUMOXYTOL 510 MILLIGRAM(S): 510 INJECTION INTRAVENOUS at 10:35

## 2022-12-09 ENCOUNTER — NON-APPOINTMENT (OUTPATIENT)
Age: 37
End: 2022-12-09

## 2023-05-17 NOTE — ASSESSMENT
[FreeTextEntry1] : Repeat blood work patient has a ferritin of 12 and a TIBC saturation of 12, consistent with iron deficiency anemia.\par \par I placed orders for intravenous iron to be given when we obtain authorization from insurance.\par \par All this was discussed in detail with patient.\par \par \par  Internal Medicine Internal Medicine Internal Medicine

## 2024-11-26 NOTE — PATIENT PROFILE ADULT - NSPROPTRIGHTSUPPORTPERSON_GEN_A_NUR
same name as above
PRINCIPAL PROCEDURE  Procedure: Nephrostomy of left kidney  Findings and Treatment: You had a left nephrostomy tube placed by Dr. Moralez on 11/29/24 [facility]   Monitor site for any sign or symptoms of infection (painful red skin, green/ yellow foul smelling discharge from the insertion site, fever, chills, leakage around drain site).   Flush drain with 10mL NS daily. If you meet resistance upon flushing, STOP and contact IR.   Empty drainage bag or bulb daily and record output.  Drain care:  -Disconnect tubing (tube attached to bag/ bulb)  from the catheter (catheter going into skin)  -Clean catheter with alcohol swab  -Twist on the flush syringe to the catheter (be sure to push the air out of syringe)  -Flush catheter with 10mL (DO NOT pull back on syringe). If you meet resistance upon flushing, STOP and contact IR.   -Disconnect syringe from catheter and reconnect drainage bag or bulb.  Dressing care:  -Wash hands thoroughly with water and soap for at least 20 seconds.   -take off old dressing and clean around drain site with gauze soaked with warm water  -After cleaning the site, dry and replace dressing with a new gauze and tegaderm.   -Place one piece of gauze underneath the drain and another piece of gauze on top of drain.   -Apply tegaderm (clear dressing) on top.  -Change dressing every 3 days or when soiled

## 2025-01-25 ENCOUNTER — INPATIENT (INPATIENT)
Facility: HOSPITAL | Age: 40
LOS: 3 days | Discharge: HOME HEALTH | End: 2025-01-29
Attending: STUDENT IN AN ORGANIZED HEALTH CARE EDUCATION/TRAINING PROGRAM | Admitting: STUDENT IN AN ORGANIZED HEALTH CARE EDUCATION/TRAINING PROGRAM
Payer: MEDICARE

## 2025-01-25 VITALS
DIASTOLIC BLOOD PRESSURE: 85 MMHG | OXYGEN SATURATION: 100 % | HEIGHT: 68 IN | SYSTOLIC BLOOD PRESSURE: 137 MMHG | HEART RATE: 68 BPM | RESPIRATION RATE: 18 BRPM | WEIGHT: 173.06 LBS | TEMPERATURE: 98 F

## 2025-01-25 DIAGNOSIS — G62.9 POLYNEUROPATHY, UNSPECIFIED: ICD-10-CM

## 2025-01-25 DIAGNOSIS — Z98.890 OTHER SPECIFIED POSTPROCEDURAL STATES: Chronic | ICD-10-CM

## 2025-01-25 DIAGNOSIS — Z90.49 ACQUIRED ABSENCE OF OTHER SPECIFIED PARTS OF DIGESTIVE TRACT: Chronic | ICD-10-CM

## 2025-01-25 DIAGNOSIS — D64.9 ANEMIA, UNSPECIFIED: ICD-10-CM

## 2025-01-25 DIAGNOSIS — J45.909 UNSPECIFIED ASTHMA, UNCOMPLICATED: ICD-10-CM

## 2025-01-25 DIAGNOSIS — E87.20 ACIDOSIS, UNSPECIFIED: ICD-10-CM

## 2025-01-25 DIAGNOSIS — E10.9 TYPE 1 DIABETES MELLITUS WITHOUT COMPLICATIONS: ICD-10-CM

## 2025-01-25 DIAGNOSIS — E11.621 TYPE 2 DIABETES MELLITUS WITH FOOT ULCER: ICD-10-CM

## 2025-01-25 DIAGNOSIS — Z29.9 ENCOUNTER FOR PROPHYLACTIC MEASURES, UNSPECIFIED: ICD-10-CM

## 2025-01-25 LAB
ADD ON TEST-SPECIMEN IN LAB: SIGNIFICANT CHANGE UP
ALBUMIN SERPL ELPH-MCNC: 4 G/DL — SIGNIFICANT CHANGE UP (ref 3.3–5)
ALP SERPL-CCNC: 131 U/L — HIGH (ref 40–120)
ALT FLD-CCNC: 29 U/L — SIGNIFICANT CHANGE UP (ref 10–45)
ANION GAP SERPL CALC-SCNC: 8 MMOL/L — SIGNIFICANT CHANGE UP (ref 5–17)
APTT BLD: 33.8 SEC — SIGNIFICANT CHANGE UP (ref 24.5–35.6)
AST SERPL-CCNC: 21 U/L — SIGNIFICANT CHANGE UP (ref 10–40)
BASOPHILS # BLD AUTO: 0.03 K/UL — SIGNIFICANT CHANGE UP (ref 0–0.2)
BASOPHILS NFR BLD AUTO: 0.3 % — SIGNIFICANT CHANGE UP (ref 0–2)
BILIRUB SERPL-MCNC: <0.2 MG/DL — SIGNIFICANT CHANGE UP (ref 0.2–1.2)
BUN SERPL-MCNC: 12 MG/DL — SIGNIFICANT CHANGE UP (ref 7–23)
CALCIUM SERPL-MCNC: 8 MG/DL — LOW (ref 8.4–10.5)
CHLORIDE SERPL-SCNC: 112 MMOL/L — HIGH (ref 96–108)
CO2 SERPL-SCNC: 19 MMOL/L — LOW (ref 22–31)
CREAT SERPL-MCNC: 0.62 MG/DL — SIGNIFICANT CHANGE UP (ref 0.5–1.3)
CRP SERPL-MCNC: 4 MG/L — SIGNIFICANT CHANGE UP (ref 0–4)
EGFR: 115 ML/MIN/1.73M2 — SIGNIFICANT CHANGE UP
EOSINOPHIL # BLD AUTO: 0.09 K/UL — SIGNIFICANT CHANGE UP (ref 0–0.5)
EOSINOPHIL NFR BLD AUTO: 0.9 % — SIGNIFICANT CHANGE UP (ref 0–6)
ERYTHROCYTE [SEDIMENTATION RATE] IN BLOOD: 32 MM/HR — HIGH
GLUCOSE SERPL-MCNC: 127 MG/DL — HIGH (ref 70–99)
HCT VFR BLD CALC: 25.2 % — LOW (ref 34.5–45)
HGB BLD-MCNC: 8.1 G/DL — LOW (ref 11.5–15.5)
IMM GRANULOCYTES NFR BLD AUTO: 0.4 % — SIGNIFICANT CHANGE UP (ref 0–0.9)
INR BLD: 1.01 — SIGNIFICANT CHANGE UP (ref 0.85–1.16)
LACTATE SERPL-SCNC: 0.7 MMOL/L — SIGNIFICANT CHANGE UP (ref 0.5–2)
LYMPHOCYTES # BLD AUTO: 2 K/UL — SIGNIFICANT CHANGE UP (ref 1–3.3)
LYMPHOCYTES # BLD AUTO: 20.7 % — SIGNIFICANT CHANGE UP (ref 13–44)
MCHC RBC-ENTMCNC: 30.6 PG — SIGNIFICANT CHANGE UP (ref 27–34)
MCHC RBC-ENTMCNC: 32.1 G/DL — SIGNIFICANT CHANGE UP (ref 32–36)
MCV RBC AUTO: 95.1 FL — SIGNIFICANT CHANGE UP (ref 80–100)
MONOCYTES # BLD AUTO: 0.63 K/UL — SIGNIFICANT CHANGE UP (ref 0–0.9)
MONOCYTES NFR BLD AUTO: 6.5 % — SIGNIFICANT CHANGE UP (ref 2–14)
NEUTROPHILS # BLD AUTO: 6.88 K/UL — SIGNIFICANT CHANGE UP (ref 1.8–7.4)
NEUTROPHILS NFR BLD AUTO: 71.2 % — SIGNIFICANT CHANGE UP (ref 43–77)
NRBC # BLD: 0 /100 WBCS — SIGNIFICANT CHANGE UP (ref 0–0)
NRBC BLD-RTO: 0 /100 WBCS — SIGNIFICANT CHANGE UP (ref 0–0)
PLATELET # BLD AUTO: 223 K/UL — SIGNIFICANT CHANGE UP (ref 150–400)
POTASSIUM SERPL-MCNC: 4.3 MMOL/L — SIGNIFICANT CHANGE UP (ref 3.5–5.3)
POTASSIUM SERPL-SCNC: 4.3 MMOL/L — SIGNIFICANT CHANGE UP (ref 3.5–5.3)
PROT SERPL-MCNC: 6.5 G/DL — SIGNIFICANT CHANGE UP (ref 6–8.3)
PROTHROM AB SERPL-ACNC: 11.8 SEC — SIGNIFICANT CHANGE UP (ref 9.9–13.4)
RBC # BLD: 2.65 M/UL — LOW (ref 3.8–5.2)
RBC # FLD: 16.1 % — HIGH (ref 10.3–14.5)
SODIUM SERPL-SCNC: 139 MMOL/L — SIGNIFICANT CHANGE UP (ref 135–145)
WBC # BLD: 9.67 K/UL — SIGNIFICANT CHANGE UP (ref 3.8–10.5)
WBC # FLD AUTO: 9.67 K/UL — SIGNIFICANT CHANGE UP (ref 3.8–10.5)

## 2025-01-25 PROCEDURE — 99223 1ST HOSP IP/OBS HIGH 75: CPT | Mod: GC

## 2025-01-25 PROCEDURE — 99285 EMERGENCY DEPT VISIT HI MDM: CPT

## 2025-01-25 PROCEDURE — 73660 X-RAY EXAM OF TOE(S): CPT | Mod: 26,RT

## 2025-01-25 PROCEDURE — 93010 ELECTROCARDIOGRAM REPORT: CPT

## 2025-01-25 RX ORDER — DM/PSEUDOEPHED/ACETAMINOPHEN 10-30-250
25 CAPSULE ORAL ONCE
Refills: 0 | Status: DISCONTINUED | OUTPATIENT
Start: 2025-01-25 | End: 2025-01-29

## 2025-01-25 RX ORDER — DM/PSEUDOEPHED/ACETAMINOPHEN 10-30-250
15 CAPSULE ORAL ONCE
Refills: 0 | Status: DISCONTINUED | OUTPATIENT
Start: 2025-01-25 | End: 2025-01-29

## 2025-01-25 RX ORDER — SODIUM CHLORIDE 9 G/ML
1000 INJECTION, SOLUTION INTRAVENOUS
Refills: 0 | Status: DISCONTINUED | OUTPATIENT
Start: 2025-01-25 | End: 2025-01-29

## 2025-01-25 RX ORDER — GLUCAGON 3 MG/1
1 POWDER NASAL ONCE
Refills: 0 | Status: DISCONTINUED | OUTPATIENT
Start: 2025-01-25 | End: 2025-01-29

## 2025-01-25 RX ORDER — ALBUTEROL 90 MCG
2.5 AEROSOL REFILL (GRAM) INHALATION EVERY 6 HOURS
Refills: 0 | Status: DISCONTINUED | OUTPATIENT
Start: 2025-01-25 | End: 2025-01-29

## 2025-01-25 RX ORDER — PIPERACILLIN SODIUM AND TAZOBACTAM SODIUM 2; 250 G/50ML; MG/50ML
4.5 INJECTION, POWDER, FOR SOLUTION INTRAVENOUS ONCE
Refills: 0 | Status: COMPLETED | OUTPATIENT
Start: 2025-01-25 | End: 2025-01-25

## 2025-01-25 RX ORDER — VANCOMYCIN HYDROCHLORIDE 50 MG/ML
1250 KIT ORAL ONCE
Refills: 0 | Status: DISCONTINUED | OUTPATIENT
Start: 2025-01-25 | End: 2025-01-25

## 2025-01-25 RX ORDER — ALBUTEROL 90 MCG
1 AEROSOL REFILL (GRAM) INHALATION EVERY 4 HOURS
Refills: 0 | Status: DISCONTINUED | OUTPATIENT
Start: 2025-01-25 | End: 2025-01-29

## 2025-01-25 RX ORDER — DM/PSEUDOEPHED/ACETAMINOPHEN 10-30-250
12.5 CAPSULE ORAL ONCE
Refills: 0 | Status: DISCONTINUED | OUTPATIENT
Start: 2025-01-25 | End: 2025-01-29

## 2025-01-25 RX ORDER — INSULIN LISPRO 100/ML
VIAL (ML) SUBCUTANEOUS
Refills: 0 | Status: DISCONTINUED | OUTPATIENT
Start: 2025-01-25 | End: 2025-01-29

## 2025-01-25 RX ORDER — GABAPENTIN 800 MG/1
100 TABLET ORAL EVERY 8 HOURS
Refills: 0 | Status: DISCONTINUED | OUTPATIENT
Start: 2025-01-25 | End: 2025-01-29

## 2025-01-25 RX ORDER — ENOXAPARIN SODIUM 100 MG/ML
40 INJECTION SUBCUTANEOUS EVERY 24 HOURS
Refills: 0 | Status: DISCONTINUED | OUTPATIENT
Start: 2025-01-25 | End: 2025-01-29

## 2025-01-25 RX ORDER — ACETAMINOPHEN 160 MG/5ML
650 SUSPENSION ORAL EVERY 6 HOURS
Refills: 0 | Status: DISCONTINUED | OUTPATIENT
Start: 2025-01-25 | End: 2025-01-26

## 2025-01-25 RX ORDER — PIPERACILLIN SODIUM AND TAZOBACTAM SODIUM 2; 250 G/50ML; MG/50ML
3.38 INJECTION, POWDER, FOR SOLUTION INTRAVENOUS ONCE
Refills: 0 | Status: DISCONTINUED | OUTPATIENT
Start: 2025-01-25 | End: 2025-01-25

## 2025-01-25 RX ORDER — VANCOMYCIN HYDROCHLORIDE 50 MG/ML
1250 KIT ORAL ONCE
Refills: 0 | Status: COMPLETED | OUTPATIENT
Start: 2025-01-25 | End: 2025-01-25

## 2025-01-25 RX ORDER — BACTERIOSTATIC SODIUM CHLORIDE 0.9 %
1000 VIAL (ML) INJECTION ONCE
Refills: 0 | Status: COMPLETED | OUTPATIENT
Start: 2025-01-25 | End: 2025-01-25

## 2025-01-25 RX ADMIN — ACETAMINOPHEN 650 MILLIGRAM(S): 160 SUSPENSION ORAL at 18:24

## 2025-01-25 RX ADMIN — Medication 1000 MILLILITER(S): at 13:07

## 2025-01-25 RX ADMIN — PIPERACILLIN SODIUM AND TAZOBACTAM SODIUM 200 GRAM(S): 2; 250 INJECTION, POWDER, FOR SOLUTION INTRAVENOUS at 13:07

## 2025-01-25 RX ADMIN — Medication 1: at 22:31

## 2025-01-25 RX ADMIN — GABAPENTIN 100 MILLIGRAM(S): 800 TABLET ORAL at 18:51

## 2025-01-25 RX ADMIN — Medication: at 18:26

## 2025-01-25 RX ADMIN — VANCOMYCIN HYDROCHLORIDE 166.67 MILLIGRAM(S): KIT at 14:28

## 2025-01-25 NOTE — ED PROVIDER NOTE - CLINICAL SUMMARY MEDICAL DECISION MAKING FREE TEXT BOX
38 yo F PMH DM with cellulitis and abscess to right great toe pad.  Will send labs, cultures, obtain xray.  Podiatry called and will see the patient when studies complete. 38 yo F PMH DM with cellulitis and abscess to right great toe pad.  Will send labs, cultures, obtain xray.  Podiatry called and will see the patient when studies complete.    15:20:  Podiatry recommends admission as wound probes to bone.  Case d/w LETICIA.

## 2025-01-25 NOTE — ED ADULT NURSE NOTE - NSFALLUNIVINTERV_ED_ALL_ED
Bed/Stretcher in lowest position, wheels locked, appropriate side rails in place/Call bell, personal items and telephone in reach/Instruct patient to call for assistance before getting out of bed/chair/stretcher/Non-slip footwear applied when patient is off stretcher/[place] to call system/Physically safe environment - no spills, clutter or unnecessary equipment/Purposeful proactive rounding/Room/bathroom lighting operational, light cord in reach

## 2025-01-25 NOTE — PATIENT PROFILE ADULT - FALL HARM RISK - HARM RISK INTERVENTIONS
Assistance with ambulation/Assistance OOB with selected safe patient handling equipment/Communicate Risk of Fall with Harm to all staff/Discuss with provider need for PT consult/Monitor gait and stability/Reinforce activity limits and safety measures with patient and family/Tailored Fall Risk Interventions/Visual Cue: Yellow wristband and red socks/Bed in lowest position, wheels locked, appropriate side rails in place/Call bell, personal items and telephone in reach/Instruct patient to call for assistance before getting out of bed or chair/Non-slip footwear when patient is out of bed/[place] to call system/Physically safe environment - no spills, clutter or unnecessary equipment/Purposeful Proactive Rounding/Room/bathroom lighting operational, light cord in reach

## 2025-01-25 NOTE — H&P ADULT - PROBLEM SELECTOR PLAN 5
F s/p 1L NS  E: Replete PRN  N: Diet, consistent carbs   DVT ppx: VTE risk assessment score of 0   GI ppx: not indicated  Bowel: not indicated  Dispo: RMF    FULL CODE likely ISO GI losses from chronic diarrhea s/p duodenal switch   at one point patient was using pancrelipase however is no longer using

## 2025-01-25 NOTE — H&P ADULT - HISTORY OF PRESENT ILLNESS
This is a 39 y.o female w/ PMH IDDM and asthma who presents complaining of  3 month history of an ulcer on her R hallux which has become progressively more purulent in the last few weeks. Per patient, she has neuropathy as a result of her diabetes and around 3 months ago she hit her toe on something and developed an ulcer which never healed.     Per patient, she was previously on insulin therapy for her DM but discontinued it after RA duodenal switch with Dr Woods in February 2021. She has been measuring her FGS at home and is normally in the 80s, but in the last few days her glucose has been elevated anywhere between 200-300.     ED Course-  VS:  98.3 F oral  | 137/85 mm Hg  | 68 HR /min  |  RR 18 /min  |   100% room air  Labs:  CBC Hb 8.1  -   |  CMP - HCO3 19, , CRP (-), ESR 32   Imaging: none  EKG:   Consults: Podiatry  Interventions:   - zosyn x1, vanc x1, 1L NS bolus  This is a 39 y.o female w/ PMH IDDM and asthma who presents complaining of  3 month history of an ulcer on her R hallux which has become progressively more purulent in the last few weeks. Per patient, she has neuropathy as a result of her diabetes and around 3 months ago she hit her toe on something and developed an ulcer which never healed.     Per patient, she was previously on insulin therapy for her DM but discontinued it after RA duodenal switch with Dr Woods in February 2021. She has been measuring her FGS at home and is normally in the 80s, but in the last few days her glucose has been elevated anywhere between 200-300.     ED Course-  VS:  98.3 F oral  | 137/85 mm Hg  | 68 HR /min  |  RR 18 /min  |   100% room air  Labs:  CBC Hb 8.1  -   |  CMP - HCO3 19, , CRP (-), ESR 32   Imaging: XR foot  EKG:   Consults: Podiatry, (+) PTB  Interventions:   - zosyn x1, vanc x1, 1L NS bolus  This is a 39 y.o female w/ PMH IDDM cb L toe osteomyelitis s/p amputation, chronic diarrhea, and asthma who presents complaining of  3 month history of an ulcer on her R hallux which has become progressively more purulent in the last few weeks. Per patient, she has neuropathy as a result of her diabetes and around 3 months ago she hit her toe on something and developed an ulcer which never healed.     Per patient, she was previously on insulin therapy for her DM but discontinued it after RA duodenal switch with Dr Woods in February 2021. She has been measuring her FGS at home and is normally in the 80s, but in the last few days her glucose has been elevated anywhere between 200-300.     ED Course-  VS:  98.3 F oral  | 137/85 mm Hg  | 68 HR /min  |  RR 18 /min  |   100% room air  Labs:  CBC Hb 8.1  -   |  CMP - HCO3 19, , CRP (-), ESR 32   Imaging: XR foot  EKG:   Consults: Podiatry, (+) PTB  Interventions:   - zosyn x1, vanc x1, 1L NS bolus  This is a 39 y.o female w/ PMH IDDM cb L toe osteomyelitis s/p amputation, chronic diarrhea, and asthma who presents complaining of  3 month history of an ulcer on her R hallux which has become progressively more purulent in the last few weeks. Per patient, she has neuropathy as a result of her diabetes and around 3 months ago she hit her toe on something and developed an ulcer which never healed. She presented to [facility] 1[facility] ago where she received 1 dose of IV abx and had foot XR showing no osteomyelitis, was discharged with plans to follow up with podiatry but never got a chance to.     Per patient, she was previously on insulin therapy for her DM but discontinued it after RA duodenal switch with Dr Woods in February 2021. She has been measuring her FGS at home and is normally in the 80s, but in the last few days her glucose has been elevated anywhere between 200-300.     ED Course-  VS:  98.3 F oral  | 137/85 mm Hg  | 68 HR /min  |  RR 18 /min  |   100% room air  Labs:  CBC Hb 8.1  -   |  CMP - HCO3 19, , CRP (-), ESR 32   Imaging: XR foot  EKG:   Consults: Podiatry, (+) PTB  Interventions:   - zosyn x1, vanc x1, 1L NS bolus

## 2025-01-25 NOTE — CONSULT NOTE ADULT - ASSESSMENT
38 yo F PMH DM reporting worsening ulcer to the right great toe in the toe pad. Podiatry consulted for evaluation of wound. At the time of consult, VSS except hypertensive, No leukocytosis, CRP wnl, ESR elevated to 32. Clinically, wound probes to bone with purulence. High concern of OM. Pending further imaging    Plan:  -F/u final X-ray read  -Recommend Admit to Medicine  -Rec Broad spectrum IV abx  -Rec R foot MR w/ contrast to r/o OM  -foot dressed with betadine DSD  -WBAT  -rest of care upto primary team    Podiatry to follow, plan d/w attending     38 yo F PMH DM reporting worsening ulcer to the right great toe in the toe pad. Podiatry consulted for evaluation of wound. At the time of consult, VSS except hypertensive, No leukocytosis, CRP wnl, ESR elevated to 32. Clinically, wound probes to bone with purulence. High concern of OM. Pending further imaging    Plan:  -F/u final X-ray read  -Recommend Admit to Medicine  -Plan for Bone Biopsy 1/25  -After the dose of IV Abx in ED, please hold the IV abx until after Bone Biopsy 1/25  -Rec R foot MR w/ contrast   -foot dressed with betadine DSD  -WBAT  -rest of care upto primary team    Podiatry to follow, plan d/w attending     40 yo F PMH DM reporting worsening ulcer to the right great toe in the toe pad. Podiatry consulted for evaluation of wound. At the time of consult, VSS except hypertensive, No leukocytosis, CRP wnl, ESR elevated to 32. Clinically, wound probes to bone with purulence. High concern of OM. Pending further imaging    Plan:  -F/u final X-ray read  -Recommend Admit to Medicine  -Plan for Bone Biopsy 1/26  -After the dose of IV Abx in ED, please hold the IV abx until after Bone Biopsy 1/26  -Rec R foot MR w/ contrast   -foot dressed with betadine DSD  -WBAT  -rest of care upto primary team    Podiatry to follow, plan d/w attending

## 2025-01-25 NOTE — H&P ADULT - PROBLEM SELECTOR PLAN 6
Diagnosed w/ asthma since childhood, has a history of intubation as well as t/w steroids   No wheezing on exam, not in acute exacerbation   Home meds: albuterol PRN     - c/w albuterol PRN

## 2025-01-25 NOTE — ED ADULT NURSE NOTE - OBJECTIVE STATEMENT
39yoF came to ED c/o R first toe pain. Pt states 39yoF PMH diabetics came to ED c/o R first toe pain. Pt states she injured her toe three months ago, went to [facility] a month ago where they gave her one round of Iv antibiotics and an XRAY. Pt states since then the pain has gotten worse, and her toe began oozing and feels warm to touch. Pt as been taking extra strength Tylenol for pain, last dose last night. Pt denies fevers, chest pain, SOB.

## 2025-01-25 NOTE — H&P ADULT - PROBLEM SELECTOR PLAN 1
p/w diabetic foot ulcer w/ (+) PTB testing in ED per podiatry    R foot XR wet read negative for OM per podiatry, no gas tracking  Low concern for cellulitis of overlying soft tissue, no zara erythema or swelling     - Podiatry consulted, appreciate recs   - plan for bone biopsy 1/25   - MR right foot ordered and pregnancy test ordered as well   - deferring t/w ABx in order to increase bone biopsy yield   - f/u formal XR read p/w diabetic foot ulcer w/ (+) PTB testing in ED per podiatry    R foot XR wet read negative for OM per podiatry, no gas tracking  Low concern for cellulitis of overlying soft tissue, no zara erythema or swelling     - Podiatry consulted, appreciate recs   - plan for bone biopsy 1/26 at 1 pm   - will initiate broad spectrum Abx with vanc and psA zosyn after biopsy is completed   - ID consult monday AM for prolonged IV abx course   - MR right foot ordered and pregnancy test ordered as well   - deferring t/w ABx in order to increase bone biopsy yield   - f/u formal XR read

## 2025-01-25 NOTE — H&P ADULT - NSHPLABSRESULTS_GEN_ALL_CORE
LABS:                         8.1    9.67  )-----------( 223      ( 25 Jan 2025 12:36 )             25.2     01-25    139  |  112[H]  |  12  ----------------------------<  127[H]  4.3   |  19[L]  |  0.62    Ca    8.0[L]      25 Jan 2025 12:36    TPro  6.5  /  Alb  4.0  /  TBili  <0.2  /  DBili  x   /  AST  21  /  ALT  29  /  AlkPhos  131[H]  01-25    PT/INR - ( 25 Jan 2025 12:36 )   PT: 11.8 sec;   INR: 1.01          PTT - ( 25 Jan 2025 12:36 )  PTT:33.8 sec  Urinalysis Basic - ( 25 Jan 2025 12:36 )    Color: x / Appearance: x / SG: x / pH: x  Gluc: 127 mg/dL / Ketone: x  / Bili: x / Urobili: x   Blood: x / Protein: x / Nitrite: x   Leuk Esterase: x / RBC: x / WBC x   Sq Epi: x / Non Sq Epi: x / Bacteria: x        Lactate, Blood: 0.7 mmol/L (01-25 @ 12:36)      RADIOLOGY, EKG & ADDITIONAL TESTS: Reviewed.

## 2025-01-25 NOTE — H&P ADULT - TIME BILLING
Time-based billing (NON-critical care).   75 minutes spent on total encounter. The necessity of the time spent during the encounter on this date of service was due to:    Bedside exam and interview   Reviewed vitals, labs   Discussed patient's plan of care with housestaff   Documentation of encounter  Excludes teaching and separately reported services

## 2025-01-25 NOTE — H&P ADULT - NSHPPHYSICALEXAM_GEN_ALL_CORE
T(C): 37.2 (01-25-25 @ 13:53), Max: 37.2 (01-25-25 @ 13:53)  HR: 65 (01-25-25 @ 13:53) (65 - 68)  BP: 152/83 (01-25-25 @ 13:53) (137/85 - 152/83)  RR: 18 (01-25-25 @ 13:53) (18 - 18)  SpO2: 98% (01-25-25 @ 13:53) (98% - 100%)    General: NAD, awake and alert, cooperative to exam,   HEENT: No conjunctival injection, EOMI, MMM  Neck: supple, no JVD  Cardio: Euvolemic on exam, Warm and well perfused in bilateral upper and lower extremities, heart is with RRR, no murmurs auscultated  Resp: symmetrical bilateral chest rises, not in respiratory distress or using accessory muscles, no cough, no crackles or wheezes auscultated.  Abdo: soft, NT/ND, +bowel sounds x4, no organomegaly or palpable mass    Vasc: 2+ radial and DP pulses b/l  Neuro: A&Ox3, no focal deficits  MSK: no joint swelling, full ROM; R hallux ulcer w/ exposed granulation tissue and purulent oozing

## 2025-01-25 NOTE — H&P ADULT - PROBLEM SELECTOR PLAN 4
Diagnosed w/ asthma since childhood, has a history of intubation as well as t/w steroids   No wheezing on exam, not in acute exacerbation   Home meds: albuterol PRN     - c/w albuterol PRN no signs or stigmata of bleeding. asymptomatic at this time    - f/u AM iron studies

## 2025-01-25 NOTE — H&P ADULT - PROBLEM SELECTOR PLAN 7
F s/p 1L NS  E: Replete PRN  N: Diet, consistent carbs   DVT ppx: VTE risk assessment score of 0   GI ppx: not indicated  Bowel: not indicated  Dispo: RMF    FULL CODE

## 2025-01-25 NOTE — H&P ADULT - ASSESSMENT
This is a 38yo F PMH of IDDM and asthma p/w acute purulence of chronic R hallux wound FTH PTB (+) admitted to [facility] for workup of osteomyelitis.

## 2025-01-25 NOTE — ED PROVIDER NOTE - OBJECTIVE STATEMENT
38 yo F PMH DM reporting worsening ulcer to the right great toe in the toe pad.  Patient states it first started approximately 3 months ago.  She went to [facility] and had an xray told to just keep it clean and to see podiatry.  Patient has been traveling and has been unable to see Podiatry but has kept the area clean and dry.  3 days ago, she started to have fever and the area became more swollen.  A large amount of pus drained from it yesterday, but there is still swelling, redness and drainage. Glucose has also been higher than is usual for her--200s-300s.

## 2025-01-25 NOTE — H&P ADULT - ATTENDING COMMENTS
40 y/o F w/ PMH IDDM cb L toe osteomyelitis s/p amputation, chronic diarrhea after bariatric surgery, and asthma who presents complaining of  3 month history of an ulcer on her R hallux which has become progressively more purulent in the last few days. ROS + for nausea, chills, subjective fever. Afebrile, not tachycardic, and normotensive. R halux with small ulcer that expresses purulence with swelling and tenderness to palpation around the toe and medial metatarsal. No crepitus. Labs significant for hgb 8.1 but no leukocytosis. F/s 236, Cl 112, and bicarb 19, .    #Diabetic foot ulcer  #R/o osteomyelitis  Purulent diabetic foot ulcer that probes to bone, suspect osteomyelitis. No sepsis. Received IV vancomycin and zosyn in the ED. Plan for bone bx with podiatry tomorrow at 1pm. Given that there is no fever, tachycardia, or leukocytosis, will hold off on further abx until bone biopsy. Will order MR foot w contrast as well.     #Anemia  Normocytic anemia of 8.1 unknown baseline though she had hgb 9.6 in 2021. Suspect anemia of chronic disease and DELVIN, gets iron transfusions as an outpatient. PO iron has not worked for her in the past. No signs of bleeding. Will trend hemoglobin and keep active t+s. no iron transfusions given active infection.    #NAGMA  Suspect 2/2 chronic diarrhea. Will ctm for now    #DM  No meds as outpatient since her blood sugar is usually controlled per patient. Only over the past few days has f/s been 200-300s, possibly in setting of infection. ISS with plan to start insulin regimen inpatient if needed and will f/u a1c

## 2025-01-25 NOTE — ED ADULT NURSE NOTE - NS ED NOTE ABUSE SUSPICION NEGLECT YN
Subjective   Patient ID: Derrick is a 29 year old female.    Chief Complaint   Patient presents with   • Annual Exam     HPI   NP presents today for CPE  Screenings:  Cervical Pap Smear; per pt last year; agreeable to repeat today  CBE due  LMP: monthly; per patient regular    IZs:  Flu shot refused  Tdap up to date    Diet: Vegetarian; does eat eggs  Exercise: sedentary  Water intake: 2-3 glasses per day  Caffeine intake: denies intake    Soc Hx:   with child  Works for [employer] FT    Derrick has no past medical history of No known problems.  Derrick has GERD (gastroesophageal reflux disease) and Periumbilical hernia on their problem list.  Derrick has a past surgical history that includes  section, low transverse.  Her family history includes Patient is unaware of any medical problems in her father, mother, and sister.  Derrick reports that she has never smoked. She has never used smokeless tobacco. She reports that she does not drink alcohol or use drugs.  Derrick currently has no medications in their medication list.  Derrick   No current outpatient medications on file.     No current facility-administered medications for this visit.      Derrick has No Known Allergies.    Review of Systems   All other systems reviewed and are negative.      Objective   Physical Exam  Vitals signs and nursing note reviewed.   Constitutional:       Appearance: Normal appearance.   HENT:      Right Ear: Tympanic membrane, ear canal and external ear normal.      Left Ear: Tympanic membrane, ear canal and external ear normal.      Nose: Nose normal.      Mouth/Throat:      Mouth: Mucous membranes are moist.      Pharynx: Oropharynx is clear.   Eyes:      Extraocular Movements: Extraocular movements intact.      Conjunctiva/sclera: Conjunctivae normal.      Pupils: Pupils are equal, round, and reactive to light.   Neck:      Musculoskeletal: Normal range of  motion and neck supple.   Cardiovascular:      Rate and Rhythm: Normal rate and regular rhythm.      Pulses: Normal pulses.      Heart sounds: Normal heart sounds.   Pulmonary:      Effort: Pulmonary effort is normal.      Breath sounds: Normal breath sounds.   Chest:      Breasts: Breasts are symmetrical.         Right: Normal. No swelling, bleeding, inverted nipple, mass, nipple discharge, skin change or tenderness.         Left: Normal. No swelling, bleeding, inverted nipple, mass, nipple discharge, skin change or tenderness.   Abdominal:      General: Bowel sounds are normal.      Palpations: Abdomen is soft.   Genitourinary:     Vagina: Normal.      Cervix: Normal.      Uterus: Normal.       Adnexa: Right adnexa normal and left adnexa normal.        Right: No mass, tenderness or fullness.          Left: No mass, tenderness or fullness.     Musculoskeletal: Normal range of motion.   Skin:     General: Skin is warm and dry.      Capillary Refill: Capillary refill takes less than 2 seconds.   Neurological:      Mental Status: She is alert and oriented to person, place, and time. Mental status is at baseline.   Psychiatric:         Mood and Affect: Mood normal.         Behavior: Behavior normal.         Patient Active Problem List   Diagnosis   • GERD (gastroesophageal reflux disease)   • Periumbilical hernia       Assessment    Routine health maintenance  (primary encounter diagnosis)  Plan:   CBC WITH DIFFERENTIAL, COMPREHENSIVE METABOLIC    PANEL, LIPID PANEL WITH REFLEX, URINALYSIS &   REFLEX MICRO WITH CULTURE IF INDICATED, THYROID  STIMULATING HORMONE REFLEX   Discussed health maintenance, including regular aerobic exercise, low fat diet, and periodic exams.      Cervical cancer screening  Plan:   THINPREP PAP TEST WITH HPV REGARDLESS        Vegetarian diet  Plan:   VITAMIN D -25 HYDROXY,   VITAMIN B12 AND FOLATE         Schedule follow up: in a year, at next annual exam   No

## 2025-01-25 NOTE — ED PROVIDER NOTE - PHYSICAL EXAMINATION
General:  Well appearing, no distress  HEENT:  No conjunctival injection, neck supple, no congestion   Chest:  Non-tender, no crepitance  Lungs:  Clear to auscultation bilaterally   Heart:  s1s2 normal, no murmur  Abdomen:  soft, non-tender, non-distended  :  Deferred  Rectal:  Deferred  Extremities: Right great toe with erythema, mild swelling and fluctuance to the toe pad.  There is a slit-like wound with a small amount of thin purulent drainage.  No other swelling or erythema.  No lymphangitis  Neuro:  Alert, conversant, motor/sensory grossly intact

## 2025-01-25 NOTE — ED PROVIDER NOTE - MDM ORDERS SUBMITTED SELECTION
Dysmenorrhea   WHAT YOU NEED TO KNOW:   Dysmenorrhea is painful menstrual cramps at or around the time of your monthly period  DISCHARGE INSTRUCTIONS:   Medicines: You may need any of the following:  · NSAIDs  help decrease swelling, pain, and fever  This medicine is available with or without a doctor's order  NSAIDs can cause stomach bleeding or kidney problems in certain people  If you take blood thinner medicine, always ask your healthcare provider if NSAIDs are safe for you  Always read the medicine label and follow directions  · Birth control medicine  may help decrease your pain  This medicine may be birth control pills or an IUD that does not contain copper  · Take your medicine as directed  Contact your healthcare provider if you think your medicine is not helping or if you have side effects  Tell him or her if you are allergic to any medicine  Keep a list of the medicines, vitamins, and herbs you take  Include the amounts, and when and why you take them  Bring the list or the pill bottles to follow-up visits  Carry your medicine list with you in case of an emergency  Eat low-fat foods: Increase the amount of vegetables and raw seeds you eat  Ask your healthcare provider if you should take vitamin B or magnesium supplements  These will help decrease your pain  Do not eat dairy products or eggs  Apply heat:  Apply heat on your lower abdomen for 20 to 30 minutes every 2 hours for as many days as directed  Heat helps decrease pain and muscle spasms  Manage your stress:  Stress can make your symptoms worse  Try relaxation exercises, such as deep breathing  Exercise regularly:  Ask your healthcare provider about the best exercise plan for you  Exercise can help decrease pain  Keep a record of your pain:  Write down when your pain and periods start and stop  Bring the record with you to your follow-up visits  Do not smoke:  Avoid others who smoke  If you smoke, it is never too late to quit  Smoking can increase your risk for dysmenorrhea  Ask your healthcare provider for information if you need help quitting  Follow up with your healthcare provider or OB-GYN as directed:  Write down your questions so you remember to ask them during your visits  Contact your healthcare provider or OB-GYN if:   · You have anxiety or feel depressed  · Your periods are early, late, or more painful than usual     · You have questions or concerns about your condition or care  Return to the emergency department if:   · You have severe pain  · You have heavy vaginal bleeding and you feel faint  · You have sudden chest pain and trouble breathing  © Copyright [employer] 2021 Information is for End User's use only and may not be sold, redistributed or otherwise used for commercial purposes  All illustrations and images included in CareNotes® are the copyrighted property of A D A M , Inc  or Brigido Nye  The above information is an  only  It is not intended as medical advice for individual conditions or treatments  Talk to your doctor, nurse or pharmacist before following any medical regimen to see if it is safe and effective for you  Imaging Studies/Medications

## 2025-01-25 NOTE — ED ADULT TRIAGE NOTE - SOURCE OF INFORMATION
Patient Sotyktu Pregnancy And Lactation Text: There is insufficient data to evaluate whether or not Sotyktu is safe to use during pregnancy.   It is not known if Sotyktu passes into breast milk and whether or not it is safe to use when breastfeeding.

## 2025-01-26 DIAGNOSIS — E11.9 TYPE 2 DIABETES MELLITUS WITHOUT COMPLICATIONS: ICD-10-CM

## 2025-01-26 LAB
A1C WITH ESTIMATED AVERAGE GLUCOSE RESULT: 6.4 % — HIGH (ref 4–5.6)
ANION GAP SERPL CALC-SCNC: 9 MMOL/L — SIGNIFICANT CHANGE UP (ref 5–17)
BASOPHILS # BLD AUTO: 0.04 K/UL — SIGNIFICANT CHANGE UP (ref 0–0.2)
BASOPHILS NFR BLD AUTO: 0.6 % — SIGNIFICANT CHANGE UP (ref 0–2)
BUN SERPL-MCNC: 14 MG/DL — SIGNIFICANT CHANGE UP (ref 7–23)
CALCIUM SERPL-MCNC: 8.3 MG/DL — LOW (ref 8.4–10.5)
CHLORIDE SERPL-SCNC: 112 MMOL/L — HIGH (ref 96–108)
CO2 SERPL-SCNC: 18 MMOL/L — LOW (ref 22–31)
CREAT SERPL-MCNC: 0.54 MG/DL — SIGNIFICANT CHANGE UP (ref 0.5–1.3)
EGFR: 119 ML/MIN/1.73M2 — SIGNIFICANT CHANGE UP
EOSINOPHIL # BLD AUTO: 0.09 K/UL — SIGNIFICANT CHANGE UP (ref 0–0.5)
EOSINOPHIL NFR BLD AUTO: 1.3 % — SIGNIFICANT CHANGE UP (ref 0–6)
ESTIMATED AVERAGE GLUCOSE: 137 MG/DL — HIGH (ref 68–114)
GGT SERPL-CCNC: 24 U/L — SIGNIFICANT CHANGE UP (ref 8–40)
GLUCOSE SERPL-MCNC: 121 MG/DL — HIGH (ref 70–99)
HCT VFR BLD CALC: 26.1 % — LOW (ref 34.5–45)
HGB BLD-MCNC: 7.9 G/DL — LOW (ref 11.5–15.5)
IMM GRANULOCYTES NFR BLD AUTO: 0.1 % — SIGNIFICANT CHANGE UP (ref 0–0.9)
LYMPHOCYTES # BLD AUTO: 2.08 K/UL — SIGNIFICANT CHANGE UP (ref 1–3.3)
LYMPHOCYTES # BLD AUTO: 29.5 % — SIGNIFICANT CHANGE UP (ref 13–44)
MAGNESIUM SERPL-MCNC: 1.9 MG/DL — SIGNIFICANT CHANGE UP (ref 1.6–2.6)
MCHC RBC-ENTMCNC: 28.9 PG — SIGNIFICANT CHANGE UP (ref 27–34)
MCHC RBC-ENTMCNC: 30.3 G/DL — LOW (ref 32–36)
MCV RBC AUTO: 95.6 FL — SIGNIFICANT CHANGE UP (ref 80–100)
MONOCYTES # BLD AUTO: 0.54 K/UL — SIGNIFICANT CHANGE UP (ref 0–0.9)
MONOCYTES NFR BLD AUTO: 7.7 % — SIGNIFICANT CHANGE UP (ref 2–14)
NEUTROPHILS # BLD AUTO: 4.29 K/UL — SIGNIFICANT CHANGE UP (ref 1.8–7.4)
NEUTROPHILS NFR BLD AUTO: 60.8 % — SIGNIFICANT CHANGE UP (ref 43–77)
NRBC # BLD: 0 /100 WBCS — SIGNIFICANT CHANGE UP (ref 0–0)
NRBC BLD-RTO: 0 /100 WBCS — SIGNIFICANT CHANGE UP (ref 0–0)
PHOSPHATE SERPL-MCNC: 2.5 MG/DL — SIGNIFICANT CHANGE UP (ref 2.5–4.5)
PLATELET # BLD AUTO: 220 K/UL — SIGNIFICANT CHANGE UP (ref 150–400)
POTASSIUM SERPL-MCNC: 4 MMOL/L — SIGNIFICANT CHANGE UP (ref 3.5–5.3)
POTASSIUM SERPL-SCNC: 4 MMOL/L — SIGNIFICANT CHANGE UP (ref 3.5–5.3)
RBC # BLD: 2.73 M/UL — LOW (ref 3.8–5.2)
RBC # FLD: 15.9 % — HIGH (ref 10.3–14.5)
SODIUM SERPL-SCNC: 139 MMOL/L — SIGNIFICANT CHANGE UP (ref 135–145)
WBC # BLD: 7.05 K/UL — SIGNIFICANT CHANGE UP (ref 3.8–10.5)
WBC # FLD AUTO: 7.05 K/UL — SIGNIFICANT CHANGE UP (ref 3.8–10.5)

## 2025-01-26 PROCEDURE — 88311 DECALCIFY TISSUE: CPT | Mod: 26

## 2025-01-26 PROCEDURE — 88307 TISSUE EXAM BY PATHOLOGIST: CPT | Mod: 26

## 2025-01-26 PROCEDURE — 99233 SBSQ HOSP IP/OBS HIGH 50: CPT | Mod: GC

## 2025-01-26 PROCEDURE — 73720 MRI LWR EXTREMITY W/O&W/DYE: CPT | Mod: 26,RT

## 2025-01-26 RX ORDER — LIDOCAINE HYDROCHLORIDE 10 MG/ML
20 INJECTION EPIDURAL; INFILTRATION; INTRACAUDAL ONCE
Refills: 0 | Status: COMPLETED | OUTPATIENT
Start: 2025-01-26 | End: 2025-01-26

## 2025-01-26 RX ORDER — IBUPROFEN 600 MG/1
400 TABLET, FILM COATED ORAL ONCE
Refills: 0 | Status: COMPLETED | OUTPATIENT
Start: 2025-01-26 | End: 2025-01-26

## 2025-01-26 RX ORDER — PIPERACILLIN SODIUM AND TAZOBACTAM SODIUM 2; 250 G/50ML; MG/50ML
4.5 INJECTION, POWDER, FOR SOLUTION INTRAVENOUS EVERY 8 HOURS
Refills: 0 | Status: DISCONTINUED | OUTPATIENT
Start: 2025-01-26 | End: 2025-01-28

## 2025-01-26 RX ORDER — OXYCODONE HYDROCHLORIDE 30 MG/1
2.5 TABLET ORAL EVERY 6 HOURS
Refills: 0 | Status: DISCONTINUED | OUTPATIENT
Start: 2025-01-26 | End: 2025-01-29

## 2025-01-26 RX ORDER — MAGNESIUM SULFATE 0.8 MEQ/ML
1 AMPUL (ML) INJECTION ONCE
Refills: 0 | Status: COMPLETED | OUTPATIENT
Start: 2025-01-26 | End: 2025-01-26

## 2025-01-26 RX ORDER — LIDOCAINE HYDROCHLORIDE 30 MG/G
1 CREAM TOPICAL EVERY 24 HOURS
Refills: 0 | Status: DISCONTINUED | OUTPATIENT
Start: 2025-01-26 | End: 2025-01-29

## 2025-01-26 RX ORDER — VANCOMYCIN HYDROCHLORIDE 50 MG/ML
1250 KIT ORAL EVERY 12 HOURS
Refills: 0 | Status: DISCONTINUED | OUTPATIENT
Start: 2025-01-26 | End: 2025-01-28

## 2025-01-26 RX ORDER — ACETAMINOPHEN 160 MG/5ML
650 SUSPENSION ORAL EVERY 6 HOURS
Refills: 0 | Status: DISCONTINUED | OUTPATIENT
Start: 2025-01-26 | End: 2025-01-29

## 2025-01-26 RX ADMIN — IBUPROFEN 400 MILLIGRAM(S): 600 TABLET, FILM COATED ORAL at 17:40

## 2025-01-26 RX ADMIN — GABAPENTIN 100 MILLIGRAM(S): 800 TABLET ORAL at 18:00

## 2025-01-26 RX ADMIN — Medication 2: at 22:42

## 2025-01-26 RX ADMIN — OXYCODONE HYDROCHLORIDE 2.5 MILLIGRAM(S): 30 TABLET ORAL at 21:30

## 2025-01-26 RX ADMIN — ACETAMINOPHEN 650 MILLIGRAM(S): 160 SUSPENSION ORAL at 11:08

## 2025-01-26 RX ADMIN — PIPERACILLIN SODIUM AND TAZOBACTAM SODIUM 25 GRAM(S): 2; 250 INJECTION, POWDER, FOR SOLUTION INTRAVENOUS at 17:23

## 2025-01-26 RX ADMIN — GABAPENTIN 100 MILLIGRAM(S): 800 TABLET ORAL at 04:04

## 2025-01-26 RX ADMIN — Medication 1: at 17:58

## 2025-01-26 RX ADMIN — LIDOCAINE HYDROCHLORIDE 1 PATCH: 30 CREAM TOPICAL at 15:35

## 2025-01-26 RX ADMIN — ACETAMINOPHEN 650 MILLIGRAM(S): 160 SUSPENSION ORAL at 19:00

## 2025-01-26 RX ADMIN — ACETAMINOPHEN 650 MILLIGRAM(S): 160 SUSPENSION ORAL at 10:08

## 2025-01-26 RX ADMIN — LIDOCAINE HYDROCHLORIDE 1 PATCH: 30 CREAM TOPICAL at 18:02

## 2025-01-26 RX ADMIN — LIDOCAINE HYDROCHLORIDE 20 MILLILITER(S): 10 INJECTION EPIDURAL; INFILTRATION; INTRACAUDAL at 12:47

## 2025-01-26 RX ADMIN — OXYCODONE HYDROCHLORIDE 2.5 MILLIGRAM(S): 30 TABLET ORAL at 22:30

## 2025-01-26 RX ADMIN — VANCOMYCIN HYDROCHLORIDE 166.67 MILLIGRAM(S): KIT at 15:36

## 2025-01-26 RX ADMIN — GABAPENTIN 100 MILLIGRAM(S): 800 TABLET ORAL at 10:10

## 2025-01-26 RX ADMIN — IBUPROFEN 400 MILLIGRAM(S): 600 TABLET, FILM COATED ORAL at 16:43

## 2025-01-26 RX ADMIN — Medication 100 GRAM(S): at 08:50

## 2025-01-26 RX ADMIN — ACETAMINOPHEN 650 MILLIGRAM(S): 160 SUSPENSION ORAL at 18:00

## 2025-01-26 NOTE — PROGRESS NOTE ADULT - SUBJECTIVE AND OBJECTIVE BOX
Patient is a 39y old  Female who presents with a chief complaint of diabetic foot ulcer (26 Jan 2025 06:23)      INTERVAL HPI/ OVERNIGHT EVENTS  pt seen bedside. Dressing intact. plan for bone biopsy with attending    LABS                        7.9    7.05  )-----------( 220      ( 26 Jan 2025 07:38 )             26.1     01-26    139  |  112[H]  |  14  ----------------------------<  121[H]  4.0   |  18[L]  |  0.54    Ca    8.3[L]      26 Jan 2025 07:38  Phos  2.5     01-26  Mg     1.9     01-26    TPro  6.5  /  Alb  4.0  /  TBili  <0.2  /  DBili  x   /  AST  21  /  ALT  29  /  AlkPhos  131[H]  01-25    PT/INR - ( 25 Jan 2025 12:36 )   PT: 11.8 sec;   INR: 1.01          PTT - ( 25 Jan 2025 12:36 )  PTT:33.8 sec  ESR: 32  CRP: --  01-25 @ 12:36    ICU Vital Signs Last 24 Hrs  T(C): 36.8 (26 Jan 2025 05:57), Max: 37.2 (25 Jan 2025 13:53)  T(F): 98.3 (26 Jan 2025 05:57), Max: 98.9 (25 Jan 2025 13:53)  HR: 70 (26 Jan 2025 05:57) (62 - 70)  BP: 133/73 (26 Jan 2025 05:57) (133/73 - 152/83)  BP(mean): --  ABP: --  ABP(mean): --  RR: 18 (26 Jan 2025 05:57) (17 - 18)  SpO2: 100% (26 Jan 2025 05:57) (98% - 100%)    O2 Parameters below as of 26 Jan 2025 05:57  Patient On (Oxygen Delivery Method): room air            RADIOLOGY    MICROBIOLOGY      PHYSICAL EXAM  Right Lower Extremity Focused:  Vasc: 2/4 PT and DP, CFT wnl, TG warm to warm, no erythema, edema to hallux  Derm: Open wound to distal tip of hallux, +PTB, 2cc purulent drainage upon manual pressure, -malodor,- fluctuance  Neuro: Protective sensations intact  MSK: +TTP to hallux

## 2025-01-26 NOTE — PROGRESS NOTE ADULT - ASSESSMENT
40 yo F PMH DM reporting worsening ulcer to the right great toe in the toe pad. Podiatry consulted for evaluation of wound. At the time of consult, VSS except hypertensive, No leukocytosis, CRP wnl, ESR elevated to 32. Clinically, wound probes to bone with purulence. High concern of OM. Pt not amneable for amputation and wants to go for long term IV abx. Plan for Bone Biopsy 1/26 later today    Plan:  -Pt consented for Bone Biopsy 1/26  -please hold the IV abx until after Bone Biopsy 1/26   -f/u MR w/ contrast   -Recc ID consult after BB  -Pt will need 6 weeks of IV abx  -foot dressed with betadine DSD  -WBAT  -rest of care upto primary team    Podiatry to follow, plan d/w attending

## 2025-01-26 NOTE — PROCEDURE NOTE - GENERAL PROCEDURE DETAILS
R hallux prepped and 1% lidocaine of 10cc given in rojas block fashion. Using sterile Jamshidi needle bone sample taken from R distal phalanx of hallux and sent for CX/path.

## 2025-01-26 NOTE — PROGRESS NOTE ADULT - SUBJECTIVE AND OBJECTIVE BOX
OVERNIGHT EVENTS:    SUBJECTIVE / INTERVAL HPI: Patient seen and examined at bedside.     VITAL SIGNS:  Vital Signs Last 24 Hrs  T(C): 36.8 (26 Jan 2025 05:57), Max: 37.2 (25 Jan 2025 13:53)  T(F): 98.3 (26 Jan 2025 05:57), Max: 98.9 (25 Jan 2025 13:53)  HR: 70 (26 Jan 2025 05:57) (62 - 70)  BP: 133/73 (26 Jan 2025 05:57) (133/73 - 152/83)  BP(mean): --  RR: 18 (26 Jan 2025 05:57) (17 - 18)  SpO2: 100% (26 Jan 2025 05:57) (98% - 100%)    Parameters below as of 26 Jan 2025 05:57  Patient On (Oxygen Delivery Method): room air      I&O's Summary      PHYSICAL EXAM:    General: WDWN  HEENT: NC/AT; PERRL, anicteric sclera; MMM  Neck: supple  Cardiovascular: +S1/S2; RRR  Respiratory: CTA B/L; no W/R/R  Gastrointestinal: soft, NT/ND; +BSx4  Extremities: WWP; no edema, clubbing or cyanosis  Vascular: 2+ radial, DP/PT pulses B/L  Neurological: AAOx3; no focal deficits    MEDICATIONS:  MEDICATIONS  (STANDING):  dextrose 5%. 1000 milliLiter(s) (100 mL/Hr) IV Continuous <Continuous>  dextrose 5%. 1000 milliLiter(s) (50 mL/Hr) IV Continuous <Continuous>  dextrose 50% Injectable 25 Gram(s) IV Push once  dextrose 50% Injectable 12.5 Gram(s) IV Push once  dextrose 50% Injectable 25 Gram(s) IV Push once  enoxaparin Injectable 40 milliGRAM(s) SubCutaneous every 24 hours  gabapentin 100 milliGRAM(s) Oral every 8 hours  glucagon  Injectable 1 milliGRAM(s) IntraMuscular once  influenza   Vaccine 0.5 milliLiter(s) IntraMuscular once  insulin lispro (ADMELOG) corrective regimen sliding scale   SubCutaneous Before meals and at bedtime    MEDICATIONS  (PRN):  acetaminophen     Tablet .. 650 milliGRAM(s) Oral every 6 hours PRN Moderate Pain (4 - 6)  albuterol    0.083% 2.5 milliGRAM(s) Nebulizer every 6 hours PRN Wheezing  albuterol    90 MICROgram(s) HFA Inhaler 1 Puff(s) Inhalation every 4 hours PRN Wheezing  dextrose Oral Gel 15 Gram(s) Oral once PRN Blood Glucose LESS THAN 70 milliGRAM(s)/deciliter      ALLERGIES:  Allergies    shellfish (Anaphylaxis)  No Known Drug Allergies    Intolerances        LABS:                        8.1    9.67  )-----------( 223      ( 25 Jan 2025 12:36 )             25.2     01-25    139  |  112[H]  |  12  ----------------------------<  127[H]  4.3   |  19[L]  |  0.62    Ca    8.0[L]      25 Jan 2025 12:36    TPro  6.5  /  Alb  4.0  /  TBili  <0.2  /  DBili  x   /  AST  21  /  ALT  29  /  AlkPhos  131[H]  01-25    PT/INR - ( 25 Jan 2025 12:36 )   PT: 11.8 sec;   INR: 1.01          PTT - ( 25 Jan 2025 12:36 )  PTT:33.8 sec  Urinalysis Basic - ( 25 Jan 2025 12:36 )    Color: x / Appearance: x / SG: x / pH: x  Gluc: 127 mg/dL / Ketone: x  / Bili: x / Urobili: x   Blood: x / Protein: x / Nitrite: x   Leuk Esterase: x / RBC: x / WBC x   Sq Epi: x / Non Sq Epi: x / Bacteria: x      CAPILLARY BLOOD GLUCOSE      POCT Blood Glucose.: 159 mg/dL (25 Jan 2025 22:20)      RADIOLOGY & ADDITIONAL TESTS: Reviewed.   OVERNIGHT EVENTS: Consuelo    SUBJECTIVE / INTERVAL HPI: Patient seen and examined at bedside. She admits to pain in her right big toe with pus oozing out. She also states she feels as though the pain is in her bone itself. Otherwise, she denies fevers, chills, CP, SOB, abdominal pain, nausea, vomiting.     VITAL SIGNS:  Vital Signs Last 24 Hrs  T(C): 36.8 (26 Jan 2025 05:57), Max: 37.2 (25 Jan 2025 13:53)  T(F): 98.3 (26 Jan 2025 05:57), Max: 98.9 (25 Jan 2025 13:53)  HR: 70 (26 Jan 2025 05:57) (62 - 70)  BP: 133/73 (26 Jan 2025 05:57) (133/73 - 152/83)  BP(mean): --  RR: 18 (26 Jan 2025 05:57) (17 - 18)  SpO2: 100% (26 Jan 2025 05:57) (98% - 100%)    Parameters below as of 26 Jan 2025 05:57  Patient On (Oxygen Delivery Method): room air      I&O's Summary      PHYSICAL EXAM:    General: resting comfortably, in no acute distress  HEENT: NC/AT; PERRL, anicteric sclera; MMM  Neck: supple  Cardiovascular: +S1/S2; RRR  Respiratory: CTA B/L; no W/R/R  Gastrointestinal: soft, NT/ND; +BSx4  Extremities: WWP; no edema, clubbing or cyanosis  MSK: R halux with small ulcer that expresses purulence with swelling and tenderness to palpation around the toe and medial metatarsal  Neurological: AAOx3; no focal deficits    MEDICATIONS:  MEDICATIONS  (STANDING):  dextrose 5%. 1000 milliLiter(s) (100 mL/Hr) IV Continuous <Continuous>  dextrose 5%. 1000 milliLiter(s) (50 mL/Hr) IV Continuous <Continuous>  dextrose 50% Injectable 25 Gram(s) IV Push once  dextrose 50% Injectable 12.5 Gram(s) IV Push once  dextrose 50% Injectable 25 Gram(s) IV Push once  enoxaparin Injectable 40 milliGRAM(s) SubCutaneous every 24 hours  gabapentin 100 milliGRAM(s) Oral every 8 hours  glucagon  Injectable 1 milliGRAM(s) IntraMuscular once  influenza   Vaccine 0.5 milliLiter(s) IntraMuscular once  insulin lispro (ADMELOG) corrective regimen sliding scale   SubCutaneous Before meals and at bedtime    MEDICATIONS  (PRN):  acetaminophen     Tablet .. 650 milliGRAM(s) Oral every 6 hours PRN Moderate Pain (4 - 6)  albuterol    0.083% 2.5 milliGRAM(s) Nebulizer every 6 hours PRN Wheezing  albuterol    90 MICROgram(s) HFA Inhaler 1 Puff(s) Inhalation every 4 hours PRN Wheezing  dextrose Oral Gel 15 Gram(s) Oral once PRN Blood Glucose LESS THAN 70 milliGRAM(s)/deciliter      ALLERGIES:  Allergies    shellfish (Anaphylaxis)  No Known Drug Allergies    Intolerances        LABS:                        8.1    9.67  )-----------( 223      ( 25 Jan 2025 12:36 )             25.2     01-25    139  |  112[H]  |  12  ----------------------------<  127[H]  4.3   |  19[L]  |  0.62    Ca    8.0[L]      25 Jan 2025 12:36    TPro  6.5  /  Alb  4.0  /  TBili  <0.2  /  DBili  x   /  AST  21  /  ALT  29  /  AlkPhos  131[H]  01-25    PT/INR - ( 25 Jan 2025 12:36 )   PT: 11.8 sec;   INR: 1.01          PTT - ( 25 Jan 2025 12:36 )  PTT:33.8 sec  Urinalysis Basic - ( 25 Jan 2025 12:36 )    Color: x / Appearance: x / SG: x / pH: x  Gluc: 127 mg/dL / Ketone: x  / Bili: x / Urobili: x   Blood: x / Protein: x / Nitrite: x   Leuk Esterase: x / RBC: x / WBC x   Sq Epi: x / Non Sq Epi: x / Bacteria: x      CAPILLARY BLOOD GLUCOSE      POCT Blood Glucose.: 159 mg/dL (25 Jan 2025 22:20)      RADIOLOGY & ADDITIONAL TESTS: Reviewed.

## 2025-01-26 NOTE — PROGRESS NOTE ADULT - PROBLEM SELECTOR PLAN 1
p/w diabetic foot ulcer w/ (+) PTB testing in ED per podiatry    R foot XR wet read negative for OM per podiatry, no gas tracking  Low concern for cellulitis of overlying soft tissue, no zara erythema or swelling     - Podiatry consulted, appreciate recs   - plan for bone biopsy 1/26 at 1 pm   - will initiate broad spectrum Abx with vanc and psA zosyn after biopsy is completed   - ID consult monday AM for prolonged IV abx course   - MR right foot ordered and pregnancy test ordered as well   - deferring t/w ABx in order to increase bone biopsy yield   - f/u formal XR read p/w diabetic foot ulcer w/ (+) PTB testing in ED per podiatry    R foot XR wet read negative for OM per podiatry, no gas tracking  Low concern for cellulitis of overlying soft tissue, no zara erythema or swelling   X ray toes, R foot: No acute fracture or dislocation. Soft tissue ulceration of the distal first phalanx. No radiographic evidence of osteomyelitis.  S/p bone biopsy today, vanc and zosyn were resumed after biopsy  - Podiatry consulted, appreciate recs   - ID consult monday AM for prolonged IV abx course   - MR right foot ordered and pregnancy test ordered as well  -Check RLE arterial doppler, RLE venous doppler

## 2025-01-26 NOTE — PROGRESS NOTE ADULT - ASSESSMENT
This is a 40yo F PMH of IDDM and asthma p/w acute purulence of chronic R hallux wound FTH PTB (+) admitted to [facility] for workup of osteomyelitis.

## 2025-01-26 NOTE — PROGRESS NOTE ADULT - PROBLEM SELECTOR PLAN 2
not on any home meds    - low ISS w/ FGS   - f/u add-on a1c not on any home meds as pt says her sugars at home are well controlled  A1c 6.4  - low ISS w/ FGS Pt reports she was diagnosed with DM at around age 17, was told she has a mixture of type 1 and type 2 DM  She is not on any home meds as she says her sugars at home are well controlled  A1c 6.4  - low ISS w/ FGS

## 2025-01-27 ENCOUNTER — TRANSCRIPTION ENCOUNTER (OUTPATIENT)
Age: 40
End: 2025-01-27

## 2025-01-27 LAB
-  CLINDAMYCIN: SIGNIFICANT CHANGE UP
-  ERYTHROMYCIN: SIGNIFICANT CHANGE UP
-  GENTAMICIN: SIGNIFICANT CHANGE UP
-  OXACILLIN: SIGNIFICANT CHANGE UP
-  PENICILLIN: SIGNIFICANT CHANGE UP
-  RIFAMPIN: SIGNIFICANT CHANGE UP
-  TETRACYCLINE: SIGNIFICANT CHANGE UP
-  TRIMETHOPRIM/SULFAMETHOXAZOLE: SIGNIFICANT CHANGE UP
-  VANCOMYCIN: SIGNIFICANT CHANGE UP
BLD GP AB SCN SERPL QL: NEGATIVE — SIGNIFICANT CHANGE UP
FERRITIN SERPL-MCNC: 18 NG/ML — SIGNIFICANT CHANGE UP (ref 15–150)
HCT VFR BLD CALC: 24.2 % — LOW (ref 34.5–45)
HGB BLD-MCNC: 7.6 G/DL — LOW (ref 11.5–15.5)
IRON SATN MFR SERPL: 29 UG/DL — LOW (ref 30–160)
IRON SATN MFR SERPL: 9 % — LOW (ref 14–50)
MCHC RBC-ENTMCNC: 30.2 PG — SIGNIFICANT CHANGE UP (ref 27–34)
MCHC RBC-ENTMCNC: 31.4 G/DL — LOW (ref 32–36)
MCV RBC AUTO: 96 FL — SIGNIFICANT CHANGE UP (ref 80–100)
METHOD TYPE: SIGNIFICANT CHANGE UP
NRBC # BLD: 0 /100 WBCS — SIGNIFICANT CHANGE UP (ref 0–0)
NRBC BLD-RTO: 0 /100 WBCS — SIGNIFICANT CHANGE UP (ref 0–0)
PLATELET # BLD AUTO: 206 K/UL — SIGNIFICANT CHANGE UP (ref 150–400)
RBC # BLD: 2.52 M/UL — LOW (ref 3.8–5.2)
RBC # FLD: 15.9 % — HIGH (ref 10.3–14.5)
RH IG SCN BLD-IMP: POSITIVE — SIGNIFICANT CHANGE UP
TIBC SERPL-MCNC: 306 UG/DL — SIGNIFICANT CHANGE UP (ref 220–430)
UIBC SERPL-MCNC: 277 UG/DL — SIGNIFICANT CHANGE UP (ref 110–370)
WBC # BLD: 5.19 K/UL — SIGNIFICANT CHANGE UP (ref 3.8–10.5)
WBC # FLD AUTO: 5.19 K/UL — SIGNIFICANT CHANGE UP (ref 3.8–10.5)

## 2025-01-27 PROCEDURE — 93926 LOWER EXTREMITY STUDY: CPT | Mod: 26,RT

## 2025-01-27 PROCEDURE — 93971 EXTREMITY STUDY: CPT | Mod: 26,RT

## 2025-01-27 PROCEDURE — 99233 SBSQ HOSP IP/OBS HIGH 50: CPT | Mod: GC

## 2025-01-27 PROCEDURE — G0545: CPT

## 2025-01-27 PROCEDURE — 99222 1ST HOSP IP/OBS MODERATE 55: CPT

## 2025-01-27 RX ORDER — IBUPROFEN 600 MG/1
400 TABLET, FILM COATED ORAL ONCE
Refills: 0 | Status: COMPLETED | OUTPATIENT
Start: 2025-01-27 | End: 2025-01-27

## 2025-01-27 RX ORDER — IRON SUCROSE 20 MG/ML
300 INJECTION, SOLUTION INTRAVENOUS EVERY 24 HOURS
Refills: 0 | Status: DISCONTINUED | OUTPATIENT
Start: 2025-01-27 | End: 2025-01-27

## 2025-01-27 RX ORDER — IRON SUCROSE 20 MG/ML
400 INJECTION, SOLUTION INTRAVENOUS EVERY 24 HOURS
Refills: 0 | Status: COMPLETED | OUTPATIENT
Start: 2025-01-27 | End: 2025-01-29

## 2025-01-27 RX ADMIN — IBUPROFEN 400 MILLIGRAM(S): 600 TABLET, FILM COATED ORAL at 09:04

## 2025-01-27 RX ADMIN — GABAPENTIN 100 MILLIGRAM(S): 800 TABLET ORAL at 19:01

## 2025-01-27 RX ADMIN — Medication 1: at 22:48

## 2025-01-27 RX ADMIN — PIPERACILLIN SODIUM AND TAZOBACTAM SODIUM 25 GRAM(S): 2; 250 INJECTION, POWDER, FOR SOLUTION INTRAVENOUS at 19:07

## 2025-01-27 RX ADMIN — ACETAMINOPHEN 650 MILLIGRAM(S): 160 SUSPENSION ORAL at 01:00

## 2025-01-27 RX ADMIN — OXYCODONE HYDROCHLORIDE 2.5 MILLIGRAM(S): 30 TABLET ORAL at 22:51

## 2025-01-27 RX ADMIN — OXYCODONE HYDROCHLORIDE 2.5 MILLIGRAM(S): 30 TABLET ORAL at 17:00

## 2025-01-27 RX ADMIN — OXYCODONE HYDROCHLORIDE 2.5 MILLIGRAM(S): 30 TABLET ORAL at 23:50

## 2025-01-27 RX ADMIN — VANCOMYCIN HYDROCHLORIDE 166.67 MILLIGRAM(S): KIT at 17:36

## 2025-01-27 RX ADMIN — IRON SUCROSE 108 MILLIGRAM(S): 20 INJECTION, SOLUTION INTRAVENOUS at 16:22

## 2025-01-27 RX ADMIN — IBUPROFEN 400 MILLIGRAM(S): 600 TABLET, FILM COATED ORAL at 10:00

## 2025-01-27 RX ADMIN — ACETAMINOPHEN 650 MILLIGRAM(S): 160 SUSPENSION ORAL at 17:56

## 2025-01-27 RX ADMIN — ACETAMINOPHEN 650 MILLIGRAM(S): 160 SUSPENSION ORAL at 11:15

## 2025-01-27 RX ADMIN — ACETAMINOPHEN 650 MILLIGRAM(S): 160 SUSPENSION ORAL at 12:06

## 2025-01-27 RX ADMIN — Medication 1: at 17:47

## 2025-01-27 RX ADMIN — ACETAMINOPHEN 650 MILLIGRAM(S): 160 SUSPENSION ORAL at 06:43

## 2025-01-27 RX ADMIN — ACETAMINOPHEN 650 MILLIGRAM(S): 160 SUSPENSION ORAL at 07:43

## 2025-01-27 RX ADMIN — OXYCODONE HYDROCHLORIDE 2.5 MILLIGRAM(S): 30 TABLET ORAL at 16:04

## 2025-01-27 RX ADMIN — GABAPENTIN 100 MILLIGRAM(S): 800 TABLET ORAL at 03:39

## 2025-01-27 RX ADMIN — ACETAMINOPHEN 650 MILLIGRAM(S): 160 SUSPENSION ORAL at 00:05

## 2025-01-27 RX ADMIN — PIPERACILLIN SODIUM AND TAZOBACTAM SODIUM 25 GRAM(S): 2; 250 INJECTION, POWDER, FOR SOLUTION INTRAVENOUS at 08:30

## 2025-01-27 RX ADMIN — IBUPROFEN 400 MILLIGRAM(S): 600 TABLET, FILM COATED ORAL at 18:23

## 2025-01-27 RX ADMIN — IBUPROFEN 400 MILLIGRAM(S): 600 TABLET, FILM COATED ORAL at 17:35

## 2025-01-27 RX ADMIN — ACETAMINOPHEN 650 MILLIGRAM(S): 160 SUSPENSION ORAL at 17:06

## 2025-01-27 RX ADMIN — VANCOMYCIN HYDROCHLORIDE 166.67 MILLIGRAM(S): KIT at 06:43

## 2025-01-27 RX ADMIN — LIDOCAINE HYDROCHLORIDE 1 PATCH: 30 CREAM TOPICAL at 03:39

## 2025-01-27 RX ADMIN — PIPERACILLIN SODIUM AND TAZOBACTAM SODIUM 25 GRAM(S): 2; 250 INJECTION, POWDER, FOR SOLUTION INTRAVENOUS at 01:19

## 2025-01-27 RX ADMIN — GABAPENTIN 100 MILLIGRAM(S): 800 TABLET ORAL at 11:15

## 2025-01-27 NOTE — DISCHARGE NOTE PROVIDER - NSDCCPCAREPLAN_GEN_ALL_CORE_FT
PRINCIPAL DISCHARGE DIAGNOSIS  Diagnosis: Osteomyelitis  Assessment and Plan of Treatment: Osteomyelitis is an infection in a bone. It can affect one or more parts of a bone. Infections can reach a bone through the bloodstream or from nearby infected tissue. Infections also can begin in the bone if an injury opens the bone to germs.  People who smoke and people with chronic health conditions, such as diabetes or kidney failure, are at higher risk of getting osteomyelitis. People who have diabetes with foot ulcers may get osteomyelitis in the bones of their feet.  Please continue with antibiotics _____________     PRINCIPAL DISCHARGE DIAGNOSIS  Diagnosis: Osteomyelitis  Assessment and Plan of Treatment: Osteomyelitis is an infection in a bone. It can affect one or more parts of a bone. Infections can reach a bone through the bloodstream or from nearby infected tissue. Infections also can begin in the bone if an injury opens the bone to germs.  People who smoke and people with chronic health conditions, such as diabetes or kidney failure, are at higher risk of getting osteomyelitis. People who have diabetes with foot ulcers may get osteomyelitis in the bones of their feet.  Please continue with your total antibiotic course.   You were Discharged with PICC line on Cefazolin 2 g q8h for 6 weeks (1/26-3/8) and Flagyl 500 mg PO q12h for 2 weeks (1/26-2/8)  -You Will need weekly CBC with diff, CMP, ESR, CRP  -Outpatient follow up with . His office will call you for follow up     PRINCIPAL DISCHARGE DIAGNOSIS  Diagnosis: Osteomyelitis  Assessment and Plan of Treatment: Osteomyelitis is an infection in a bone. It can affect one or more parts of a bone. Infections can reach a bone through the bloodstream or from nearby infected tissue. Infections also can begin in the bone if an injury opens the bone to germs.  People who smoke and people with chronic health conditions, such as diabetes or kidney failure, are at higher risk of getting osteomyelitis. People who have diabetes with foot ulcers may get osteomyelitis in the bones of their feet.  Please continue with your total antibiotic course.   You were Discharged with PICC line on Cefazolin 2 g q8h for 6 weeks (1/26-3/8) and Flagyl 500 mg PO q12h for 2 weeks (1/26-2/8)  -Weekly labs: CMP, CBC with diff, ESR, CRP faxed to ID office at [phone]  -Please follow up with Dr. Pederson in 2 weeks ([address], [address], [phone]), ID office will call patient to schedule

## 2025-01-27 NOTE — CONSULT NOTE ADULT - ASSESSMENT
39 y.o female w/ IDDM, hx L great toe osteomyelitis s/p amputation, hx of R great toe OM s/p antibiotics in past who presents with R hallux ulcer x 3 months, draining purulent fluid x few weeks found to have OM of R 1st distal phalanx on MRI. Pt afebrile without leukocytosis. Wound culture growing staph aureus. Bcxs ngtd 24 hours. S/p bone biopsy 1/26 -- GS without orgs or PMNs. Agree with broad spectrum antibiotics while cultures pending. Patient will need 6 weeks of IV antibiotics.     Suggest:  -f/u bcxs   -f/u wound culture   -f/u bone bx culture and path   -continue vancomycin 1250mg IV Q12. Check trough prior to 4th dose (due tomorrow AM)   -continue zosyn 4.5g IV Q8 via EI     Team 2 will follow you.    Case d/w primary team.  Final recommendation pending attending note.    Ofelia Patel, Infectious Diseases PA  Please reach out for any questions 9 am-5pm.   For evenings and weekends, please call the ID physician on call.

## 2025-01-27 NOTE — PROGRESS NOTE ADULT - SUBJECTIVE AND OBJECTIVE BOX
Patient is a 39y old  Female who presents with a chief complaint of diabetic foot ulcer (27 Jan 2025 06:39)      INTERVAL HPI/ OVERNIGHT EVENTS  pt seen bedside. Dressing changed by herself. Pain well controlled.    LABS                        7.9    7.05  )-----------( 220      ( 26 Jan 2025 07:38 )             26.1     01-26    139  |  112[H]  |  14  ----------------------------<  121[H]  4.0   |  18[L]  |  0.54    Ca    8.3[L]      26 Jan 2025 07:38  Phos  2.5     01-26  Mg     1.9     01-26    TPro  6.5  /  Alb  4.0  /  TBili  <0.2  /  DBili  x   /  AST  21  /  ALT  29  /  AlkPhos  131[H]  01-25    PT/INR - ( 25 Jan 2025 12:36 )   PT: 11.8 sec;   INR: 1.01          PTT - ( 25 Jan 2025 12:36 )  PTT:33.8 sec    ICU Vital Signs Last 24 Hrs  T(C): 37.2 (27 Jan 2025 06:07), Max: 37.2 (27 Jan 2025 06:07)  T(F): 98.9 (27 Jan 2025 06:07), Max: 98.9 (27 Jan 2025 06:07)  HR: 72 (27 Jan 2025 06:07) (67 - 74)  BP: 135/80 (27 Jan 2025 06:07) (135/80 - 145/82)  BP(mean): 103 (26 Jan 2025 21:06) (103 - 103)  ABP: --  ABP(mean): --  RR: 18 (27 Jan 2025 06:07) (17 - 18)  SpO2: 100% (27 Jan 2025 06:07) (98% - 100%)    O2 Parameters below as of 27 Jan 2025 06:07  Patient On (Oxygen Delivery Method): room air            RADIOLOGY  < from:  Foot w/wo IV Cont, Right (01.26.25 @ 19:39) >  IMPRESSION:  Osteomyelitis of the first distal phalanx.    --- End of Report ---          < end of copied text >    MICROBIOLOGY    PHYSICAL EXAM  Right Lower Extremity Focused:  Vasc: 2/4 PT and DP, CFT wnl, TG warm to warm, no erythema, edema to hallux  Derm: Open wound to distal tip of hallux, +PTB, 2cc purulent drainage upon manual pressure, -malodor,- fluctuance  Neuro: Protective sensations intact  MSK: +TTP to hallux

## 2025-01-27 NOTE — PROGRESS NOTE ADULT - ASSESSMENT
40 yo F PMH DM reporting worsening ulcer to the right great toe in the toe pad. Podiatry consulted for evaluation of wound. At the time of consult, VSS except hypertensive, No leukocytosis, CRP wnl, ESR elevated to 32. Clinically, wound probes to bone with purulence. High concern of OM. Pt not amneable for amputation and wants to go for long term IV abx. pt is s/p Bone Biopsy 1/26. MR reviewed. Pending CX/path    Plan:  -c/w IV abx   -Recc ID consult   -f/u BB Cx/Path  -Pt will need 6 weeks of IV abx  -pt will need VNS service upon dc  -foot dressed with betadine DSD  -WBAT  -rest of care upto primary team    Podiatry to follow, plan d/w attending

## 2025-01-27 NOTE — PROGRESS NOTE ADULT - PROBLEM SELECTOR PLAN 7
F s/p 1L NS  E: Replete PRN  N: Diet, consistent carbs   DVT ppx: VTE risk assessment score of 0   GI ppx: not indicated  Bowel: not indicated  Dispo: RMF    FULL CODE F s/p 1L NS  E: Replete PRN  N: Diet, consistent carbs   DVT ppx: lovenox  GI ppx: not indicated  Bowel: not indicated  Dispo: RMF    FULL CODE

## 2025-01-27 NOTE — PROGRESS NOTE ADULT - ASSESSMENT
This is a 40yo F PMH of IDDM and asthma p/w acute purulence of chronic R hallux wound FTH PTB (+) admitted to [facility] for workup of osteomyelitis.

## 2025-01-27 NOTE — DISCHARGE NOTE PROVIDER - ATTENDING DISCHARGE PHYSICAL EXAMINATION:
Appears comfortable   MMM  Normal WOB on RA, CTAB   RRR, no mrg   Abdomen soft, nontender, nondistended  Extremities warm and without edema, R foot wrapped in clean and dry gauze dressing   AOX3, no focal neuro deficits

## 2025-01-27 NOTE — PROGRESS NOTE ADULT - PROBLEM SELECTOR PLAN 2
Pt reports she was diagnosed with DM at around age 17, was told she has a mixture of type 1 and type 2 DM  She is not on any home meds as she says her sugars at home are well controlled  A1c 6.4  - low ISS w/ FGS

## 2025-01-27 NOTE — DISCHARGE NOTE PROVIDER - NSDCMRMEDTOKEN_GEN_ALL_CORE_FT
Albuterol (Eqv-Proventil HFA) 90 mcg/inh inhalation aerosol: 2 puff(s) inhaled every 6 hours, As Needed  amLODIPine 10 mg oral tablet: 1 tab(s) orally once a day  Eliquis 2.5 mg oral tablet: 1 tab(s) orally every 12 hours   ferrous sulfate 325 mg (65 mg elemental iron) oral tablet: 1 tab(s) orally 3 times a day  HumaLOG KwikPen 100 units/mL injectable solution: 15 unit(s) injectable 3 times a day  Lantus Solostar Pen 100 units/mL subcutaneous solution: 50 unit(s) subcutaneous once a day (at bedtime)  omeprazole 40 mg oral delayed release capsule: 1 cap(s) orally once a day   ondansetron 4 mg oral tablet, disintegratin tab(s) orally every 6 hours, As Needed -for nausea    Albuterol (Eqv-Proventil HFA) 90 mcg/inh inhalation aerosol: 2 puff(s) inhaled every 6 hours, As Needed  amLODIPine 10 mg oral tablet: 1 tab(s) orally once a day  Eliquis 2.5 mg oral tablet: 1 tab(s) orally every 12 hours   ferrous sulfate 325 mg (65 mg elemental iron) oral tablet: 1 tab(s) orally 3 times a day  HumaLOG KwikPen 100 units/mL injectable solution: 15 unit(s) injectable 3 times a day  Lantus Solostar Pen 100 units/mL subcutaneous solution: 50 unit(s) subcutaneous once a day (at bedtime)  metroNIDAZOLE 500 mg oral tablet: 1 tab(s) orally every 12 hours  omeprazole 40 mg oral delayed release capsule: 1 cap(s) orally once a day   ondansetron 4 mg oral tablet, disintegratin tab(s) orally every 6 hours, As Needed -for nausea    Albuterol (Eqv-Proventil HFA) 90 mcg/inh inhalation aerosol: 2 puff(s) inhaled every 6 hours, As Needed  ceFAZolin 2 g intravenous injection: 2 gram(s) intravenous every 8 hours THROUGH 3/8/2025  ferrous sulfate 325 mg (65 mg elemental iron) oral tablet: 1 tab(s) orally once a day  gabapentin 100 mg oral capsule: 1 cap(s) orally every 8 hours  metroNIDAZOLE 500 mg oral tablet: 1 tab(s) orally every 12 hours

## 2025-01-27 NOTE — CONSULT NOTE ADULT - NS ATTEND AMEND GEN_ALL_CORE FT
39F with hx superobesity s/p duodenal switch w/ sleeve gastrectomy (2021) and DM (A1c 6.4%) who presented on 1/25 with a several day history of increased swelling/purulent drainage from R hallux wound and subjective fevers. Afebrile on presentation, WBC normal, inflammatory markers neg. On podiatry’s exam there was wound at distal tip of R hallux with +PTB and active purulent drainage. MRI R foot w/wo IV contrast with OM of distal first phalanx. Received a dose of vanc/zoysn in the ED and then abx held until she underwent bone biopsy on 1/26 after which time vanc/zosyn was resumed. Bone bx cx pending; BCx ngtd; wound cx from 1/25 with S.aureus pending sensis. Pt declined amputation by podiatry. ID consulted to assist with abx management. Continue vanc/zosyn and follow cultures. Anticipate 6 week course of IV abx.

## 2025-01-27 NOTE — PROGRESS NOTE ADULT - PROBLEM SELECTOR PLAN 1
p/w diabetic foot ulcer w/ (+) PTB testing in ED per podiatry    R foot XR wet read negative for OM per podiatry, no gas tracking  Low concern for cellulitis of overlying soft tissue, no zara erythema or swelling   X ray toes, R foot: No acute fracture or dislocation. Soft tissue ulceration of the distal first phalanx. No radiographic evidence of osteomyelitis.  S/p bone biopsy today, vanc and zosyn were resumed after biopsy  - Podiatry consulted, appreciate recs   - ID consult monday AM for prolonged IV abx course   - MR right foot ordered and pregnancy test ordered as well  -Check RLE arterial doppler, RLE venous doppler p/w diabetic foot ulcer w/ (+) PTB testing in ED per podiatry    R foot XR wet read negative for OM per podiatry, no gas tracking  Low concern for cellulitis of overlying soft tissue, no zara erythema or swelling   X ray toes, R foot: No acute fracture or dislocation. Soft tissue ulceration of the distal first phalanx. No radiographic evidence of osteomyelitis.  S/p bone biopsy yesterday, vanc and zosyn were resumed after biopsy  MR right foot +osteomyelitis  -ID consulted- plans to continue Zosyn 4.5 g, vanc 1250 mg q12h, vanc trough 1/28 AM  - Podiatry consulted, appreciate recs   -Pending RLE arterial doppler, RLE venous doppler

## 2025-01-27 NOTE — DISCHARGE NOTE PROVIDER - HOSPITAL COURSE
This is a 40yo F PMH of IDDM and asthma p/w acute purulence of chronic R hallux wound FTH PTB (+) admitted to [facility] for workup of osteomyelitis.     Hospital Course (by problem):    #Osteomyelitis  #Diabetic ulcer of right foot.   p/w diabetic foot ulcer w/ (+) PTB testing in ED per podiatry    R foot XR wet read negative for OM per podiatry, no gas tracking  Low concern for cellulitis of overlying soft tissue, no zara erythema or swelling   X ray toes, R foot: No acute fracture or dislocation. Soft tissue ulceration of the distal first phalanx. No radiographic evidence of osteomyelitis.  S/p bone biopsy  MR right foot +osteomyelitis  RLE arterial doppler, RLE venous doppler negative  -ID consulted- s/p vanc, zosyn transition to cefazolin and metro, dc on _________________    #Diabetes mellitus, controlled.   Pt reports she was diagnosed with DM at around age 17, was told she has a mixture of type 1 and type 2 DM  She is not on any home meds as she says her sugars at home are well controlled  A1c 6.4  - low ISS w/ FGS  -outpatient follow up    #Neuropathy  Patient complaining of burning and tingling on BL lower extremities   -Gabapentin 100 TID    #Anemia  no signs or stigmata of bleeding. asymptomatic at this time. Hx bariatric surgery which may be affecting absorption  Total iron 29, %Iron sat 9  Ganzoni: 1329 mg total iron deficit  Plan:  -IV iron supplementation 400 mg, stop after 3 days, transition to PO outpatient    #Asthma  Diagnosed w/ asthma since childhood, has a history of intubation as well as t/w steroids   No wheezing on exam, not in acute exacerbation   Home meds: albuterol PRN   - c/w albuterol PRN    New medications: Iron, _______  Changes to old medications: None  Medications to be stopped: None      Physical exam at the time of discharge:   General: resting comfortably, in no acute distress  HEENT: NC/AT; PERRL, anicteric sclera; MMM  Neck: supple  Cardiovascular: +S1/S2; RRR  Respiratory: CTA B/L; no W/R/R  Gastrointestinal: soft, NT/ND; +BSx4  Extremities: WWP; no edema, clubbing or cyanosis  MSK: R halux with small ulcer, wrapped with gauze, with swelling and tenderness to palpation around the toe and medial metatarsal  Neurological: AAOx3; no focal deficits   This is a 38yo F PMH of IDDM and asthma p/w acute purulence of chronic R hallux wound FTH PTB (+) admitted to [facility] for workup of osteomyelitis.     Hospital Course (by problem):    #Osteomyelitis  #Diabetic ulcer of right foot.   p/w diabetic foot ulcer w/ (+) PTB testing in ED per podiatry    R foot XR wet read negative for OM per podiatry, no gas tracking  Low concern for cellulitis of overlying soft tissue, no zara erythema or swelling   X ray toes, R foot: No acute fracture or dislocation. Soft tissue ulceration of the distal first phalanx. No radiographic evidence of osteomyelitis.  S/p bone biopsy  MR right foot +osteomyelitis  RLE arterial doppler, RLE venous doppler negative  -ID consulted- s/p vanc, zosyn transition to cefazolin and metro, Discharged with PICC line on Cefazolin 2 g q8h for 6 weeks (1/26-3/8) and Flagyl 500 mg PO q12h for 2 weeks (1/26-2/8)  -Will need weekly CBC with diff, CMP, ESR, CRP  -Outpatient follow up with     #Diabetes mellitus, controlled.   Pt reports she was diagnosed with DM at around age 17, was told she has a mixture of type 1 and type 2 DM  She is not on any home meds as she says her sugars at home are well controlled  A1c 6.4  - low ISS w/ FGS  -outpatient follow up    #Neuropathy  Patient complaining of burning and tingling on BL lower extremities   -Gabapentin 100 TID    #Anemia  no signs or stigmata of bleeding. asymptomatic at this time. Hx bariatric surgery which may be affecting absorption  Total iron 29, %Iron sat 9  Ganzoni: 1329 mg total iron deficit  Plan:  -IV iron supplementation 400 mg, stop after 3 days, transition to PO outpatient    #Asthma  Diagnosed w/ asthma since childhood, has a history of intubation as well as t/w steroids   No wheezing on exam, not in acute exacerbation   Home meds: albuterol PRN   - c/w albuterol PRN    New medications: Iron, _______  Changes to old medications: None  Medications to be stopped: None      Physical exam at the time of discharge:   General: resting comfortably, in no acute distress  HEENT: NC/AT; PERRL, anicteric sclera; MMM  Neck: supple  Cardiovascular: +S1/S2; RRR  Respiratory: CTA B/L; no W/R/R  Gastrointestinal: soft, NT/ND; +BSx4  Extremities: WWP; no edema, clubbing or cyanosis  MSK: R halux with small ulcer, wrapped with gauze, with swelling and tenderness to palpation around the toe and medial metatarsal  Neurological: AAOx3; no focal deficits   This is a 38yo F PMH of IDDM and asthma p/w acute purulence of chronic R hallux wound FTH PTB (+) admitted to [facility] for workup of osteomyelitis.     Hospital Course (by problem):    #Osteomyelitis  #Diabetic ulcer of right foot.   p/w diabetic foot ulcer w/ (+) PTB testing in ED per podiatry    R foot XR wet read negative for OM per podiatry, no gas tracking  Low concern for cellulitis of overlying soft tissue, no zara erythema or swelling   X ray toes, R foot: No acute fracture or dislocation. Soft tissue ulceration of the distal first phalanx. No radiographic evidence of osteomyelitis.  S/p bone biopsy  MR right foot +osteomyelitis, Tissue culture 1/26/25: rare Staph aureus, few staph pettenkoferi  RLE arterial doppler, RLE venous doppler negative  -ID consulted- s/p vanc, zosyn transition to cefazolin and metro, Discharged with PICC line on Cefazolin 2 g q8h for 6 weeks (1/26-3/8) and Flagyl 500 mg PO q12h for 2 weeks (1/26-2/8)  -Weekly labs: CMP, CBC with diff, ESR, CRP faxed to ID office at [phone]  -Patient to follow up with Dr. Pederson in 2 weeks ([address], [phone]), ID office will call patient to schedule       #Diabetes mellitus, controlled.   Pt reports she was diagnosed with DM at around age 17, was told she has a mixture of type 1 and type 2 DM  She is not on any home meds as she says her sugars at home are well controlled  A1c 6.4  - low ISS w/ FGS  -outpatient follow up    #Neuropathy  Patient complaining of burning and tingling on BL lower extremities   -Gabapentin 100 TID    #Anemia  no signs or stigmata of bleeding. asymptomatic at this time. Hx bariatric surgery which may be affecting absorption  Total iron 29, %Iron sat 9  Ganzoni: 1329 mg total iron deficit  Plan:  -IV iron supplementation 400 mg, stop after 3 days, transition to PO outpatient    #Asthma  Diagnosed w/ asthma since childhood, has a history of intubation as well as t/w steroids   No wheezing on exam, not in acute exacerbation   Home meds: albuterol PRN   - c/w albuterol PRN    New medications: Iron, _______  Changes to old medications: None  Medications to be stopped: None      Physical exam at the time of discharge:   General: resting comfortably, in no acute distress  HEENT: NC/AT; PERRL, anicteric sclera; MMM  Neck: supple  Cardiovascular: +S1/S2; RRR  Respiratory: CTA B/L; no W/R/R  Gastrointestinal: soft, NT/ND; +BSx4  Extremities: WWP; no edema, clubbing or cyanosis  MSK: R halux with small ulcer, wrapped with gauze, with swelling and tenderness to palpation around the toe and medial metatarsal  Neurological: AAOx3; no focal deficits   This is a 40yo F PMH of IDDM and asthma p/w acute purulence of chronic R hallux wound FTH PTB (+) admitted to [facility] for workup of osteomyelitis.     Hospital Course (by problem):    #Osteomyelitis  #Diabetic ulcer of right foot.   p/w diabetic foot ulcer w/ (+) PTB testing in ED per podiatry    R foot XR wet read negative for OM per podiatry, no gas tracking  Low concern for cellulitis of overlying soft tissue, no zara erythema or swelling   X ray toes, R foot: No acute fracture or dislocation. Soft tissue ulceration of the distal first phalanx. No radiographic evidence of osteomyelitis.  S/p bone biopsy  MR right foot +osteomyelitis, Tissue culture 1/26/25: rare Staph aureus, few staph pettenkoferi  RLE arterial doppler, RLE venous doppler negative  -ID consulted- s/p vanc, zosyn transition to cefazolin and metro, Discharged with PICC line on Cefazolin 2 g q8h for 6 weeks (1/26-3/8) and Flagyl 500 mg PO q12h for 2 weeks (1/26-2/8)  -Weekly labs: CMP, CBC with diff, ESR, CRP faxed to ID office at [phone]  -Patient to follow up with Dr. Pederson in 2 weeks ([address], [phone]), ID office will call patient to schedule       #Diabetes mellitus, controlled.   Pt reports she was diagnosed with DM at around age 17, was told she has a mixture of type 1 and type 2 DM  She is not on any home meds as she says her sugars at home are well controlled  A1c 6.4  - low ISS w/ FGS  -outpatient follow up    #Neuropathy  Patient complaining of burning and tingling on BL lower extremities   -Gabapentin 100 TID    #Anemia  no signs or stigmata of bleeding. asymptomatic at this time. Hx bariatric surgery which may be affecting absorption  Total iron 29, %Iron sat 9  Ganzoni: 1329 mg total iron deficit  Plan:  -IV iron supplementation 400 mg, stop after 3 days, transition to PO outpatient    #Asthma  Diagnosed w/ asthma since childhood, has a history of intubation as well as t/w steroids   No wheezing on exam, not in acute exacerbation   Home meds: albuterol PRN   - c/w albuterol PRN    New medications: Iron, Flagyl, Cefazolin  Changes to old medications: None  Medications to be stopped: None      Physical exam at the time of discharge:   General: resting comfortably, in no acute distress  HEENT: NC/AT; PERRL, anicteric sclera; MMM  Neck: supple  Cardiovascular: +S1/S2; RRR  Respiratory: CTA B/L; no W/R/R  Gastrointestinal: soft, NT/ND; +BSx4  Extremities: WWP; no edema, clubbing or cyanosis  MSK: R halux with small ulcer, wrapped with gauze, with swelling and tenderness to palpation around the toe and medial metatarsal  Neurological: AAOx3; no focal deficits

## 2025-01-27 NOTE — CONSULT NOTE ADULT - SUBJECTIVE AND OBJECTIVE BOX
INFECTIOUS DISEASES INITIAL CONSULT NOTE    HPI:  39 y.o female w/ IDDM, hx L great toe osteomyelitis s/p amputation, hx of R great toe OM s/p antibiotics in past who presents with R hallux ulcer x 3 months, draining purulent fluid x few weeks. ID consulted for OM.   Per patient, she has neuropathy as a result of her diabetes and around 3 months ago she hit her R toe on something and developed an ulcer which never healed. She presented to [facility] 1month ago where she received 1 dose of IV abx and had foot XR showing no osteomyelitis, was discharged with plans to follow up with podiatry but never got a chance to. Prior to arrival, patient was feeling nausea and having headaches. Upon arrival, afebrile without leukocytosis. Labs notable for ESR 42, CRP wnl. XR without e/o OM. Wound is PTB on podiatry exam (wound culture taken - growing staph aureus) and MRI showing OM in 1st distal phalanx. She was given vanc and zosyn in ED which was then held for bone biopsy which was performed 1/26. Resumed on vanc and zosyn.       PAST MEDICAL & SURGICAL HISTORY:  Morbid obesity      HTN (hypertension)      HLD (hyperlipidemia)      DM2 (diabetes mellitus, type 2)      Asthma      Endometriosis      H/O ovarian cystectomy  right      History of cholecystectomy      History of appendectomy      H/O foot surgery  left            Review of Systems:   Constitutional, eyes, ENT, cardiovascular, respiratory, gastrointestinal, genitourinary, integumentary, neurological, psychiatric and heme/lymph are otherwise negative other than noted above       ANTIBIOTICS:  MEDICATIONS  (STANDING):  acetaminophen     Tablet .. 650 milliGRAM(s) Oral every 6 hours  dextrose 5%. 1000 milliLiter(s) (100 mL/Hr) IV Continuous <Continuous>  dextrose 5%. 1000 milliLiter(s) (50 mL/Hr) IV Continuous <Continuous>  dextrose 50% Injectable 25 Gram(s) IV Push once  dextrose 50% Injectable 12.5 Gram(s) IV Push once  dextrose 50% Injectable 25 Gram(s) IV Push once  enoxaparin Injectable 40 milliGRAM(s) SubCutaneous every 24 hours  gabapentin 100 milliGRAM(s) Oral every 8 hours  glucagon  Injectable 1 milliGRAM(s) IntraMuscular once  influenza   Vaccine 0.5 milliLiter(s) IntraMuscular once  insulin lispro (ADMELOG) corrective regimen sliding scale   SubCutaneous Before meals and at bedtime  lidocaine   4% Patch 1 Patch Transdermal every 24 hours  piperacillin/tazobactam IVPB.. 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)  -has dog, dog has not been near wound   -denies water exposure   -smokes marijuana   -denies etoh or tobacco       FAMILY HISTORY:  no FH leading to current infection    Vital Signs Last 24 Hrs  T(C): 37 (27 Jan 2025 11:56), Max: 37.2 (27 Jan 2025 06:07)  T(F): 98.6 (27 Jan 2025 11:56), Max: 98.9 (27 Jan 2025 06:07)  HR: 77 (27 Jan 2025 11:56) (67 - 77)  BP: 153/81 (27 Jan 2025 11:56) (135/80 - 153/81)  BP(mean): 103 (26 Jan 2025 21:06) (103 - 103)  RR: 17 (27 Jan 2025 11:56) (17 - 18)  SpO2: 99% (27 Jan 2025 11:56) (98% - 100%)    Parameters below as of 27 Jan 2025 11:56  Patient On (Oxygen Delivery Method): room air        01-26-25 @ 07:01  -  01-27-25 @ 07:00  --------------------------------------------------------  IN: 350 mL / OUT: 0 mL / NET: 350 mL      PHYSICAL EXAM:  Constitutional: alert, NAD  Eyes: the sclera and conjunctiva were normal.   ENT: the ears and nose were normal in appearance.   Neck: the appearance of the neck was normal and the neck was supple.   Pulmonary: no respiratory distress and symmetric chest rise   Vascular:. there was no peripheral edema  Abdomen: soft, non-tender  Neurological: no focal deficits.   Psychiatric: the affect was normal  R foot dressing c/d/i. Photo by podiatrist taken reviewed - tip of R great toe with dusky appearance, small wound distal end of R great toe. No surrounding erythema or drainage.     LABS:                        7.6    5.19  )-----------( 206      ( 27 Jan 2025 10:00 )             24.2     01-26    139  |  112[H]  |  14  ----------------------------<  121[H]  4.0   |  18[L]  |  0.54    Ca    8.3[L]      26 Jan 2025 07:38  Phos  2.5     01-26  Mg     1.9     01-26        Urinalysis Basic - ( 26 Jan 2025 07:38 )    Color: x / Appearance: x / SG: x / pH: x  Gluc: 121 mg/dL / Ketone: x  / Bili: x / Urobili: x   Blood: x / Protein: x / Nitrite: x   Leuk Esterase: x / RBC: x / WBC x   Sq Epi: x / Non Sq Epi: x / Bacteria: x        MICROBIOLOGY:    Culture - Tissue with Gram Stain (collected 01-26-25 @ 12:50)  Source: Tissue  Gram Stain (01-26-25 @ 22:55):    No polymorphonuclear cells seen per low power field    No organisms seen per oil power field    Culture - Wound Aerobic/Anaerobic (collected 01-25-25 @ 12:36)  Source: Skin/Wound  Preliminary Report (01-26-25 @ 18:18):    Moderate Staphylococcus aureus    Culture - Blood (collected 01-25-25 @ 12:36)  Source: .Blood BLOOD  Preliminary Report (01-26-25 @ 22:01):    No growth at 24 hours    Culture - Blood (collected 01-25-25 @ 12:36)  Source: .Blood BLOOD  Preliminary Report (01-26-25 @ 22:01):    No growth at 24 hours        RADIOLOGY & ADDITIONAL STUDIES:  reviewed 
Attending: Dr. Perez    Patient is a 39y old  Female who presents with a chief complaint of     HPI:   38 yo F PMH DM reporting worsening ulcer to the right great toe in the toe pad.  Patient states it first started approximately 3 months ago.  She went to [facility] and had an xray told to just keep it clean and to see podiatry.  Patient has been traveling and has been unable to see Podiatry but has kept the area clean and dry.  3 days ago, she started to have fever and the area became more swollen.  A large amount of pus drained from it yesterday, but there is still swelling, redness and drainage. Glucose has also been higher than is usual for her--200s-300s.    Review of systems negative except per HPI and as stated below  General:	 no weakness; no fevers, no chills  Skin/Breast: no rash  Respiratory and Thorax: no SOB, no cough  Cardiovascular:	No chest pain  Gastrointestinal:	 no nausea, vomiting , diarrhea  Genitourinary:	no dysuria, no difficulty urinating, no hematuria  Musculoskeletal:	no weakness, no joint swelling/pain  Neurological:	no focal weakness/numbness  Endocrine:	no polyuria, no polydipsia    PAST MEDICAL & SURGICAL HISTORY:  Morbid obesity      HTN (hypertension)      HLD (hyperlipidemia)      DM2 (diabetes mellitus, type 2)      Asthma      Endometriosis      H/O ovarian cystectomy  right      History of cholecystectomy      History of appendectomy      H/O foot surgery  left        Home Medications:  Albuterol (Eqv-Proventil HFA) 90 mcg/inh inhalation aerosol: 2 puff(s) inhaled every 6 hours, As Needed (08 Feb 2021 06:29)  amLODIPine 10 mg oral tablet: 1 tab(s) orally once a day (08 Feb 2021 06:29)  ferrous sulfate 325 mg (65 mg elemental iron) oral tablet: 1 tab(s) orally 3 times a day (08 Feb 2021 06:29)  HumaLOG KwikPen 100 units/mL injectable solution: 15 unit(s) injectable 3 times a day (08 Feb 2021 06:29)  Lantus Solostar Pen 100 units/mL subcutaneous solution: 50 unit(s) subcutaneous once a day (at bedtime) (08 Feb 2021 06:29)    Allergies    shellfish (Anaphylaxis)  No Known Drug Allergies    Intolerances      FAMILY HISTORY:    Social History:       LABS                        8.1    9.67  )-----------( 223      ( 25 Jan 2025 12:36 )             25.2     01-25    139  |  112[H]  |  12  ----------------------------<  127[H]  4.3   |  19[L]  |  0.62    Ca    8.0[L]      25 Jan 2025 12:36    TPro  6.5  /  Alb  4.0  /  TBili  <0.2  /  DBili  x   /  AST  21  /  ALT  29  /  AlkPhos  131[H]  01-25    PT/INR - ( 25 Jan 2025 12:36 )   PT: 11.8 sec;   INR: 1.01          PTT - ( 25 Jan 2025 12:36 )  PTT:33.8 sec  ESR: 32  CRP: --  01-25 @ 12:36    Vital Signs Last 24 Hrs  T(C): 37.2 (25 Jan 2025 13:53), Max: 37.2 (25 Jan 2025 13:53)  T(F): 98.9 (25 Jan 2025 13:53), Max: 98.9 (25 Jan 2025 13:53)  HR: 65 (25 Jan 2025 13:53) (65 - 68)  BP: 152/83 (25 Jan 2025 13:53) (137/85 - 152/83)  BP(mean): --  RR: 18 (25 Jan 2025 13:53) (18 - 18)  SpO2: 98% (25 Jan 2025 13:53) (98% - 100%)    Parameters below as of 25 Jan 2025 13:53  Patient On (Oxygen Delivery Method): room air        PHYSICAL EXAM  General: NAD, AA0x3    Right Lower Extremity Focused:  Vasc: 2/4 PT and DP, CFT wnl, TG warm to warm, no erythema, edema to hallux  Derm: Open wound to distal tip of hallux, +PTB, 2cc purulent drainage upon manual pressure, -malodor,- fluctuance  Neuro: Protective sensations intact  MSK: +TTP to hallux    RADIOLOGY  wet read negative for OM, acute fracture or gas proximal to the wound site

## 2025-01-27 NOTE — PROGRESS NOTE ADULT - SUBJECTIVE AND OBJECTIVE BOX
OVERNIGHT EVENTS:    SUBJECTIVE / INTERVAL HPI: Patient seen and examined at bedside.     VITAL SIGNS:  Vital Signs Last 24 Hrs  T(C): 37.2 (27 Jan 2025 06:07), Max: 37.2 (27 Jan 2025 06:07)  T(F): 98.9 (27 Jan 2025 06:07), Max: 98.9 (27 Jan 2025 06:07)  HR: 72 (27 Jan 2025 06:07) (67 - 74)  BP: 135/80 (27 Jan 2025 06:07) (135/80 - 145/82)  BP(mean): 103 (26 Jan 2025 21:06) (103 - 103)  RR: 18 (27 Jan 2025 06:07) (17 - 18)  SpO2: 100% (27 Jan 2025 06:07) (98% - 100%)    Parameters below as of 27 Jan 2025 06:07  Patient On (Oxygen Delivery Method): room air      I&O's Summary    26 Jan 2025 07:01  -  27 Jan 2025 06:39  --------------------------------------------------------  IN: 100 mL / OUT: 0 mL / NET: 100 mL        PHYSICAL EXAM:    General: WDWN  HEENT: NC/AT; PERRL, anicteric sclera; MMM  Neck: supple  Cardiovascular: +S1/S2; RRR  Respiratory: CTA B/L; no W/R/R  Gastrointestinal: soft, NT/ND; +BSx4  Extremities: WWP; no edema, clubbing or cyanosis  Vascular: 2+ radial, DP/PT pulses B/L  Neurological: AAOx3; no focal deficits    MEDICATIONS:  MEDICATIONS  (STANDING):  acetaminophen     Tablet .. 650 milliGRAM(s) Oral every 6 hours  dextrose 5%. 1000 milliLiter(s) (100 mL/Hr) IV Continuous <Continuous>  dextrose 5%. 1000 milliLiter(s) (50 mL/Hr) IV Continuous <Continuous>  dextrose 50% Injectable 25 Gram(s) IV Push once  dextrose 50% Injectable 12.5 Gram(s) IV Push once  dextrose 50% Injectable 25 Gram(s) IV Push once  enoxaparin Injectable 40 milliGRAM(s) SubCutaneous every 24 hours  gabapentin 100 milliGRAM(s) Oral every 8 hours  glucagon  Injectable 1 milliGRAM(s) IntraMuscular once  influenza   Vaccine 0.5 milliLiter(s) IntraMuscular once  insulin lispro (ADMELOG) corrective regimen sliding scale   SubCutaneous Before meals and at bedtime  lidocaine   4% Patch 1 Patch Transdermal every 24 hours  piperacillin/tazobactam IVPB.. 4.5 Gram(s) IV Intermittent every 8 hours  vancomycin  IVPB 1250 milliGRAM(s) IV Intermittent every 12 hours    MEDICATIONS  (PRN):  albuterol    0.083% 2.5 milliGRAM(s) Nebulizer every 6 hours PRN Wheezing  albuterol    90 MICROgram(s) HFA Inhaler 1 Puff(s) Inhalation every 4 hours PRN Wheezing  dextrose Oral Gel 15 Gram(s) Oral once PRN Blood Glucose LESS THAN 70 milliGRAM(s)/deciliter  oxyCODONE    IR 2.5 milliGRAM(s) Oral every 6 hours PRN Severe Pain (7 - 10)      ALLERGIES:  Allergies    shellfish (Anaphylaxis)  No Known Drug Allergies    Intolerances        LABS:                        7.9    7.05  )-----------( 220      ( 26 Jan 2025 07:38 )             26.1     01-26    139  |  112[H]  |  14  ----------------------------<  121[H]  4.0   |  18[L]  |  0.54    Ca    8.3[L]      26 Jan 2025 07:38  Phos  2.5     01-26  Mg     1.9     01-26    TPro  6.5  /  Alb  4.0  /  TBili  <0.2  /  DBili  x   /  AST  21  /  ALT  29  /  AlkPhos  131[H]  01-25    PT/INR - ( 25 Jan 2025 12:36 )   PT: 11.8 sec;   INR: 1.01          PTT - ( 25 Jan 2025 12:36 )  PTT:33.8 sec  Urinalysis Basic - ( 26 Jan 2025 07:38 )    Color: x / Appearance: x / SG: x / pH: x  Gluc: 121 mg/dL / Ketone: x  / Bili: x / Urobili: x   Blood: x / Protein: x / Nitrite: x   Leuk Esterase: x / RBC: x / WBC x   Sq Epi: x / Non Sq Epi: x / Bacteria: x      CAPILLARY BLOOD GLUCOSE      POCT Blood Glucose.: 202 mg/dL (26 Jan 2025 22:24)      RADIOLOGY & ADDITIONAL TESTS: Reviewed.   OVERNIGHT EVENTS: Consuelo    SUBJECTIVE / INTERVAL HPI: Patient seen and examined at bedside. Pt complains of a headache this morning located in posterior head. Denies photophobia, changes in vision, phonophobia. She admits to pain in her big toe with mild resolution. Otherwise, denies chest pain, SOB, abdominal pain, nausea, vomiting, dizziness, lightheadedness.     VITAL SIGNS:  Vital Signs Last 24 Hrs  T(C): 37.2 (27 Jan 2025 06:07), Max: 37.2 (27 Jan 2025 06:07)  T(F): 98.9 (27 Jan 2025 06:07), Max: 98.9 (27 Jan 2025 06:07)  HR: 72 (27 Jan 2025 06:07) (67 - 74)  BP: 135/80 (27 Jan 2025 06:07) (135/80 - 145/82)  BP(mean): 103 (26 Jan 2025 21:06) (103 - 103)  RR: 18 (27 Jan 2025 06:07) (17 - 18)  SpO2: 100% (27 Jan 2025 06:07) (98% - 100%)    Parameters below as of 27 Jan 2025 06:07  Patient On (Oxygen Delivery Method): room air      I&O's Summary    26 Jan 2025 07:01  -  27 Jan 2025 06:39  --------------------------------------------------------  IN: 100 mL / OUT: 0 mL / NET: 100 mL        PHYSICAL EXAM:  General: resting comfortably, in no acute distress  HEENT: NC/AT; PERRL, anicteric sclera; MMM  Neck: supple  Cardiovascular: +S1/S2; RRR  Respiratory: CTA B/L; no W/R/R  Gastrointestinal: soft, NT/ND; +BSx4  Extremities: WWP; no edema, clubbing or cyanosis  MSK: R halux with small ulcer that expresses purulence with swelling and tenderness to palpation around the toe and medial metatarsal  Neurological: AAOx3; no focal deficits    MEDICATIONS:  MEDICATIONS  (STANDING):  acetaminophen     Tablet .. 650 milliGRAM(s) Oral every 6 hours  dextrose 5%. 1000 milliLiter(s) (100 mL/Hr) IV Continuous <Continuous>  dextrose 5%. 1000 milliLiter(s) (50 mL/Hr) IV Continuous <Continuous>  dextrose 50% Injectable 25 Gram(s) IV Push once  dextrose 50% Injectable 12.5 Gram(s) IV Push once  dextrose 50% Injectable 25 Gram(s) IV Push once  enoxaparin Injectable 40 milliGRAM(s) SubCutaneous every 24 hours  gabapentin 100 milliGRAM(s) Oral every 8 hours  glucagon  Injectable 1 milliGRAM(s) IntraMuscular once  influenza   Vaccine 0.5 milliLiter(s) IntraMuscular once  insulin lispro (ADMELOG) corrective regimen sliding scale   SubCutaneous Before meals and at bedtime  lidocaine   4% Patch 1 Patch Transdermal every 24 hours  piperacillin/tazobactam IVPB.. 4.5 Gram(s) IV Intermittent every 8 hours  vancomycin  IVPB 1250 milliGRAM(s) IV Intermittent every 12 hours    MEDICATIONS  (PRN):  albuterol    0.083% 2.5 milliGRAM(s) Nebulizer every 6 hours PRN Wheezing  albuterol    90 MICROgram(s) HFA Inhaler 1 Puff(s) Inhalation every 4 hours PRN Wheezing  dextrose Oral Gel 15 Gram(s) Oral once PRN Blood Glucose LESS THAN 70 milliGRAM(s)/deciliter  oxyCODONE    IR 2.5 milliGRAM(s) Oral every 6 hours PRN Severe Pain (7 - 10)      ALLERGIES:  Allergies    shellfish (Anaphylaxis)  No Known Drug Allergies    Intolerances        LABS:                        7.9    7.05  )-----------( 220      ( 26 Jan 2025 07:38 )             26.1     01-26    139  |  112[H]  |  14  ----------------------------<  121[H]  4.0   |  18[L]  |  0.54    Ca    8.3[L]      26 Jan 2025 07:38  Phos  2.5     01-26  Mg     1.9     01-26    TPro  6.5  /  Alb  4.0  /  TBili  <0.2  /  DBili  x   /  AST  21  /  ALT  29  /  AlkPhos  131[H]  01-25    PT/INR - ( 25 Jan 2025 12:36 )   PT: 11.8 sec;   INR: 1.01          PTT - ( 25 Jan 2025 12:36 )  PTT:33.8 sec  Urinalysis Basic - ( 26 Jan 2025 07:38 )    Color: x / Appearance: x / SG: x / pH: x  Gluc: 121 mg/dL / Ketone: x  / Bili: x / Urobili: x   Blood: x / Protein: x / Nitrite: x   Leuk Esterase: x / RBC: x / WBC x   Sq Epi: x / Non Sq Epi: x / Bacteria: x      CAPILLARY BLOOD GLUCOSE      POCT Blood Glucose.: 202 mg/dL (26 Jan 2025 22:24)      RADIOLOGY & ADDITIONAL TESTS: Reviewed.

## 2025-01-27 NOTE — PROGRESS NOTE ADULT - PROBLEM SELECTOR PLAN 4
no signs or stigmata of bleeding. asymptomatic at this time    - f/u AM iron studies no signs or stigmata of bleeding. asymptomatic at this time. Hx bariatric surgery which may be affecting absorption  Total iron 29, %Iron sat 9  Ganzoni: 1329 mg total iron deficit  Plan:  -Start iron supplementation? no signs or stigmata of bleeding. asymptomatic at this time. Hx bariatric surgery which may be affecting absorption  Total iron 29, %Iron sat 9  Ganzoni: 1329 mg total iron deficit  Plan:  -IV iron supplementation 400 mg, stop after 3 days

## 2025-01-27 NOTE — CONSULT NOTE ADULT - NS ATTEND OPT1 GEN_ALL_CORE
Tiera Monet   [address] [facility] [id number]       ABOUT YOUR STRESS TEST    Dr. Misael Arevalo ordered a Lexiscan stress test for you.  This will be done at [facility]- Cardiology Department, located on 2nd floor.  If you have any questions please call [facility] at [phone] and ask for the Cardiology Department.       Please call within 24 hours if you are not able to keep your appointments.  There will be a one hour time span between the injection and your images.      THE RESTING INJECTION    Arrive on Friday July 3rd, 2020.  At 12:30 pm .    Do not eat for 2 hours prior to appointment.  You may drink juice or water.    Total visit time will be approximately 1.5 hours.    THE STRESS TEST    Arrive on Monday July 6th, 2020.  At 10:00 am .    Do not smoke or drink caffeine and or any decaffeinated products for 24 hours prior to the test.  This includes any coffees, teas, sodas, any foods and drinks containing chocolate, Excedrin, and Anacin.  The test may need to be rescheduled if you do not follow these instructions.  -Do not eat for 2 hour before your appointment.  You may drink water or juice.    -If you are a female age 50 or younger, you may be asked to have a pregnancy test.   -Wear loose comfortable clothing with good walking shoes.  Do not wear pantyhose, flip-flop shoes or slippers.  You may be walking uphill on a treadmill.    -Do not use Viagra, Cialis, or Levitra for 24 hours before your stress test.   -Do not use lotions or powder on the day of the testing.  Deodorant is okay.    Total visit time will be approximately 2 hours.      OTHER INSTRUCTIONS    Per Cardiology Department Protocol, to ensure our patient's safety and privacy, we do not allow family members in our testing rooms.  If you and your family members have any questions or concerns regarding the testing, we would be happy to answer your questions by phone or prior to the patient leaving the  waiting room.     I attest my time as attending is greater than 50% of the total combined time spent on qualifying patient care activities by the PA/NP and attending.

## 2025-01-28 ENCOUNTER — TRANSCRIPTION ENCOUNTER (OUTPATIENT)
Age: 40
End: 2025-01-28

## 2025-01-28 LAB
ALBUMIN SERPL ELPH-MCNC: 3.7 G/DL — SIGNIFICANT CHANGE UP (ref 3.3–5)
ALP SERPL-CCNC: 106 U/L — SIGNIFICANT CHANGE UP (ref 40–120)
ALT FLD-CCNC: 21 U/L — SIGNIFICANT CHANGE UP (ref 10–45)
ANION GAP SERPL CALC-SCNC: 9 MMOL/L — SIGNIFICANT CHANGE UP (ref 5–17)
AST SERPL-CCNC: 18 U/L — SIGNIFICANT CHANGE UP (ref 10–40)
BASOPHILS # BLD AUTO: 0.03 K/UL — SIGNIFICANT CHANGE UP (ref 0–0.2)
BASOPHILS NFR BLD AUTO: 0.7 % — SIGNIFICANT CHANGE UP (ref 0–2)
BILIRUB SERPL-MCNC: 0.2 MG/DL — SIGNIFICANT CHANGE UP (ref 0.2–1.2)
BUN SERPL-MCNC: 15 MG/DL — SIGNIFICANT CHANGE UP (ref 7–23)
CALCIUM SERPL-MCNC: 8 MG/DL — LOW (ref 8.4–10.5)
CHLORIDE SERPL-SCNC: 112 MMOL/L — HIGH (ref 96–108)
CO2 SERPL-SCNC: 19 MMOL/L — LOW (ref 22–31)
CREAT SERPL-MCNC: 0.62 MG/DL — SIGNIFICANT CHANGE UP (ref 0.5–1.3)
EGFR: 115 ML/MIN/1.73M2 — SIGNIFICANT CHANGE UP
EOSINOPHIL # BLD AUTO: 0.06 K/UL — SIGNIFICANT CHANGE UP (ref 0–0.5)
EOSINOPHIL NFR BLD AUTO: 1.3 % — SIGNIFICANT CHANGE UP (ref 0–6)
GLUCOSE SERPL-MCNC: 109 MG/DL — HIGH (ref 70–99)
HCT VFR BLD CALC: 25.8 % — LOW (ref 34.5–45)
HGB BLD-MCNC: 7.9 G/DL — LOW (ref 11.5–15.5)
IMM GRANULOCYTES NFR BLD AUTO: 0.7 % — SIGNIFICANT CHANGE UP (ref 0–0.9)
LYMPHOCYTES # BLD AUTO: 1.76 K/UL — SIGNIFICANT CHANGE UP (ref 1–3.3)
LYMPHOCYTES # BLD AUTO: 38.3 % — SIGNIFICANT CHANGE UP (ref 13–44)
MAGNESIUM SERPL-MCNC: 1.9 MG/DL — SIGNIFICANT CHANGE UP (ref 1.6–2.6)
MCHC RBC-ENTMCNC: 28.9 PG — SIGNIFICANT CHANGE UP (ref 27–34)
MCHC RBC-ENTMCNC: 30.6 G/DL — LOW (ref 32–36)
MCV RBC AUTO: 94.5 FL — SIGNIFICANT CHANGE UP (ref 80–100)
MONOCYTES # BLD AUTO: 0.36 K/UL — SIGNIFICANT CHANGE UP (ref 0–0.9)
MONOCYTES NFR BLD AUTO: 7.8 % — SIGNIFICANT CHANGE UP (ref 2–14)
NEUTROPHILS # BLD AUTO: 2.35 K/UL — SIGNIFICANT CHANGE UP (ref 1.8–7.4)
NEUTROPHILS NFR BLD AUTO: 51.2 % — SIGNIFICANT CHANGE UP (ref 43–77)
NRBC # BLD: 0 /100 WBCS — SIGNIFICANT CHANGE UP (ref 0–0)
NRBC BLD-RTO: 0 /100 WBCS — SIGNIFICANT CHANGE UP (ref 0–0)
PHOSPHATE SERPL-MCNC: 2.7 MG/DL — SIGNIFICANT CHANGE UP (ref 2.5–4.5)
PLATELET # BLD AUTO: 211 K/UL — SIGNIFICANT CHANGE UP (ref 150–400)
POTASSIUM SERPL-MCNC: 3.9 MMOL/L — SIGNIFICANT CHANGE UP (ref 3.5–5.3)
POTASSIUM SERPL-SCNC: 3.9 MMOL/L — SIGNIFICANT CHANGE UP (ref 3.5–5.3)
PROT SERPL-MCNC: 5.9 G/DL — LOW (ref 6–8.3)
RBC # BLD: 2.73 M/UL — LOW (ref 3.8–5.2)
RBC # FLD: 16 % — HIGH (ref 10.3–14.5)
SODIUM SERPL-SCNC: 140 MMOL/L — SIGNIFICANT CHANGE UP (ref 135–145)
VANCOMYCIN TROUGH SERPL-MCNC: 9.2 UG/ML — LOW (ref 10–20)
WBC # BLD: 4.59 K/UL — SIGNIFICANT CHANGE UP (ref 3.8–10.5)
WBC # FLD AUTO: 4.59 K/UL — SIGNIFICANT CHANGE UP (ref 3.8–10.5)

## 2025-01-28 PROCEDURE — 99232 SBSQ HOSP IP/OBS MODERATE 35: CPT

## 2025-01-28 PROCEDURE — 99232 SBSQ HOSP IP/OBS MODERATE 35: CPT | Mod: GC

## 2025-01-28 PROCEDURE — G0545: CPT

## 2025-01-28 RX ORDER — IBUPROFEN 600 MG/1
400 TABLET, FILM COATED ORAL ONCE
Refills: 0 | Status: COMPLETED | OUTPATIENT
Start: 2025-01-28 | End: 2025-01-28

## 2025-01-28 RX ORDER — MAGNESIUM SULFATE 0.8 MEQ/ML
1 AMPUL (ML) INJECTION ONCE
Refills: 0 | Status: COMPLETED | OUTPATIENT
Start: 2025-01-28 | End: 2025-01-28

## 2025-01-28 RX ORDER — CEFAZOLIN SODIUM IN 0.9 % NACL 2 G/10 ML
2000 SYRINGE (ML) INTRAVENOUS EVERY 8 HOURS
Refills: 0 | Status: DISCONTINUED | OUTPATIENT
Start: 2025-01-28 | End: 2025-01-29

## 2025-01-28 RX ORDER — IBUPROFEN 600 MG/1
400 TABLET, FILM COATED ORAL EVERY 8 HOURS
Refills: 0 | Status: DISCONTINUED | OUTPATIENT
Start: 2025-01-28 | End: 2025-01-29

## 2025-01-28 RX ORDER — POLYETHYLENE GLYCOL 3350 17 G/17G
17 POWDER, FOR SOLUTION ORAL EVERY 24 HOURS
Refills: 0 | Status: DISCONTINUED | OUTPATIENT
Start: 2025-01-28 | End: 2025-01-29

## 2025-01-28 RX ORDER — METRONIDAZOLE 500 MG
500 TABLET ORAL EVERY 12 HOURS
Refills: 0 | Status: DISCONTINUED | OUTPATIENT
Start: 2025-01-28 | End: 2025-01-29

## 2025-01-28 RX ORDER — VANCOMYCIN HYDROCHLORIDE 50 MG/ML
1500 KIT ORAL EVERY 12 HOURS
Refills: 0 | Status: DISCONTINUED | OUTPATIENT
Start: 2025-01-28 | End: 2025-01-28

## 2025-01-28 RX ORDER — ONDANSETRON 4 MG/1
4 TABLET, ORALLY DISINTEGRATING ORAL ONCE
Refills: 0 | Status: COMPLETED | OUTPATIENT
Start: 2025-01-28 | End: 2025-01-28

## 2025-01-28 RX ADMIN — Medication 500 MILLIGRAM(S): at 13:28

## 2025-01-28 RX ADMIN — Medication 500 MILLIGRAM(S): at 18:34

## 2025-01-28 RX ADMIN — ONDANSETRON 4 MILLIGRAM(S): 4 TABLET, ORALLY DISINTEGRATING ORAL at 17:02

## 2025-01-28 RX ADMIN — IBUPROFEN 400 MILLIGRAM(S): 600 TABLET, FILM COATED ORAL at 03:56

## 2025-01-28 RX ADMIN — ACETAMINOPHEN 650 MILLIGRAM(S): 160 SUSPENSION ORAL at 22:07

## 2025-01-28 RX ADMIN — ACETAMINOPHEN 650 MILLIGRAM(S): 160 SUSPENSION ORAL at 07:20

## 2025-01-28 RX ADMIN — ACETAMINOPHEN 650 MILLIGRAM(S): 160 SUSPENSION ORAL at 13:12

## 2025-01-28 RX ADMIN — ENOXAPARIN SODIUM 40 MILLIGRAM(S): 100 INJECTION SUBCUTANEOUS at 20:00

## 2025-01-28 RX ADMIN — IBUPROFEN 400 MILLIGRAM(S): 600 TABLET, FILM COATED ORAL at 04:50

## 2025-01-28 RX ADMIN — ACETAMINOPHEN 650 MILLIGRAM(S): 160 SUSPENSION ORAL at 23:07

## 2025-01-28 RX ADMIN — ACETAMINOPHEN 650 MILLIGRAM(S): 160 SUSPENSION ORAL at 12:12

## 2025-01-28 RX ADMIN — ACETAMINOPHEN 650 MILLIGRAM(S): 160 SUSPENSION ORAL at 01:00

## 2025-01-28 RX ADMIN — ACETAMINOPHEN 650 MILLIGRAM(S): 160 SUSPENSION ORAL at 06:23

## 2025-01-28 RX ADMIN — Medication 100 MILLIGRAM(S): at 12:13

## 2025-01-28 RX ADMIN — IBUPROFEN 400 MILLIGRAM(S): 600 TABLET, FILM COATED ORAL at 10:00

## 2025-01-28 RX ADMIN — LIDOCAINE HYDROCHLORIDE 1 PATCH: 30 CREAM TOPICAL at 18:21

## 2025-01-28 RX ADMIN — POLYETHYLENE GLYCOL 3350 17 GRAM(S): 17 POWDER, FOR SOLUTION ORAL at 19:04

## 2025-01-28 RX ADMIN — IRON SUCROSE 108 MILLIGRAM(S): 20 INJECTION, SOLUTION INTRAVENOUS at 15:14

## 2025-01-28 RX ADMIN — ACETAMINOPHEN 650 MILLIGRAM(S): 160 SUSPENSION ORAL at 00:07

## 2025-01-28 RX ADMIN — Medication 100 MILLIGRAM(S): at 21:26

## 2025-01-28 RX ADMIN — GABAPENTIN 100 MILLIGRAM(S): 800 TABLET ORAL at 10:05

## 2025-01-28 RX ADMIN — GABAPENTIN 100 MILLIGRAM(S): 800 TABLET ORAL at 18:34

## 2025-01-28 RX ADMIN — Medication 1: at 18:38

## 2025-01-28 RX ADMIN — IBUPROFEN 400 MILLIGRAM(S): 600 TABLET, FILM COATED ORAL at 20:05

## 2025-01-28 RX ADMIN — Medication 100 GRAM(S): at 10:02

## 2025-01-28 RX ADMIN — IBUPROFEN 400 MILLIGRAM(S): 600 TABLET, FILM COATED ORAL at 11:00

## 2025-01-28 RX ADMIN — PIPERACILLIN SODIUM AND TAZOBACTAM SODIUM 25 GRAM(S): 2; 250 INJECTION, POWDER, FOR SOLUTION INTRAVENOUS at 03:14

## 2025-01-28 RX ADMIN — IBUPROFEN 400 MILLIGRAM(S): 600 TABLET, FILM COATED ORAL at 19:05

## 2025-01-28 RX ADMIN — Medication 1: at 22:10

## 2025-01-28 NOTE — PROGRESS NOTE ADULT - SUBJECTIVE AND OBJECTIVE BOX
Patient is a 39y old  Female who presents with a chief complaint of diabetic foot ulcer (28 Jan 2025 06:36)      INTERVAL HPI/ OVERNIGHT EVENTS  seen bedside. Dressing C/D/I    LABS                        7.9    4.59  )-----------( 211      ( 28 Jan 2025 05:30 )             25.8     01-28    140  |  112[H]  |  15  ----------------------------<  109[H]  3.9   |  19[L]  |  0.62    Ca    8.0[L]      28 Jan 2025 05:30  Phos  2.7     01-28  Mg     1.9     01-28    TPro  5.9[L]  /  Alb  3.7  /  TBili  0.2  /  DBili  x   /  AST  18  /  ALT  21  /  AlkPhos  106  01-28        ICU Vital Signs Last 24 Hrs  T(C): 36.4 (28 Jan 2025 05:31), Max: 37 (27 Jan 2025 11:56)  T(F): 97.6 (28 Jan 2025 05:31), Max: 98.6 (27 Jan 2025 11:56)  HR: 68 (28 Jan 2025 05:31) (66 - 77)  BP: 162/94 (28 Jan 2025 05:31) (153/81 - 162/94)  BP(mean): 113 (27 Jan 2025 21:33) (113 - 113)  ABP: --  ABP(mean): --  RR: 17 (28 Jan 2025 05:31) (16 - 17)  SpO2: 99% (28 Jan 2025 05:31) (99% - 99%)    O2 Parameters below as of 28 Jan 2025 05:31  Patient On (Oxygen Delivery Method): room air            RADIOLOGY  < from: MR Foot w/wo IV Cont, Right (01.26.25 @ 19:39) >  IMPRESSION:  Osteomyelitis of the first distal phalanx.    --- End of Report ---    < end of copied text >    MICROBIOLOGY  Culture - Tissue with Gram Stain (01.26.25 @ 12:50)    Gram Stain:   No polymorphonuclear cells seen per low power field  No organisms seen per oil power field   Specimen Source: Tissue   Culture Results:   Rare Staphylococcus aureus  Few Staphylococcus pettenkoferi    Culture - Wound Aerobic/Anaerobic (01.25.25 @ 12:36)    -  Clindamycin: S <=0.25   -  Erythromycin: S <=0.25   -  Gentamicin: S <=4 Should not be used as monotherapy   -  Oxacillin: S 0.5 Oxacillin predicts susceptibility for dicloxacillin, methicillin, and nafcillin   -  Penicillin: R >2   -  Rifampin: S <=1 Should not be used as monotherapy   -  Tetracycline: S <=4   -  Trimethoprim/Sulfamethoxazole: S <=0.5/9.5   -  Vancomycin: S 1   Specimen Source: Skin/Wound   Culture Results:   Moderate Staphylococcus aureus  Numerous Streptococcus intermedius "Susceptibilities not performed"   Organism Identification: Staphylococcus aureus   Organism: Staphylococcus aureus   Method Type: JEFF          PHYSICAL EXAM  Right Lower Extremity Focused:  Vasc: 2/4 PT and DP, CFT wnl, TG warm to warm, no erythema, edema to hallux  Derm: Open wound to distal tip of hallux, +PTB, 2cc purulent drainage upon manual pressure, -malodor,- fluctuance  Neuro: Protective sensations intact  MSK: +TTP to hallux

## 2025-01-28 NOTE — DISCHARGE NOTE NURSING/CASE MANAGEMENT/SOCIAL WORK - FINANCIAL ASSISTANCE
[facility] provides services at a reduced cost to those who are determined to be eligible through [facility]’s financial assistance program. Information regarding [facility]’s financial assistance program can be found by going to [email or web address] or by calling [phone].

## 2025-01-28 NOTE — DISCHARGE NOTE NURSING/CASE MANAGEMENT/SOCIAL WORK - PATIENT PORTAL LINK FT
You can access the FollowMyHealth Patient Portal offered by [facility] by registering at the following website: [email or web address]. By joining [employer]’s FollowMyHealth portal, you will also be able to view your health information using other applications (apps) compatible with our system.

## 2025-01-28 NOTE — PROGRESS NOTE ADULT - PROBLEM SELECTOR PLAN 4
no signs or stigmata of bleeding. asymptomatic at this time. Hx bariatric surgery which may be affecting absorption  Total iron 29, %Iron sat 9  Ganzoni: 1329 mg total iron deficit  Plan:  -IV iron supplementation 400 mg, stop after 3 days

## 2025-01-28 NOTE — PROGRESS NOTE ADULT - ASSESSMENT
38 yo F PMH DM reporting worsening ulcer to the right great toe in the toe pad. Podiatry consulted for evaluation of wound. At the time of consult, VSS except hypertensive, No leukocytosis, CRP wnl, ESR elevated to 32. Clinically, wound probes to bone with purulence. High concern of OM. Pt not amneable for amputation and wants to go for long term IV abx. pt is s/p Bone Biopsy 1/26. MR reviewed. Bone biopsy Cx growing  Staphylococcus aureus and Staphylococcus pettenkoferi    Plan:  -c/w IV abx   -f/u ID recs  -f/u BB Cx/Path  -Pt will need 6 weeks of IV abx  -pt will need VNS service upon dc  -foot dressed with betadine DSD  -WBAT  -rest of care upto primary team    Podiatry to follow, plan d/w attending

## 2025-01-28 NOTE — PROGRESS NOTE ADULT - ASSESSMENT
This is a 38yo F PMH of IDDM and asthma p/w acute purulence of chronic R hallux wound FTH PTB (+) admitted to [facility] for workup of osteomyelitis.

## 2025-01-28 NOTE — PROGRESS NOTE ADULT - PROBLEM SELECTOR PLAN 7
F s/p 1L NS  E: Replete PRN  N: Diet, consistent carbs   DVT ppx: lovenox  GI ppx: not indicated  Bowel: not indicated  Dispo: RMF    FULL CODE

## 2025-01-28 NOTE — PROGRESS NOTE ADULT - ASSESSMENT
39 y.o female w/ IDDM, hx L great toe osteomyelitis s/p amputation, hx of R great toe OM s/p antibiotics in past who presents with R hallux ulcer x 3 months, draining purulent fluid x few weeks found to have OM of R 1st distal phalanx on MRI. Pt afebrile without leukocytosis. Wound culture growing MSSA and strep intermedius. Bcxs ngtd 48 hours. S/p bone biopsy 1/26 -- growing staph aureus and staph pettenkoferi. Patient will need 6 weeks of IV antibiotics.     Suggest:  -f/u bcxs   -f/u wound culture   -f/u bone bx culture and path   -stop vanc and zosyn   -start cefazolin 2g IV Q8  -start Flagyl 500 mg PO Q12     Team 2 will follow you.    Case d/w primary team.  Final recommendation pending attending note.    Ofelia Patel, Infectious Diseases PA  Please reach out for any questions 9 am-5pm.   For evenings and weekends, please call the ID physician on call.

## 2025-01-28 NOTE — PROGRESS NOTE ADULT - SUBJECTIVE AND OBJECTIVE BOX
OVERNIGHT EVENTS:    SUBJECTIVE / INTERVAL HPI: Patient seen and examined at bedside.     VITAL SIGNS:  Vital Signs Last 24 Hrs  T(C): 36.4 (28 Jan 2025 05:31), Max: 37 (27 Jan 2025 11:56)  T(F): 97.6 (28 Jan 2025 05:31), Max: 98.6 (27 Jan 2025 11:56)  HR: 68 (28 Jan 2025 05:31) (66 - 77)  BP: 162/94 (28 Jan 2025 05:31) (153/81 - 162/94)  BP(mean): 113 (27 Jan 2025 21:33) (113 - 113)  RR: 17 (28 Jan 2025 05:31) (16 - 17)  SpO2: 99% (28 Jan 2025 05:31) (99% - 99%)    Parameters below as of 28 Jan 2025 05:31  Patient On (Oxygen Delivery Method): room air      I&O's Summary    26 Jan 2025 07:01  -  27 Jan 2025 07:00  --------------------------------------------------------  IN: 350 mL / OUT: 0 mL / NET: 350 mL        PHYSICAL EXAM:    General: WDWN  HEENT: NC/AT; PERRL, anicteric sclera; MMM  Neck: supple  Cardiovascular: +S1/S2; RRR  Respiratory: CTA B/L; no W/R/R  Gastrointestinal: soft, NT/ND; +BSx4  Extremities: WWP; no edema, clubbing or cyanosis  Vascular: 2+ radial, DP/PT pulses B/L  Neurological: AAOx3; no focal deficits    MEDICATIONS:  MEDICATIONS  (STANDING):  acetaminophen     Tablet .. 650 milliGRAM(s) Oral every 6 hours  dextrose 5%. 1000 milliLiter(s) (100 mL/Hr) IV Continuous <Continuous>  dextrose 5%. 1000 milliLiter(s) (50 mL/Hr) IV Continuous <Continuous>  dextrose 50% Injectable 25 Gram(s) IV Push once  dextrose 50% Injectable 12.5 Gram(s) IV Push once  dextrose 50% Injectable 25 Gram(s) IV Push once  enoxaparin Injectable 40 milliGRAM(s) SubCutaneous every 24 hours  gabapentin 100 milliGRAM(s) Oral every 8 hours  glucagon  Injectable 1 milliGRAM(s) IntraMuscular once  influenza   Vaccine 0.5 milliLiter(s) IntraMuscular once  insulin lispro (ADMELOG) corrective regimen sliding scale   SubCutaneous Before meals and at bedtime  iron sucrose IVPB 400 milliGRAM(s) IV Intermittent every 24 hours  lidocaine   4% Patch 1 Patch Transdermal every 24 hours  piperacillin/tazobactam IVPB.. 4.5 Gram(s) IV Intermittent every 8 hours  vancomycin  IVPB 1250 milliGRAM(s) IV Intermittent every 12 hours    MEDICATIONS  (PRN):  albuterol    0.083% 2.5 milliGRAM(s) Nebulizer every 6 hours PRN Wheezing  albuterol    90 MICROgram(s) HFA Inhaler 1 Puff(s) Inhalation every 4 hours PRN Wheezing  dextrose Oral Gel 15 Gram(s) Oral once PRN Blood Glucose LESS THAN 70 milliGRAM(s)/deciliter  oxyCODONE    IR 2.5 milliGRAM(s) Oral every 6 hours PRN Severe Pain (7 - 10)      ALLERGIES:  Allergies    shellfish (Anaphylaxis)  No Known Drug Allergies    Intolerances        LABS:                        7.6    5.19  )-----------( 206      ( 27 Jan 2025 10:00 )             24.2     01-26    139  |  112[H]  |  14  ----------------------------<  121[H]  4.0   |  18[L]  |  0.54    Ca    8.3[L]      26 Jan 2025 07:38  Phos  2.5     01-26  Mg     1.9     01-26        Urinalysis Basic - ( 26 Jan 2025 07:38 )    Color: x / Appearance: x / SG: x / pH: x  Gluc: 121 mg/dL / Ketone: x  / Bili: x / Urobili: x   Blood: x / Protein: x / Nitrite: x   Leuk Esterase: x / RBC: x / WBC x   Sq Epi: x / Non Sq Epi: x / Bacteria: x      CAPILLARY BLOOD GLUCOSE      POCT Blood Glucose.: 159 mg/dL (27 Jan 2025 22:41)      RADIOLOGY & ADDITIONAL TESTS: Reviewed.   OVERNIGHT EVENTS: Consuelo    SUBJECTIVE / INTERVAL HPI: Patient seen and examined at bedside. Pt reports she is feeling better this morning. Denies active purulence or drainage from toe. Complains of a mild headache which improved after Motrin. Otherwise, denies CP, SOB, abdominal pain, nausea, vomiting, dizziness. lightheadedness.     VITAL SIGNS:  Vital Signs Last 24 Hrs  T(C): 36.4 (28 Jan 2025 05:31), Max: 37 (27 Jan 2025 11:56)  T(F): 97.6 (28 Jan 2025 05:31), Max: 98.6 (27 Jan 2025 11:56)  HR: 68 (28 Jan 2025 05:31) (66 - 77)  BP: 162/94 (28 Jan 2025 05:31) (153/81 - 162/94)  BP(mean): 113 (27 Jan 2025 21:33) (113 - 113)  RR: 17 (28 Jan 2025 05:31) (16 - 17)  SpO2: 99% (28 Jan 2025 05:31) (99% - 99%)    Parameters below as of 28 Jan 2025 05:31  Patient On (Oxygen Delivery Method): room air      I&O's Summary    26 Jan 2025 07:01  -  27 Jan 2025 07:00  --------------------------------------------------------  IN: 350 mL / OUT: 0 mL / NET: 350 mL        PHYSICAL EXAM:    General: resting comfortably, in no acute distress  HEENT: NC/AT; PERRL, anicteric sclera; MMM  Neck: supple  Cardiovascular: +S1/S2; RRR  Respiratory: CTA B/L; no W/R/R  Gastrointestinal: soft, NT/ND; +BSx4  Extremities: WWP; no edema, clubbing or cyanosis  MSK: R halux with small ulcer, wrapped with gauze, with swelling and tenderness to palpation around the toe and medial metatarsal  Neurological: AAOx3; no focal deficits    MEDICATIONS:  MEDICATIONS  (STANDING):  acetaminophen     Tablet .. 650 milliGRAM(s) Oral every 6 hours  dextrose 5%. 1000 milliLiter(s) (100 mL/Hr) IV Continuous <Continuous>  dextrose 5%. 1000 milliLiter(s) (50 mL/Hr) IV Continuous <Continuous>  dextrose 50% Injectable 25 Gram(s) IV Push once  dextrose 50% Injectable 12.5 Gram(s) IV Push once  dextrose 50% Injectable 25 Gram(s) IV Push once  enoxaparin Injectable 40 milliGRAM(s) SubCutaneous every 24 hours  gabapentin 100 milliGRAM(s) Oral every 8 hours  glucagon  Injectable 1 milliGRAM(s) IntraMuscular once  influenza   Vaccine 0.5 milliLiter(s) IntraMuscular once  insulin lispro (ADMELOG) corrective regimen sliding scale   SubCutaneous Before meals and at bedtime  iron sucrose IVPB 400 milliGRAM(s) IV Intermittent every 24 hours  lidocaine   4% Patch 1 Patch Transdermal every 24 hours  piperacillin/tazobactam IVPB.. 4.5 Gram(s) IV Intermittent every 8 hours  vancomycin  IVPB 1250 milliGRAM(s) IV Intermittent every 12 hours    MEDICATIONS  (PRN):  albuterol    0.083% 2.5 milliGRAM(s) Nebulizer every 6 hours PRN Wheezing  albuterol    90 MICROgram(s) HFA Inhaler 1 Puff(s) Inhalation every 4 hours PRN Wheezing  dextrose Oral Gel 15 Gram(s) Oral once PRN Blood Glucose LESS THAN 70 milliGRAM(s)/deciliter  oxyCODONE    IR 2.5 milliGRAM(s) Oral every 6 hours PRN Severe Pain (7 - 10)      ALLERGIES:  Allergies    shellfish (Anaphylaxis)  No Known Drug Allergies    Intolerances        LABS:                        7.6    5.19  )-----------( 206      ( 27 Jan 2025 10:00 )             24.2     01-26    139  |  112[H]  |  14  ----------------------------<  121[H]  4.0   |  18[L]  |  0.54    Ca    8.3[L]      26 Jan 2025 07:38  Phos  2.5     01-26  Mg     1.9     01-26        Urinalysis Basic - ( 26 Jan 2025 07:38 )    Color: x / Appearance: x / SG: x / pH: x  Gluc: 121 mg/dL / Ketone: x  / Bili: x / Urobili: x   Blood: x / Protein: x / Nitrite: x   Leuk Esterase: x / RBC: x / WBC x   Sq Epi: x / Non Sq Epi: x / Bacteria: x      CAPILLARY BLOOD GLUCOSE      POCT Blood Glucose.: 159 mg/dL (27 Jan 2025 22:41)      RADIOLOGY & ADDITIONAL TESTS: Reviewed.

## 2025-01-28 NOTE — PROGRESS NOTE ADULT - SUBJECTIVE AND OBJECTIVE BOX
INFECTIOUS DISEASES CONSULT FOLLOW-UP NOTE    INTERVAL HPI/OVERNIGHT EVENTS:    Patient seen and examined at bedside. JENNI. Afebrile       ROS:   Constitutional, eyes, ENT, cardiovascular, respiratory, gastrointestinal, genitourinary, integumentary, neurological, psychiatric and heme/lymph are otherwise negative other than noted above       ANTIBIOTICS/RELEVANT:    MEDICATIONS  (STANDING):  acetaminophen     Tablet .. 650 milliGRAM(s) Oral every 6 hours  ceFAZolin   IVPB 2000 milliGRAM(s) IV Intermittent every 8 hours  dextrose 5%. 1000 milliLiter(s) (50 mL/Hr) IV Continuous <Continuous>  dextrose 5%. 1000 milliLiter(s) (100 mL/Hr) IV Continuous <Continuous>  dextrose 50% Injectable 25 Gram(s) IV Push once  dextrose 50% Injectable 12.5 Gram(s) IV Push once  dextrose 50% Injectable 25 Gram(s) IV Push once  enoxaparin Injectable 40 milliGRAM(s) SubCutaneous every 24 hours  gabapentin 100 milliGRAM(s) Oral every 8 hours  glucagon  Injectable 1 milliGRAM(s) IntraMuscular once  influenza   Vaccine 0.5 milliLiter(s) IntraMuscular once  insulin lispro (ADMELOG) corrective regimen sliding scale   SubCutaneous Before meals and at bedtime  iron sucrose IVPB 400 milliGRAM(s) IV Intermittent every 24 hours  lidocaine   4% Patch 1 Patch Transdermal every 24 hours  metroNIDAZOLE    Tablet 500 milliGRAM(s) Oral every 12 hours  polyethylene glycol 3350 17 Gram(s) Oral every 24 hours    MEDICATIONS  (PRN):  albuterol    0.083% 2.5 milliGRAM(s) Nebulizer every 6 hours PRN Wheezing  albuterol    90 MICROgram(s) HFA Inhaler 1 Puff(s) Inhalation every 4 hours PRN Wheezing  dextrose Oral Gel 15 Gram(s) Oral once PRN Blood Glucose LESS THAN 70 milliGRAM(s)/deciliter  ibuprofen  Tablet. 400 milliGRAM(s) Oral every 8 hours PRN Mild Pain (1 - 3), Moderate Pain (4 - 6)  oxyCODONE    IR 2.5 milliGRAM(s) Oral every 6 hours PRN Severe Pain (7 - 10)        Vital Signs Last 24 Hrs  T(C): 36.4 (28 Jan 2025 05:31), Max: 37 (27 Jan 2025 11:56)  T(F): 97.6 (28 Jan 2025 05:31), Max: 98.6 (27 Jan 2025 11:56)  HR: 68 (28 Jan 2025 05:31) (66 - 77)  BP: 162/94 (28 Jan 2025 05:31) (153/81 - 162/94)  BP(mean): 113 (27 Jan 2025 21:33) (113 - 113)  RR: 17 (28 Jan 2025 05:31) (16 - 17)  SpO2: 99% (28 Jan 2025 05:31) (99% - 99%)    Parameters below as of 28 Jan 2025 05:31  Patient On (Oxygen Delivery Method): room air        01-27-25 @ 07:01  -  01-28-25 @ 07:00  --------------------------------------------------------  IN: 100 mL / OUT: 0 mL / NET: 100 mL      PHYSICAL EXAM:  Constitutional: alert, NAD  Eyes: the sclera and conjunctiva were normal.   ENT: the ears and nose were normal in appearance.   Neck: the appearance of the neck was normal and the neck was supple.   Pulmonary: no respiratory distress and symmetric chest rise   Vascular:. there was no peripheral edema  Abdomen: soft, non-tender  Neurological: no focal deficits.   Psychiatric: the affect was normal  R foot dressing c/d/i.     LABS:                        7.9    4.59  )-----------( 211      ( 28 Jan 2025 05:30 )             25.8     01-28    140  |  112[H]  |  15  ----------------------------<  109[H]  3.9   |  19[L]  |  0.62    Ca    8.0[L]      28 Jan 2025 05:30  Phos  2.7     01-28  Mg     1.9     01-28    TPro  5.9[L]  /  Alb  3.7  /  TBili  0.2  /  DBili  x   /  AST  18  /  ALT  21  /  AlkPhos  106  01-28      Urinalysis Basic - ( 28 Jan 2025 05:30 )    Color: x / Appearance: x / SG: x / pH: x  Gluc: 109 mg/dL / Ketone: x  / Bili: x / Urobili: x   Blood: x / Protein: x / Nitrite: x   Leuk Esterase: x / RBC: x / WBC x   Sq Epi: x / Non Sq Epi: x / Bacteria: x        MICROBIOLOGY:    Culture - Tissue with Gram Stain (collected 01-26-25 @ 12:50)  Source: Tissue  Gram Stain (01-27-25 @ 22:11):    No polymorphonuclear cells seen per low power field    No organisms seen per oil power field  Preliminary Report (01-27-25 @ 22:11):    Rare Staphylococcus aureus    Few Staphylococcus pettenkoferi    Culture - Wound Aerobic/Anaerobic (collected 01-25-25 @ 12:36)  Source: Skin/Wound  Preliminary Report (01-27-25 @ 18:50):    Moderate Staphylococcus aureus    Numerous Streptococcus intermedius "Susceptibilities not performed"  Organism: Staphylococcus aureus (01-27-25 @ 16:29)  Organism: Staphylococcus aureus (01-27-25 @ 16:29)      Method Type: JEFF      -  Clindamycin: S <=0.25      -  Erythromycin: S <=0.25      -  Gentamicin: S <=4 Should not be used as monotherapy      -  Oxacillin: S 0.5 Oxacillin predicts susceptibility for dicloxacillin, methicillin, and nafcillin      -  Penicillin: R >2      -  Rifampin: S <=1 Should not be used as monotherapy      -  Tetracycline: S <=4      -  Trimethoprim/Sulfamethoxazole: S <=0.5/9.5      -  Vancomycin: S 1    Culture - Blood (collected 01-25-25 @ 12:36)  Source: .Blood BLOOD  Preliminary Report (01-27-25 @ 22:02):    No growth at 48 Hours    Culture - Blood (collected 01-25-25 @ 12:36)  Source: .Blood BLOOD  Preliminary Report (01-27-25 @ 22:02):    No growth at 48 Hours        RADIOLOGY & ADDITIONAL STUDIES:  Reviewed

## 2025-01-28 NOTE — PROGRESS NOTE ADULT - PROBLEM SELECTOR PLAN 1
p/w diabetic foot ulcer w/ (+) PTB testing in ED per podiatry    R foot XR wet read negative for OM per podiatry, no gas tracking  Low concern for cellulitis of overlying soft tissue, no zara erythema or swelling   X ray toes, R foot: No acute fracture or dislocation. Soft tissue ulceration of the distal first phalanx. No radiographic evidence of osteomyelitis.  S/p bone biopsy yesterday, vanc and zosyn were resumed after biopsy  MR right foot +osteomyelitis  -ID consulted- plans to continue Zosyn 4.5 g, vanc 1250 mg q12h, vanc trough 1/28 AM  - Podiatry consulted, appreciate recs   -Pending RLE arterial doppler, RLE venous doppler p/w diabetic foot ulcer w/ (+) PTB testing in ED per podiatry    R foot XR wet read negative for OM per podiatry, no gas tracking  Low concern for cellulitis of overlying soft tissue, no zara erythema or swelling   X ray toes, R foot: No acute fracture or dislocation. Soft tissue ulceration of the distal first phalanx. No radiographic evidence of osteomyelitis.  S/p bone biopsy yesterday, vanc and zosyn were resumed after biopsy  MR right foot +osteomyelitis  RLE arterial doppler, RLE venous doppler negative  -ID consulted- s/p vanc, zosyn transition to cefazolin and metro  - Podiatry consulted, appreciate recs

## 2025-01-29 VITALS
OXYGEN SATURATION: 99 % | HEART RATE: 74 BPM | TEMPERATURE: 98 F | SYSTOLIC BLOOD PRESSURE: 149 MMHG | DIASTOLIC BLOOD PRESSURE: 79 MMHG | RESPIRATION RATE: 17 BRPM

## 2025-01-29 PROCEDURE — 80048 BASIC METABOLIC PNL TOTAL CA: CPT

## 2025-01-29 PROCEDURE — G0545: CPT

## 2025-01-29 PROCEDURE — 87040 BLOOD CULTURE FOR BACTERIA: CPT

## 2025-01-29 PROCEDURE — 87075 CULTR BACTERIA EXCEPT BLOOD: CPT

## 2025-01-29 PROCEDURE — 87077 CULTURE AEROBIC IDENTIFY: CPT

## 2025-01-29 PROCEDURE — 93971 EXTREMITY STUDY: CPT

## 2025-01-29 PROCEDURE — 93005 ELECTROCARDIOGRAM TRACING: CPT

## 2025-01-29 PROCEDURE — 87070 CULTURE OTHR SPECIMN AEROBIC: CPT

## 2025-01-29 PROCEDURE — 80202 ASSAY OF VANCOMYCIN: CPT

## 2025-01-29 PROCEDURE — 86900 BLOOD TYPING SEROLOGIC ABO: CPT

## 2025-01-29 PROCEDURE — 83550 IRON BINDING TEST: CPT

## 2025-01-29 PROCEDURE — 80053 COMPREHEN METABOLIC PANEL: CPT

## 2025-01-29 PROCEDURE — 86140 C-REACTIVE PROTEIN: CPT

## 2025-01-29 PROCEDURE — A9585: CPT

## 2025-01-29 PROCEDURE — 99285 EMERGENCY DEPT VISIT HI MDM: CPT

## 2025-01-29 PROCEDURE — 83735 ASSAY OF MAGNESIUM: CPT

## 2025-01-29 PROCEDURE — 73720 MRI LWR EXTREMITY W/O&W/DYE: CPT | Mod: MC

## 2025-01-29 PROCEDURE — 85610 PROTHROMBIN TIME: CPT

## 2025-01-29 PROCEDURE — 93926 LOWER EXTREMITY STUDY: CPT

## 2025-01-29 PROCEDURE — 36415 COLL VENOUS BLD VENIPUNCTURE: CPT

## 2025-01-29 PROCEDURE — 36573 INSJ PICC RS&I 5 YR+: CPT

## 2025-01-29 PROCEDURE — 83605 ASSAY OF LACTIC ACID: CPT

## 2025-01-29 PROCEDURE — 88311 DECALCIFY TISSUE: CPT

## 2025-01-29 PROCEDURE — 73660 X-RAY EXAM OF TOE(S): CPT

## 2025-01-29 PROCEDURE — 36569 INSJ PICC 5 YR+ W/O IMAGING: CPT

## 2025-01-29 PROCEDURE — 99232 SBSQ HOSP IP/OBS MODERATE 35: CPT

## 2025-01-29 PROCEDURE — 82977 ASSAY OF GGT: CPT

## 2025-01-29 PROCEDURE — 85652 RBC SED RATE AUTOMATED: CPT

## 2025-01-29 PROCEDURE — 82962 GLUCOSE BLOOD TEST: CPT

## 2025-01-29 PROCEDURE — 85027 COMPLETE CBC AUTOMATED: CPT

## 2025-01-29 PROCEDURE — 85730 THROMBOPLASTIN TIME PARTIAL: CPT

## 2025-01-29 PROCEDURE — 82728 ASSAY OF FERRITIN: CPT

## 2025-01-29 PROCEDURE — 86850 RBC ANTIBODY SCREEN: CPT

## 2025-01-29 PROCEDURE — 88307 TISSUE EXAM BY PATHOLOGIST: CPT

## 2025-01-29 PROCEDURE — 87186 SC STD MICRODIL/AGAR DIL: CPT

## 2025-01-29 PROCEDURE — 84100 ASSAY OF PHOSPHORUS: CPT

## 2025-01-29 PROCEDURE — 86901 BLOOD TYPING SEROLOGIC RH(D): CPT

## 2025-01-29 PROCEDURE — 99239 HOSP IP/OBS DSCHRG MGMT >30: CPT

## 2025-01-29 PROCEDURE — 83540 ASSAY OF IRON: CPT

## 2025-01-29 PROCEDURE — 85025 COMPLETE CBC W/AUTO DIFF WBC: CPT

## 2025-01-29 PROCEDURE — 83036 HEMOGLOBIN GLYCOSYLATED A1C: CPT

## 2025-01-29 RX ORDER — BACTERIOSTATIC SODIUM CHLORIDE 0.9 %
10 VIAL (ML) INJECTION
Refills: 0 | Status: DISCONTINUED | OUTPATIENT
Start: 2025-01-29 | End: 2025-01-29

## 2025-01-29 RX ORDER — CEFAZOLIN SODIUM IN 0.9 % NACL 2 G/10 ML
2 SYRINGE (ML) INTRAVENOUS
Qty: 0 | Refills: 0 | DISCHARGE
Start: 2025-01-29

## 2025-01-29 RX ORDER — ANTISEPTIC SURGICAL SCRUB 0.04 MG/ML
1 SOLUTION TOPICAL
Refills: 0 | Status: DISCONTINUED | OUTPATIENT
Start: 2025-01-29 | End: 2025-01-29

## 2025-01-29 RX ORDER — GABAPENTIN 800 MG/1
1 TABLET ORAL
Qty: 42 | Refills: 0
Start: 2025-01-29 | End: 2025-02-11

## 2025-01-29 RX ORDER — METRONIDAZOLE 500 MG
1 TABLET ORAL
Qty: 28 | Refills: 0
Start: 2025-01-29 | End: 2025-02-11

## 2025-01-29 RX ORDER — IBUPROFEN 600 MG/1
400 TABLET, FILM COATED ORAL EVERY 6 HOURS
Refills: 0 | Status: DISCONTINUED | OUTPATIENT
Start: 2025-01-29 | End: 2025-01-29

## 2025-01-29 RX ADMIN — IBUPROFEN 400 MILLIGRAM(S): 600 TABLET, FILM COATED ORAL at 07:45

## 2025-01-29 RX ADMIN — IBUPROFEN 400 MILLIGRAM(S): 600 TABLET, FILM COATED ORAL at 01:03

## 2025-01-29 RX ADMIN — IBUPROFEN 400 MILLIGRAM(S): 600 TABLET, FILM COATED ORAL at 13:50

## 2025-01-29 RX ADMIN — IBUPROFEN 400 MILLIGRAM(S): 600 TABLET, FILM COATED ORAL at 12:50

## 2025-01-29 RX ADMIN — GABAPENTIN 100 MILLIGRAM(S): 800 TABLET ORAL at 06:01

## 2025-01-29 RX ADMIN — Medication 500 MILLIGRAM(S): at 06:01

## 2025-01-29 RX ADMIN — Medication 1: at 09:20

## 2025-01-29 RX ADMIN — IBUPROFEN 400 MILLIGRAM(S): 600 TABLET, FILM COATED ORAL at 02:03

## 2025-01-29 RX ADMIN — Medication 100 MILLIGRAM(S): at 06:01

## 2025-01-29 RX ADMIN — Medication 100 MILLIGRAM(S): at 14:07

## 2025-01-29 RX ADMIN — GABAPENTIN 100 MILLIGRAM(S): 800 TABLET ORAL at 14:07

## 2025-01-29 NOTE — PROGRESS NOTE ADULT - ATTENDING COMMENTS
Patient was seen and examined at bedside on 1/26/2025 at 3 pm with family present. Patient reports that she has continued pain at her RLE. Denies SOB, CP. ROS is otherwise negative. Vitals, labwork and pertinent imaging reviewed. Exam - NAD, AAO x 4, PERRLA, EOMI, MMM, supple neck, chest - CTA b/l, CV - rrr, s1s2, no m/r/g, no JVD, abd - soft, NTND, + BS, back - midline, ext - wwp, RLE edema, erythema, wrapped, psych - normal affect, skin - no rash    Plan:  -C/w broad empiric abx  -MRI  -Podiatry has performed a bone biopsy  -Check RLE arterial doppler, RLE venous doppler  -Pain control
39F with a PMH of DM (unclear whether type 1 or 2), asthma, who presented with purulence of a chronic R big toe wound, found to be + probe to bone, admitted for management of osteomyelitis.     VS reviewed and stable on RA     Exam:   Appears comfortable   MMM  Normal WOB on RA, CTAB   RRR, no mrg   Abdomen soft, nontender, nondistended  Extremities warm and without edema, R foot wrapped in clean gauze dressing   AOX3, no focal neuro deficits     Labs reviewed   CBC stable this AM   BMP also stable this AM, Cr at baseline   Blood cultures NGTD   Wound culture growing moderate staph aureus, staph pettenkoferi   Bone biopsy pending     MR reviewed, with evidence of OM     A/P:   -Diabetic ulcer of R foot, osteomyelitis of the R 1st distal phalanx: Appreciate ID recommendations, switched to cefazolin/flagyl today. Continue to follow bone, wound, blood cultures.   -Diabetes: continue ISS   -Neuropathy: trialing gabapentin 100 mg TID   -Iron deficiency anemia: continue IV iron today     Rest as per resident note.
39F with a PMH of DM (unclear whether type 1 or 2), asthma, who presented with purulence of a chronic R big toe wound, found to be + probe to bone, admitted for management of osteomyelitis.     VS reviewed and stable on RA     Exam:   Appears comfortable   MMM  Normal WOB on RA, CTAB   RRR, no mrg   Abdomen soft, nontender, nondistended  Extremities warm and without edema, R foot wrapped in clean gauze dressing   AOX3, no focal neuro deficits     Labs reviewed   CBC stable this AM   Iron deficient   Blood cultures NGTD   Wound culture growing moderate staph aureus   Bone biopsy pending     MR reviewed, with evidence of OM     A/P:   -Diabetic ulcer of R foot, osteomyelitis of the R 1st distal phalanx: Continue vancomycin/zosyn for broad coverage, ID consult today. Check RLE arterial and venous dopplers. Follow up blood, bone, wound cultures.   -Diabetes: continue ISS   -Neuropathy: trialing gabapentin 100 mg TID   -Iron deficiency anemia: start IV iron course while admitted     Rest as per resident note.
39F with a PMH of DM (unclear whether type 1 or 2), asthma, who presented with purulence of a chronic R big toe wound, found to be + probe to bone, admitted for management of osteomyelitis.     VS reviewed and stable on RA     Exam:   Appears comfortable   MMM  Normal WOB on RA, CTAB   RRR, no mrg   Abdomen soft, nontender, nondistended  Extremities warm and without edema, R foot wrapped in clean gauze dressing   AOX3, no focal neuro deficits     Labs reviewed   None new this AM     MR reviewed, with evidence of OM     A/P:   -Diabetic ulcer of R foot, osteomyelitis of the R 1st distal phalanx: Appreciate ID recommendations, plan for cefazolin/flagyl on discharge. PICC to be placed today for home infusion services.   -Diabetes: continue ISS   -Neuropathy: trialing gabapentin 100 mg TID   -Iron deficiency anemia: continue IV iron today, can transition to PO iron on discharge     Rest as per resident note.

## 2025-01-29 NOTE — PROGRESS NOTE ADULT - ASSESSMENT
38 yo F PMH DM reporting worsening ulcer to the right great toe in the toe pad. Podiatry consulted for evaluation of wound. At the time of consult, VSS except hypertensive, No leukocytosis, CRP wnl, ESR elevated to 32. Clinically, wound probes to bone with purulence. High concern of OM. Pt not amneable for amputation and wants to go for long term IV abx. pt is s/p Bone Biopsy 1/26. MR reviewed. Bone biopsy Cx growing  Staphylococcus aureus and Staphylococcus pettenkoferi    Plan:  -c/w IV abx   -f/u ID recs  -f/u PICC  -Pt will need 6 weeks of IV abx  -pt will need VNS service upon dc  -foot dressed with betadine DSD  -WBAT  -rest of care upto primary team    Podiatry to follow, plan d/w attending    Patient should follow up for continued podiatric care with Pippa Perez DPM within 1-2 weeks of discharge.   [address] [id number]  [phone]

## 2025-01-29 NOTE — PROGRESS NOTE ADULT - NS ATTEND AMEND GEN_ALL_CORE FT
39F with hx superobesity s/p duodenal switch w/ sleeve gastrectomy (2021) and DM (A1c 6.4%) who presented on 1/25 with a several day history of increased swelling/purulent drainage from chronic distal R hallux wound and subjective fevers. On podiatry’s exam there was wound at distal tip of R hallux with +PTB and active purulent drainage. MRI R foot w/wo IV contrast with OM of distal first phalanx. Received a dose of vanc/zoysn in the ED and then abx held until she underwent bone biopsy on 1/26 after which time vanc/zosyn was resumed. Wound cx from 1/25 with MSSA and S.intermedius. Bone bx cx from 1/26 with S.aureus and S.pettenkoferi (CoNS likely a contaminant). Pt declined amputation by podiatry. ID consulted to assist with abx management. Stop vanc/zosyn, start ancef and empiric flagyl. Anticipate 6 week course of ancef + 2 weeks of empiric PO flagyl.
39F with hx superobesity s/p duodenal switch w/ sleeve gastrectomy (2021) and DM (A1c 6.4%) who presented on 1/25 with a several day history of increased swelling/purulent drainage from chronic distal R hallux wound and subjective fevers. On podiatry’s exam there was wound at distal tip of R hallux with +PTB and active purulent drainage. MRI R foot w/wo IV contrast with OM of distal first phalanx. Received a dose of vanc/zoysn in the ED and then abx held until she underwent bone biopsy on 1/26 after which time vanc/zosyn was resumed. Wound cx from 1/25 with MSSA and S.intermedius. Bone bx cx from 1/26 with S.aureus and S.pettenkoferi (CoNS likely a contaminant). Pt declined amputation by podiatry. Continue 6 week course of ancef + 2 weeks of empiric PO flagyl.

## 2025-01-29 NOTE — PROGRESS NOTE ADULT - PROBLEM SELECTOR PROBLEM 1
Diabetic ulcer of right foot

## 2025-01-29 NOTE — PROCEDURE NOTE - NSPOSTPRCRAD_GEN_A_CORE
tip visualized in ra-svc junction; 41cm/depth of insertion/line adjusted to depth of insertion/line in appropriate postion/postion of catheter/ultrasound

## 2025-01-29 NOTE — PROGRESS NOTE ADULT - PROBLEM SELECTOR PLAN 1
p/w diabetic foot ulcer w/ (+) PTB testing in ED per podiatry    R foot XR wet read negative for OM per podiatry, no gas tracking  Low concern for cellulitis of overlying soft tissue, no zara erythema or swelling   X ray toes, R foot: No acute fracture or dislocation. Soft tissue ulceration of the distal first phalanx. No radiographic evidence of osteomyelitis.  S/p bone biopsy yesterday, vanc and zosyn were resumed after biopsy  MR right foot +osteomyelitis  RLE arterial doppler, RLE venous doppler negative  -ID consulted- s/p vanc, zosyn transition to cefazolin and metro  - Podiatry consulted, appreciate recs p/w diabetic foot ulcer w/ (+) PTB testing in ED per podiatry    R foot XR wet read negative for OM per podiatry, no gas tracking  Low concern for cellulitis of overlying soft tissue, no zara erythema or swelling   X ray toes, R foot: No acute fracture or dislocation. Soft tissue ulceration of the distal first phalanx. No radiographic evidence of osteomyelitis.  S/p bone biopsy, vanc and zosyn were resumed after biopsy  MR right foot +osteomyelitis, Tissue culture 1/26/25: rare Staph aureus, few staph pettenkoferi  RLE arterial doppler, RLE venous doppler negative  -ID consulted- s/p vanc, zosyn transition to cefazolin and metro --> Anticipate 6 week course of ancef + 2 weeks of empiric PO flagyl on discharge

## 2025-01-29 NOTE — PROCEDURAL SAFETY CHECKLIST WITH OR WITHOUT SEDATION - NSTEAMANESFT_GEN_ALL_CORE
05/13/19 1300   Team Members   Responding MD Vega   STAT RN Zelda Del Rosario   Additional Notes Primary responder   Rapid Response/ Neuro Alert   Alert Type Neuro Alert / Stroke   Location / Unit at time of call ED room P-5 at 1125   Team Arrival 1130   End Time 1300   Primary Reason for Call Suspected Stroke, signs and symptoms   Call initiated by Caregiver   Interventions During Call CT Head   Stroke Alert Information:   Date of Call: 5/13/2019  Admission Source:    via Privately Owned Vehicle   Stroke Call Location:(P) ED room P-5 at 1125 (05/13/19 1300)  Team Arrival:(P) 1130 (05/13/19 1300)      Stroke Assessments and Interventions  -Last Known Well Date: 05/10/19 at Last Known Well Time: 2200   -Initial NIHSS Score:  NIHSS Score: 0 see scale items below.    -Patient’s dysphagia screening score: Pass (05/13/19 1200)  -Speech therapy contacted No  -CTOH results (Initial Head CT Result No acute findings (05/13/19 1230)  -Premorbid Modified [place]: :Moderate disability: requiring some help, but able to walk without assistance (05/13/19 1405)  SNOW Scale    1. Gaze Deviation  No    2. Global or Expressive Aphasia  No    3. One-Side Neglect  No      Stroke Intervention(s):  None (05/13/19 1405)    IV Alteplase Contraindications:  last known well time is greater that 4.5 hours. and symptoms minor and non focal.  Please consult on-call acute stroke neurologist if further stroke work-up is warranted.    Education:  Stroke Folder content provided, including the following materials: BE FAST magnet, Understanding Stroke Booklet, Risk Factor Assessment Form, Stroke and Neuro Rehabilitation Follow-up, [place] Stroke and Neurological Support Groups, Quitting Tobacco, QuitLine Brochure.   Stroke causes and different types of stroke discussed.  Signs and symptoms of stroke and when to call 911 were discussed. PMD f/u recommended after discharge.  Patient's uncontrollable risk factors are none .   Patient's controllable  risk factors   HTN, BMI, sedentary.  The following lifestyle modifications were recommended: reduce weight, start an exercise program with physician approval,.     Patient allowed to verbalize fears and concerns. Questions were encouraged and answered.    For any deterioration in status or urgent needs, page 22 and request the stroke RN. For any routine needs please call [phone].     Stroke Nurse Navigator to follow.    NIH Stroke Scale 0 (05/13/19 1200)    Assessment Interval  Initial   Level of Consciousness 0 Alert   LOC Questions 0 Answers both questions correctly   LOC Commands 0 Performs both tasks correctly   Best Gaze 0 Normal   Visual 0 No visual loss   Facial Palsy 0 Normal   Motor Arm-Left 0 No drift   Motor Arm-Right 0 No drift   Motor Leg-Left 0 No drift   Motor Leg-Right 0 No drift   Limb Ataxia 0 Absent   Sensory 0 Normal   Best Language 0 No aphasia   Dysarthria 0 Normal articulation   Extinction and Inattention 0 No abnormality   NIHSS Score 0                       TAMARA Sanchez

## 2025-01-29 NOTE — PROGRESS NOTE ADULT - PROBLEM SELECTOR PLAN 3
patient complaining of burning and tingling on BL lower extremities     - start gabapentin 100 TID patient complaining of burning and tingling on BL lower extremities   -gabapentin 100 TID

## 2025-01-29 NOTE — PROGRESS NOTE ADULT - ASSESSMENT
This is a 40yo F PMH of IDDM and asthma p/w acute purulence of chronic R hallux wound FTH PTB (+) admitted to [facility] for workup of osteomyelitis.

## 2025-01-29 NOTE — PROGRESS NOTE ADULT - SUBJECTIVE AND OBJECTIVE BOX
INFECTIOUS DISEASES CONSULT FOLLOW-UP NOTE    INTERVAL HPI/OVERNIGHT EVENTS:    Pt seen and examined at bedside. JENNI. Afebrile. Having slight nausea, denies vomiting. Also has headache - denies visual changes, neck pain or stiffness, improves with motrin.       ROS:   Constitutional, eyes, ENT, cardiovascular, respiratory, gastrointestinal, genitourinary, integumentary, neurological, psychiatric and heme/lymph are otherwise negative other than noted above       ANTIBIOTICS/RELEVANT:    MEDICATIONS  (STANDING):  acetaminophen     Tablet .. 650 milliGRAM(s) Oral every 6 hours  ceFAZolin   IVPB 2000 milliGRAM(s) IV Intermittent every 8 hours  dextrose 5%. 1000 milliLiter(s) (50 mL/Hr) IV Continuous <Continuous>  dextrose 5%. 1000 milliLiter(s) (100 mL/Hr) IV Continuous <Continuous>  dextrose 50% Injectable 25 Gram(s) IV Push once  dextrose 50% Injectable 12.5 Gram(s) IV Push once  dextrose 50% Injectable 25 Gram(s) IV Push once  enoxaparin Injectable 40 milliGRAM(s) SubCutaneous every 24 hours  gabapentin 100 milliGRAM(s) Oral every 8 hours  glucagon  Injectable 1 milliGRAM(s) IntraMuscular once  influenza   Vaccine 0.5 milliLiter(s) IntraMuscular once  insulin lispro (ADMELOG) corrective regimen sliding scale   SubCutaneous Before meals and at bedtime  iron sucrose IVPB 400 milliGRAM(s) IV Intermittent every 24 hours  lidocaine   4% Patch 1 Patch Transdermal every 24 hours  metroNIDAZOLE    Tablet 500 milliGRAM(s) Oral every 12 hours  polyethylene glycol 3350 17 Gram(s) Oral every 24 hours    MEDICATIONS  (PRN):  albuterol    0.083% 2.5 milliGRAM(s) Nebulizer every 6 hours PRN Wheezing  albuterol    90 MICROgram(s) HFA Inhaler 1 Puff(s) Inhalation every 4 hours PRN Wheezing  dextrose Oral Gel 15 Gram(s) Oral once PRN Blood Glucose LESS THAN 70 milliGRAM(s)/deciliter  ibuprofen  Tablet. 400 milliGRAM(s) Oral every 6 hours PRN Mild Pain (1 - 3), Moderate Pain (4 - 6)  oxyCODONE    IR 2.5 milliGRAM(s) Oral every 6 hours PRN Severe Pain (7 - 10)        Vital Signs Last 24 Hrs  T(C): 37.1 (29 Jan 2025 06:09), Max: 37.1 (28 Jan 2025 20:42)  T(F): 98.7 (29 Jan 2025 06:09), Max: 98.7 (28 Jan 2025 20:42)  HR: 64 (29 Jan 2025 06:09) (64 - 80)  BP: 157/86 (29 Jan 2025 06:09) (132/80 - 157/86)  BP(mean): 110 (29 Jan 2025 06:09) (110 - 110)  RR: 17 (29 Jan 2025 06:09) (17 - 18)  SpO2: 98% (29 Jan 2025 06:09) (98% - 98%)    Parameters below as of 29 Jan 2025 06:09  Patient On (Oxygen Delivery Method): room air        PHYSICAL EXAM:  Constitutional: alert, NAD  Eyes: the sclera and conjunctiva were normal.   ENT: the ears and nose were normal in appearance.   Neck: the appearance of the neck was normal and the neck was supple.   Pulmonary: no respiratory distress and symmetric chest rise   Vascular:. there was no peripheral edema  Abdomen: soft, non-tender  Neurological: no focal deficits.   Psychiatric: the affect was normal  R foot dressing c/d/i.         LABS:                        7.9    4.59  )-----------( 211      ( 28 Jan 2025 05:30 )             25.8     01-28    140  |  112[H]  |  15  ----------------------------<  109[H]  3.9   |  19[L]  |  0.62    Ca    8.0[L]      28 Jan 2025 05:30  Phos  2.7     01-28  Mg     1.9     01-28    TPro  5.9[L]  /  Alb  3.7  /  TBili  0.2  /  DBili  x   /  AST  18  /  ALT  21  /  AlkPhos  106  01-28      Urinalysis Basic - ( 28 Jan 2025 05:30 )    Color: x / Appearance: x / SG: x / pH: x  Gluc: 109 mg/dL / Ketone: x  / Bili: x / Urobili: x   Blood: x / Protein: x / Nitrite: x   Leuk Esterase: x / RBC: x / WBC x   Sq Epi: x / Non Sq Epi: x / Bacteria: x        MICROBIOLOGY:    Culture - Tissue with Gram Stain (collected 01-26-25 @ 12:50)  Source: Tissue  Gram Stain (01-27-25 @ 22:11):    No polymorphonuclear cells seen per low power field    No organisms seen per oil power field  Preliminary Report (01-27-25 @ 22:11):    Rare Staphylococcus aureus    Few Staphylococcus pettenkoferi  Organism: Staphylococcus aureus  Staphylococcus pettenkoferi (01-28-25 @ 23:39)  Organism: Staphylococcus pettenkoferi (01-28-25 @ 23:39)      -  Clindamycin: S <=0.25      -  Oxacillin: S <=0.25      -  Gentamicin: S <=4 Should not be used as monotherapy      -  Vancomycin: S 1      -  Tetracycline: S <=4      Method Type: JEFF      -  Rifampin: S <=1 Should not be used as monotherapy      -  Erythromycin: S <=0.25      -  Trimethoprim/Sulfamethoxazole: S <=0.5/9.5  Organism: Staphylococcus aureus (01-28-25 @ 20:46)      -  Clindamycin: S 0.5      -  Oxacillin: S 0.5 Oxacillin predicts susceptibility for dicloxacillin, methicillin, and nafcillin      -  Gentamicin: S <=4 Should not be used as monotherapy      -  Vancomycin: S 2      -  Tetracycline: S <=4      Method Type: JEFF      -  Penicillin: R >2      -  Rifampin: S <=1 Should not be used as monotherapy      -  Erythromycin: S <=0.25      -  Trimethoprim/Sulfamethoxazole: S <=0.5/9.5    Culture - Blood (collected 01-25-25 @ 12:36)  Source: .Blood BLOOD  Preliminary Report (01-28-25 @ 22:01):    No growth at 72 Hours    Culture - Blood (collected 01-25-25 @ 12:36)  Source: .Blood BLOOD  Preliminary Report (01-28-25 @ 22:01):    No growth at 72 Hours    Culture - Wound Aerobic/Anaerobic (collected 01-25-25 @ 12:36)  Source: Skin/Wound  Preliminary Report (01-27-25 @ 18:50):    Moderate Staphylococcus aureus    Numerous Streptococcus intermedius "Susceptibilities not performed"  Organism: Staphylococcus aureus (01-27-25 @ 16:29)  Organism: Staphylococcus aureus (01-27-25 @ 16:29)      -  Clindamycin: S <=0.25      -  Oxacillin: S 0.5 Oxacillin predicts susceptibility for dicloxacillin, methicillin, and nafcillin      -  Gentamicin: S <=4 Should not be used as monotherapy      -  Vancomycin: S 1      -  Tetracycline: S <=4      Method Type: JEFF      -  Penicillin: R >2      -  Rifampin: S <=1 Should not be used as monotherapy      -  Erythromycin: S <=0.25      -  Trimethoprim/Sulfamethoxazole: S <=0.5/9.5        RADIOLOGY & ADDITIONAL STUDIES:  Reviewed

## 2025-01-29 NOTE — PROCEDURE NOTE - NSSECUREBY_VASC_A_CORE
Plan: Wash face with CeraVe foaming cream, then apply ketoconazole cream QAM. Lather head and shoulders shampoo to scalp for 3-5 mins, then rinse. Alternate dandruff shampoo and ketoconazole shampoo every other day. Hydrocortisone 2.5% cream QHS PRN Otc Regimen: Head and shoulders, cerave cream Detail Level: Zone stabilization device

## 2025-01-29 NOTE — PROGRESS NOTE ADULT - PROBLEM SELECTOR PLAN 4
no signs or stigmata of bleeding. asymptomatic at this time. Hx bariatric surgery which may be affecting absorption  Total iron 29, %Iron sat 9  Ganzoni: 1329 mg total iron deficit  Plan:  -IV iron supplementation 400 mg, stop after 3 days no signs or stigmata of bleeding. asymptomatic at this time. Hx bariatric surgery which may be affecting absorption  Total iron 29, %Iron sat 9  Ganzoni: 1329 mg total iron deficit  Plan:  -IV iron supplementation 400 mg, stop after 3 days. plan to transition to PO on discharge and f/u outpatient

## 2025-01-29 NOTE — PROGRESS NOTE ADULT - SUBJECTIVE AND OBJECTIVE BOX
Patient is a 39y old  Female who presents with a chief complaint of diabetic foot ulcer (29 Jan 2025 06:35)      INTERVAL HPI/ OVERNIGHT EVENTS  pt seen bedside. Complains of pain to the foot. dressing C/D/I    LABS                        7.9    4.59  )-----------( 211      ( 28 Jan 2025 05:30 )             25.8     01-28    140  |  112[H]  |  15  ----------------------------<  109[H]  3.9   |  19[L]  |  0.62    Ca    8.0[L]      28 Jan 2025 05:30  Phos  2.7     01-28  Mg     1.9     01-28    TPro  5.9[L]  /  Alb  3.7  /  TBili  0.2  /  DBili  x   /  AST  18  /  ALT  21  /  AlkPhos  106  01-28        ICU Vital Signs Last 24 Hrs  T(C): 37.1 (29 Jan 2025 06:09), Max: 37.1 (28 Jan 2025 20:42)  T(F): 98.7 (29 Jan 2025 06:09), Max: 98.7 (28 Jan 2025 20:42)  HR: 64 (29 Jan 2025 06:09) (64 - 80)  BP: 157/86 (29 Jan 2025 06:09) (132/80 - 157/86)  BP(mean): 110 (29 Jan 2025 06:09) (110 - 110)  ABP: --  ABP(mean): --  RR: 17 (29 Jan 2025 06:09) (17 - 18)  SpO2: 98% (29 Jan 2025 06:09) (98% - 98%)    O2 Parameters below as of 29 Jan 2025 06:09  Patient On (Oxygen Delivery Method): room air            RADIOLOGY  < from: MR Foot w/wo IV Cont, Right (01.26.25 @ 19:39) >  IMPRESSION:  Osteomyelitis of the first distal phalanx.    --- End of Report ---    < end of copied text >    MICROBIOLOGY  Culture - Tissue with Gram Stain (01.26.25 @ 12:50)    Gram Stain:   No polymorphonuclear cells seen per low power field  No organisms seen per oil power field   -  Clindamycin: S 0.5   -  Clindamycin: S <=0.25   -  Erythromycin: S <=0.25   -  Erythromycin: S <=0.25   -  Gentamicin: S <=4 Should not be used as monotherapy   -  Gentamicin: S <=4 Should not be used as monotherapy   -  Oxacillin: S <=0.25   -  Oxacillin: S 0.5 Oxacillin predicts susceptibility for dicloxacillin, methicillin, and nafcillin   -  Penicillin: R >2   -  Rifampin: S <=1 Should not be used as monotherapy   -  Rifampin: S <=1 Should not be used as monotherapy   -  Tetracycline: S <=4   -  Tetracycline: S <=4   -  Trimethoprim/Sulfamethoxazole: S <=0.5/9.5   -  Trimethoprim/Sulfamethoxazole: S <=0.5/9.5   -  Vancomycin: S 1   -  Vancomycin: S 2   Specimen Source: Tissue   Culture Results:   Rare Staphylococcus aureus  Few Staphylococcus pettenkoferi   Organism Identification: Staphylococcus aureus  Staphylococcus pettenkoferi   Organism: Staphylococcus aureus   Organism: Staphylococcus pettenkoferi   Method Type: JEFF   Method Type: JEFF          PHYSICAL EXAM  Right Lower Extremity Focused:  Vasc: 2/4 PT and DP, CFT wnl, TG warm to warm, no erythema, edema to hallux  Derm: Open wound to distal tip of hallux, +PTB, Scant sanguinous-purulent drainage upon manual pressure, -malodor,- fluctuance  Neuro: Protective sensations intact  MSK: +TTP to hallux

## 2025-01-29 NOTE — PROGRESS NOTE ADULT - ASSESSMENT
39 y.o female w/ IDDM, hx L great toe osteomyelitis s/p amputation, hx of R great toe OM s/p antibiotics in past who presents with R hallux ulcer x 3 months, draining purulent fluid x few weeks found to have OM of R 1st distal phalanx on MRI. Pt afebrile without leukocytosis. Wound culture growing MSSA and strep intermedius. Bcxs ngtd 72 hours. S/p bone biopsy 1/26 -- growing MSSA and staph pettenkoferi. Patient will need 6 weeks of IV antibiotics.     Suggest:  -f/u bcxs   -f/u wound culture   -f/u bone bx culture   -continue cefazolin 2g IV Q8  -continue Flagyl 500 mg PO Q12   -Place PICC line   -Discharge patient with cefazolin (please specify via home pump for home infusions) and flagyl as above   -Duration of cefazolin is 6 weeks ( 1/26 - 3/8 )  -Duration of flagyl is 2 weeks for empiric coverage of anaerobes for SSTI ( 1/26 - 2/8 )  -Weekly labs: CMP, CBC with diff, ESR, CRP faxed to ID office at [phone]  -Patient to follow up with Dr. Pederson in 2 weeks ([address], [phone]), ID office will call patient to schedule       Team 2 will sign off. Thank you for your consultation   Please recall if further ID input is desired  Case d/w primary team.  Final recommendation pending attending note.    Ofelia Patel, Infectious Diseases PA  Please reach out for any questions 9 am-5pm.   For evenings and weekends, please call the ID physician on call.   39 y.o female w/ IDDM, hx L great toe osteomyelitis s/p amputation, hx of R great toe OM s/p antibiotics in past who presents with R hallux ulcer x 3 months, draining purulent fluid x few weeks found to have OM of R 1st distal phalanx on MRI. Pt afebrile without leukocytosis. Wound culture growing MSSA and strep intermedius. Bcxs ngtd 72 hours. S/p bone biopsy 1/26 -- growing MSSA and staph pettenkoferi. Patient will need 6 weeks of IV antibiotics.     Suggest:  -f/u bcxs   -f/u wound culture   -f/u bone bx culture   -continue cefazolin 2g IV Q8  -continue Flagyl 500 mg PO Q12   -Place PICC line   -Discharge patient with cefazolin and flagyl as above   -Duration of cefazolin is 6 weeks ( 1/26 - 3/8 )  -Duration of flagyl is 2 weeks for empiric coverage of anaerobes for SSTI ( 1/26 - 2/8 )  -Weekly labs: CMP, CBC with diff, ESR, CRP faxed to ID office at [phone]  -Patient to follow up with Dr. Pederson in 2 weeks ([address], [phone]), ID office will call patient to schedule       Team 2 will sign off. Thank you for your consultation   Please recall if further ID input is desired  Case d/w primary team.  Final recommendation pending attending note.    Ofelia Patel, Infectious Diseases PA  Please reach out for any questions 9 am-5pm.   For evenings and weekends, please call the ID physician on call.

## 2025-01-29 NOTE — PROGRESS NOTE ADULT - PROBLEM SELECTOR PLAN 7
F s/p 1L NS  E: Replete PRN  N: Diet, consistent carbs   DVT ppx: lovenox  GI ppx: not indicated  Bowel: not indicated  Dispo: RMF    FULL CODE F s/p 1L NS  E: Replete PRN  N: Diet, consistent carbs   DVT ppx: lovenox  GI ppx: not indicated  Bowel: Miralax  Dispo: RMF    FULL CODE

## 2025-01-29 NOTE — PROGRESS NOTE ADULT - SUBJECTIVE AND OBJECTIVE BOX
OVERNIGHT EVENTS:    SUBJECTIVE / INTERVAL HPI: Patient seen and examined at bedside.     VITAL SIGNS:  Vital Signs Last 24 Hrs  T(C): 37.1 (29 Jan 2025 06:09), Max: 37.1 (28 Jan 2025 20:42)  T(F): 98.7 (29 Jan 2025 06:09), Max: 98.7 (28 Jan 2025 20:42)  HR: 64 (29 Jan 2025 06:09) (64 - 80)  BP: 157/86 (29 Jan 2025 06:09) (132/80 - 157/86)  BP(mean): 110 (29 Jan 2025 06:09) (110 - 110)  RR: 17 (29 Jan 2025 06:09) (17 - 18)  SpO2: 98% (29 Jan 2025 06:09) (98% - 98%)    Parameters below as of 29 Jan 2025 06:09  Patient On (Oxygen Delivery Method): room air      I&O's Summary    27 Jan 2025 07:01  -  28 Jan 2025 07:00  --------------------------------------------------------  IN: 100 mL / OUT: 0 mL / NET: 100 mL        PHYSICAL EXAM:    General: WDWN  HEENT: NC/AT; PERRL, anicteric sclera; MMM  Neck: supple  Cardiovascular: +S1/S2; RRR  Respiratory: CTA B/L; no W/R/R  Gastrointestinal: soft, NT/ND; +BSx4  Extremities: WWP; no edema, clubbing or cyanosis  Vascular: 2+ radial, DP/PT pulses B/L  Neurological: AAOx3; no focal deficits    MEDICATIONS:  MEDICATIONS  (STANDING):  acetaminophen     Tablet .. 650 milliGRAM(s) Oral every 6 hours  ceFAZolin   IVPB 2000 milliGRAM(s) IV Intermittent every 8 hours  dextrose 5%. 1000 milliLiter(s) (100 mL/Hr) IV Continuous <Continuous>  dextrose 5%. 1000 milliLiter(s) (50 mL/Hr) IV Continuous <Continuous>  dextrose 50% Injectable 25 Gram(s) IV Push once  dextrose 50% Injectable 12.5 Gram(s) IV Push once  dextrose 50% Injectable 25 Gram(s) IV Push once  enoxaparin Injectable 40 milliGRAM(s) SubCutaneous every 24 hours  gabapentin 100 milliGRAM(s) Oral every 8 hours  glucagon  Injectable 1 milliGRAM(s) IntraMuscular once  influenza   Vaccine 0.5 milliLiter(s) IntraMuscular once  insulin lispro (ADMELOG) corrective regimen sliding scale   SubCutaneous Before meals and at bedtime  iron sucrose IVPB 400 milliGRAM(s) IV Intermittent every 24 hours  lidocaine   4% Patch 1 Patch Transdermal every 24 hours  metroNIDAZOLE    Tablet 500 milliGRAM(s) Oral every 12 hours  polyethylene glycol 3350 17 Gram(s) Oral every 24 hours    MEDICATIONS  (PRN):  albuterol    0.083% 2.5 milliGRAM(s) Nebulizer every 6 hours PRN Wheezing  albuterol    90 MICROgram(s) HFA Inhaler 1 Puff(s) Inhalation every 4 hours PRN Wheezing  dextrose Oral Gel 15 Gram(s) Oral once PRN Blood Glucose LESS THAN 70 milliGRAM(s)/deciliter  ibuprofen  Tablet. 400 milliGRAM(s) Oral every 6 hours PRN Mild Pain (1 - 3), Moderate Pain (4 - 6)  oxyCODONE    IR 2.5 milliGRAM(s) Oral every 6 hours PRN Severe Pain (7 - 10)      ALLERGIES:  Allergies    shellfish (Anaphylaxis)  No Known Drug Allergies    Intolerances        LABS:                        7.9    4.59  )-----------( 211      ( 28 Jan 2025 05:30 )             25.8     01-28    140  |  112[H]  |  15  ----------------------------<  109[H]  3.9   |  19[L]  |  0.62    Ca    8.0[L]      28 Jan 2025 05:30  Phos  2.7     01-28  Mg     1.9     01-28    TPro  5.9[L]  /  Alb  3.7  /  TBili  0.2  /  DBili  x   /  AST  18  /  ALT  21  /  AlkPhos  106  01-28      Urinalysis Basic - ( 28 Jan 2025 05:30 )    Color: x / Appearance: x / SG: x / pH: x  Gluc: 109 mg/dL / Ketone: x  / Bili: x / Urobili: x   Blood: x / Protein: x / Nitrite: x   Leuk Esterase: x / RBC: x / WBC x   Sq Epi: x / Non Sq Epi: x / Bacteria: x      CAPILLARY BLOOD GLUCOSE      POCT Blood Glucose.: 168 mg/dL (28 Jan 2025 21:54)      RADIOLOGY & ADDITIONAL TESTS: Reviewed.   OVERNIGHT EVENTS: Consuelo    SUBJECTIVE / INTERVAL HPI: Patient seen and examined at bedside. She reports improvement in her headache and foot pain this morning. No purulence or drainage from the foot noted. Otherwise, denies CP, SOB, abdominal pain, vomiting, fevers, chills.     VITAL SIGNS:  Vital Signs Last 24 Hrs  T(C): 37.1 (29 Jan 2025 06:09), Max: 37.1 (28 Jan 2025 20:42)  T(F): 98.7 (29 Jan 2025 06:09), Max: 98.7 (28 Jan 2025 20:42)  HR: 64 (29 Jan 2025 06:09) (64 - 80)  BP: 157/86 (29 Jan 2025 06:09) (132/80 - 157/86)  BP(mean): 110 (29 Jan 2025 06:09) (110 - 110)  RR: 17 (29 Jan 2025 06:09) (17 - 18)  SpO2: 98% (29 Jan 2025 06:09) (98% - 98%)    Parameters below as of 29 Jan 2025 06:09  Patient On (Oxygen Delivery Method): room air      I&O's Summary    27 Jan 2025 07:01  -  28 Jan 2025 07:00  --------------------------------------------------------  IN: 100 mL / OUT: 0 mL / NET: 100 mL        PHYSICAL EXAM:  General: resting comfortably, in no acute distress  HEENT: NC/AT; PERRL, anicteric sclera; MMM  Neck: supple  Cardiovascular: +S1/S2; RRR  Respiratory: CTA B/L; no W/R/R  Gastrointestinal: soft, NT/ND; +BSx4  Extremities: WWP; no edema, clubbing or cyanosis  MSK: R halux with small ulcer, wrapped with gauze, with swelling and tenderness to palpation around the toe and medial metatarsal  Neurological: AAOx3; no focal deficits    MEDICATIONS:  MEDICATIONS  (STANDING):  acetaminophen     Tablet .. 650 milliGRAM(s) Oral every 6 hours  ceFAZolin   IVPB 2000 milliGRAM(s) IV Intermittent every 8 hours  dextrose 5%. 1000 milliLiter(s) (100 mL/Hr) IV Continuous <Continuous>  dextrose 5%. 1000 milliLiter(s) (50 mL/Hr) IV Continuous <Continuous>  dextrose 50% Injectable 25 Gram(s) IV Push once  dextrose 50% Injectable 12.5 Gram(s) IV Push once  dextrose 50% Injectable 25 Gram(s) IV Push once  enoxaparin Injectable 40 milliGRAM(s) SubCutaneous every 24 hours  gabapentin 100 milliGRAM(s) Oral every 8 hours  glucagon  Injectable 1 milliGRAM(s) IntraMuscular once  influenza   Vaccine 0.5 milliLiter(s) IntraMuscular once  insulin lispro (ADMELOG) corrective regimen sliding scale   SubCutaneous Before meals and at bedtime  iron sucrose IVPB 400 milliGRAM(s) IV Intermittent every 24 hours  lidocaine   4% Patch 1 Patch Transdermal every 24 hours  metroNIDAZOLE    Tablet 500 milliGRAM(s) Oral every 12 hours  polyethylene glycol 3350 17 Gram(s) Oral every 24 hours    MEDICATIONS  (PRN):  albuterol    0.083% 2.5 milliGRAM(s) Nebulizer every 6 hours PRN Wheezing  albuterol    90 MICROgram(s) HFA Inhaler 1 Puff(s) Inhalation every 4 hours PRN Wheezing  dextrose Oral Gel 15 Gram(s) Oral once PRN Blood Glucose LESS THAN 70 milliGRAM(s)/deciliter  ibuprofen  Tablet. 400 milliGRAM(s) Oral every 6 hours PRN Mild Pain (1 - 3), Moderate Pain (4 - 6)  oxyCODONE    IR 2.5 milliGRAM(s) Oral every 6 hours PRN Severe Pain (7 - 10)      ALLERGIES:  Allergies    shellfish (Anaphylaxis)  No Known Drug Allergies    Intolerances        LABS:                        7.9    4.59  )-----------( 211      ( 28 Jan 2025 05:30 )             25.8     01-28    140  |  112[H]  |  15  ----------------------------<  109[H]  3.9   |  19[L]  |  0.62    Ca    8.0[L]      28 Jan 2025 05:30  Phos  2.7     01-28  Mg     1.9     01-28    TPro  5.9[L]  /  Alb  3.7  /  TBili  0.2  /  DBili  x   /  AST  18  /  ALT  21  /  AlkPhos  106  01-28      Urinalysis Basic - ( 28 Jan 2025 05:30 )    Color: x / Appearance: x / SG: x / pH: x  Gluc: 109 mg/dL / Ketone: x  / Bili: x / Urobili: x   Blood: x / Protein: x / Nitrite: x   Leuk Esterase: x / RBC: x / WBC x   Sq Epi: x / Non Sq Epi: x / Bacteria: x      CAPILLARY BLOOD GLUCOSE      POCT Blood Glucose.: 168 mg/dL (28 Jan 2025 21:54)      RADIOLOGY & ADDITIONAL TESTS: Reviewed.

## 2025-01-29 NOTE — PROGRESS NOTE ADULT - REASON FOR ADMISSION
diabetic foot ulcer

## 2025-01-29 NOTE — PROGRESS NOTE ADULT - PROVIDER SPECIALTY LIST ADULT
Podiatry
Podiatry
Infectious Disease
Podiatry
Podiatry
Infectious Disease
Internal Medicine

## 2025-01-29 NOTE — PROGRESS NOTE ADULT - PROBLEM SELECTOR PLAN 5
likely ISO GI losses from chronic diarrhea s/p duodenal switch   at one point patient was using pancrelipase however is no longer using

## 2025-01-30 LAB
CULTURE RESULTS: ABNORMAL
CULTURE RESULTS: SIGNIFICANT CHANGE UP
CULTURE RESULTS: SIGNIFICANT CHANGE UP
ORGANISM # SPEC MICROSCOPIC CNT: ABNORMAL
ORGANISM # SPEC MICROSCOPIC CNT: SIGNIFICANT CHANGE UP
SPECIMEN SOURCE: SIGNIFICANT CHANGE UP

## 2025-02-06 ENCOUNTER — EMERGENCY (EMERGENCY)
Facility: HOSPITAL | Age: 40
LOS: 1 days | Discharge: HOME | End: 2025-02-06
Attending: EMERGENCY MEDICINE | Admitting: EMERGENCY MEDICINE

## 2025-02-06 VITALS
SYSTOLIC BLOOD PRESSURE: 116 MMHG | HEART RATE: 84 BPM | RESPIRATION RATE: 17 BRPM | DIASTOLIC BLOOD PRESSURE: 71 MMHG | OXYGEN SATURATION: 99 % | TEMPERATURE: 98 F

## 2025-02-06 VITALS
TEMPERATURE: 97 F | HEART RATE: 73 BPM | RESPIRATION RATE: 16 BRPM | OXYGEN SATURATION: 99 % | SYSTOLIC BLOOD PRESSURE: 124 MMHG | HEIGHT: 68 IN | WEIGHT: 179.9 LBS | DIASTOLIC BLOOD PRESSURE: 80 MMHG

## 2025-02-06 DIAGNOSIS — D64.9 ANEMIA, UNSPECIFIED: ICD-10-CM

## 2025-02-06 DIAGNOSIS — I10 ESSENTIAL (PRIMARY) HYPERTENSION: ICD-10-CM

## 2025-02-06 DIAGNOSIS — E66.01 MORBID (SEVERE) OBESITY DUE TO EXCESS CALORIES: ICD-10-CM

## 2025-02-06 DIAGNOSIS — K52.9 NONINFECTIVE GASTROENTERITIS AND COLITIS, UNSPECIFIED: ICD-10-CM

## 2025-02-06 DIAGNOSIS — F12.90 CANNABIS USE, UNSPECIFIED, UNCOMPLICATED: ICD-10-CM

## 2025-02-06 DIAGNOSIS — Z90.3 ACQUIRED ABSENCE OF STOMACH [PART OF]: ICD-10-CM

## 2025-02-06 DIAGNOSIS — Z98.890 OTHER SPECIFIED POSTPROCEDURAL STATES: Chronic | ICD-10-CM

## 2025-02-06 DIAGNOSIS — R51.9 HEADACHE, UNSPECIFIED: ICD-10-CM

## 2025-02-06 DIAGNOSIS — Z90.49 ACQUIRED ABSENCE OF OTHER SPECIFIED PARTS OF DIGESTIVE TRACT: Chronic | ICD-10-CM

## 2025-02-06 DIAGNOSIS — J45.909 UNSPECIFIED ASTHMA, UNCOMPLICATED: ICD-10-CM

## 2025-02-06 DIAGNOSIS — E87.20 ACIDOSIS, UNSPECIFIED: ICD-10-CM

## 2025-02-06 PROCEDURE — 99283 EMERGENCY DEPT VISIT LOW MDM: CPT

## 2025-02-06 PROCEDURE — 99282 EMERGENCY DEPT VISIT SF MDM: CPT

## 2025-02-06 NOTE — ED PROVIDER NOTE - NSFOLLOWUPCLINICS_GEN_ALL_ED_FT
[facility]  Otolaryngology (ENT)  [address], [address]  [place] [address]  Phone: [phone]  Fax:     [facility] (ENT)  [address] [place] [address]  Phone: [phone]  Fax:

## 2025-02-06 NOTE — ED ADULT NURSE NOTE - OBJECTIVE STATEMENT
Pt is a 40y/o F w/ PMHx asthma, DM, currently being tx for osteomyelitis, has L-sided PICC line in place, presenting to the ED w/ c/o of plastic septum ring stuck in R-nostril since Monday. Pt was seen in ED in [place] where "they did an US and a CT but couldn't see anything because its plastic, they said I might need a MRI." Upon assessment, ring is unable to be visualized. Pt endorses slight diff breathing, pain @ tip of nose. Denies bleeding, drainage, fevers/chills. Pt A/Ox3, speaking in clear/complete sentences. Respirations easy/even and unlabored on RA. Pt ambulates independently w/ steady gait. Pt resting comfortably in chair, no acute distress. Pending ED provider eval.

## 2025-02-06 NOTE — ED PROVIDER NOTE - PATIENT PORTAL LINK FT
You can access the FollowMyHealth Patient Portal offered by [facility] by registering at the following website: [email or web address]. By joining [facility]’s FollowMyHealth portal, you will also be able to view your health information using other applications (apps) compatible with our system.

## 2025-02-10 ENCOUNTER — APPOINTMENT (OUTPATIENT)
Dept: OTOLARYNGOLOGY | Facility: CLINIC | Age: 40
End: 2025-02-10

## 2025-03-07 ENCOUNTER — APPOINTMENT (OUTPATIENT)
Facility: CLINIC | Age: 40
End: 2025-03-07
Payer: MEDICARE

## 2025-03-07 VITALS
SYSTOLIC BLOOD PRESSURE: 128 MMHG | BODY MASS INDEX: 26.98 KG/M2 | TEMPERATURE: 98.4 F | DIASTOLIC BLOOD PRESSURE: 76 MMHG | HEART RATE: 76 BPM | WEIGHT: 178 LBS | HEIGHT: 68 IN | OXYGEN SATURATION: 98 %

## 2025-03-07 PROCEDURE — 99215 OFFICE O/P EST HI 40 MIN: CPT
